# Patient Record
Sex: FEMALE | Race: WHITE | NOT HISPANIC OR LATINO | Employment: OTHER | URBAN - METROPOLITAN AREA
[De-identification: names, ages, dates, MRNs, and addresses within clinical notes are randomized per-mention and may not be internally consistent; named-entity substitution may affect disease eponyms.]

---

## 2017-03-02 ENCOUNTER — OFFICE VISIT (OUTPATIENT)
Dept: INTERNAL MEDICINE | Facility: CLINIC | Age: 62
End: 2017-03-02
Payer: MEDICARE

## 2017-03-02 VITALS
HEART RATE: 77 BPM | OXYGEN SATURATION: 98 % | BODY MASS INDEX: 20.44 KG/M2 | RESPIRATION RATE: 18 BRPM | WEIGHT: 108.25 LBS | DIASTOLIC BLOOD PRESSURE: 68 MMHG | HEIGHT: 61 IN | SYSTOLIC BLOOD PRESSURE: 122 MMHG

## 2017-03-02 DIAGNOSIS — R63.4 WEIGHT LOSS, NON-INTENTIONAL: ICD-10-CM

## 2017-03-02 DIAGNOSIS — E78.5 HYPERLIPIDEMIA WITH TARGET LDL LESS THAN 160: ICD-10-CM

## 2017-03-02 DIAGNOSIS — N64.4 MASTALGIA IN FEMALE: Primary | ICD-10-CM

## 2017-03-02 DIAGNOSIS — M89.69 POLIOMYELITIS OSTEOPATHY OF MULTIPLE SITES: ICD-10-CM

## 2017-03-02 DIAGNOSIS — B91 POLIOMYELITIS OSTEOPATHY OF MULTIPLE SITES: ICD-10-CM

## 2017-03-02 PROCEDURE — 99214 OFFICE O/P EST MOD 30 MIN: CPT | Mod: S$GLB,,, | Performed by: INTERNAL MEDICINE

## 2017-03-02 PROCEDURE — 99499 UNLISTED E&M SERVICE: CPT | Mod: S$GLB,,, | Performed by: INTERNAL MEDICINE

## 2017-03-02 PROCEDURE — 1160F RVW MEDS BY RX/DR IN RCRD: CPT | Mod: S$GLB,,, | Performed by: INTERNAL MEDICINE

## 2017-03-02 NOTE — MR AVS SNAPSHOT
[facility]  [address],  [address]  [place] [address]  Phone: [phone]  Fax: [phone]                  Julia Fernández   3/2/2017 8:20 AM   Office Visit    Description:  Female : 1955   Provider:  Jamila Costello MD   Department:  [facility]           Reason for Visit     Breast Pain           Diagnoses this Visit        Comments    Mastalgia in female    -  Primary     Hyperlipidemia with target LDL less than 160         Weight loss, non-intentional         Poliomyelitis osteopathy of multiple sites                To Do List           Future Appointments        Provider Department Dept Phone    2017 8:40 AM Jamila Costello MD [facility] [phone]      Goals (5 Years of Data)     None      [facility]     [facility] -  Assistance  Registered nurses in the [facility] provide clinical advisement, health education, appointment booking, and other advisory services.  Call for this free service at [phone].             Medications           Message regarding Medications     Verify the changes and/or additions to your medication regime listed below are the same as discussed with your clinician today.  If any of these changes or additions are incorrect, please notify your healthcare provider.             Verify that the below list of medications is an accurate representation of the medications you are currently taking.  If none reported, the list may be blank. If incorrect, please contact your healthcare provider. Carry this list with you in case of emergency.           Current Medications     alprazolam (XANAX) 0.5 MG tablet Take 0.5 mg by mouth nightly as needed for Anxiety.    aspirin (ECOTRIN) 81 MG EC tablet Take 81 mg by mouth once daily.    calcium carbonate-vitamin D3 (CALTRATE 600 + D) 600 mg (1,500 mg)-800 unit Chew Take by mouth 2 (two) times daily.     docusate sodium (COLACE) 50 MG capsule Take by mouth 2 (two) times daily.    gemfibrozil (LOPID) 600 MG tablet Take 1 tablet (600 mg total) by mouth 2 (two) times daily before meals.    hydrocodone-acetaminophen 10-325mg (NORCO)  mg Tab Take by mouth 2 (two) times daily as needed.    melatonin 3 mg Tab Take 6 mg by mouth.    niacin 500 MG CpSR Take 250 mg by mouth 2 (two) times daily.    pantoprazole (PROTONIX) 40 MG tablet Take 1 tablet (40 mg total) by mouth once daily.    zoledronic acid-mannitol & water (RECLAST) 5 mg/100 mL Soln Inject 5 mg into the vein once.           Clinical Reference Information           Your Vitals Were     BP                   122/68 (BP Location: Right arm, Patient Position: Sitting, BP Method: Manual)           Blood Pressure          Most Recent Value    BP  122/68      Allergies as of 3/2/2017     Gabapentin    Prevnar 13 [Pneumoc 13-bethany Conj-dip Cr(pf)]      Immunizations Administered on Date of Encounter - 3/2/2017     None      Orders Placed During Today's Visit     Future Labs/Procedures Expected by Expires    CBC auto differential  3/2/2017 3/2/2018    Comprehensive metabolic panel  3/2/2017 3/2/2018    Lipid panel  3/2/2017 3/2/2018    TSH  3/2/2017 3/2/2018    Urinalysis  3/2/2017 3/2/2018      [facility] Sign-Up     Activating your [facility] account is as easy as 1-2-3!     1) Visit [email or web address], select Sign Up Now, enter this activation code and your date of birth, then select Next.  Activation code not generated  Current Patient Portal Status: Account disabled      2) Create a username and password to use when you visit [facility] in the future and select a security question in case you lose your password and select Next.    3) Enter your e-mail address and click Sign Up!    Additional Information  If you have questions, please e-mail [email or web address] or call [phone] to talk to our [facility] staff. Remember, [facility] is NOT to be used for urgent needs. For  medical emergencies, dial 911.         Language Assistance Services     ATTENTION: Language assistance services are available, free of charge. Please call [phone].      ATENCIÓN: Si habnyasia oliver, tiene a roy disposición servicios gratuitos de asistencia lingüística. Llame al [phone].     CHÚ Ý: N?u b?n nói Ti?ng Vi?t, có các d?ch v? h? tr? ngôn ng? mi?n phí dành cho b?n. G?i s? [phone].         [facility] complies with applicable Federal civil rights laws and does not discriminate on the basis of race, color, national origin, age, disability, or sex.

## 2017-03-02 NOTE — PROGRESS NOTES
"Subjective:      Patient ID: Julia Fernández is a 61 y.o. female.    Chief Complaint: Breast Pain (both breast)    HPI: 61y/oWF, has had intermittent pains in both breasts, that seem to be tender to the touch.  This is not constant, not daily, nor lasts long.  Nothing mitigates this discomfort.  She has not found any masses,or had any discharge from nipples.    Review of Systems   HENT: Negative.    Respiratory: Negative.    Cardiovascular: Negative.    Gastrointestinal: Negative.    Endocrine: Negative.    Genitourinary: Negative.    Musculoskeletal: Positive for back pain, gait problem and neck pain.        Post-polio yndrome.  Sees Dr. Torre for neck pain and gets" shots in it".   Neurological: Positive for weakness.   Psychiatric/Behavioral: Negative.        Objective:   /68 (BP Location: Right arm, Patient Position: Sitting, BP Method: Manual)  Pulse 77  Resp 18  Ht 5' 1" (1.549 m)  Wt 49.1 kg (108 lb 3.9 oz)  SpO2 98%  BMI 20.45 kg/m2    Physical Exam   Constitutional: She is oriented to person, place, and time. She appears well-developed.   HENT:   Head: Atraumatic.   Mouth/Throat: Oropharynx is clear and moist.   Eyes: Conjunctivae are normal. Pupils are equal, round, and reactive to light.   Neck: Normal range of motion.   Cardiovascular: Normal rate, regular rhythm and normal heart sounds.    Pulmonary/Chest: Effort normal and breath sounds normal. She exhibits no mass, no tenderness, no crepitus, no edema, no deformity and no retraction. Right breast exhibits no inverted nipple, no mass, no nipple discharge, no skin change and no tenderness. Left breast exhibits no inverted nipple, no mass, no nipple discharge, no skin change and no tenderness. Breasts are symmetrical. There is no breast swelling.   Abdominal: Soft. Bowel sounds are normal.   Genitourinary: No breast tenderness, discharge or bleeding.   Musculoskeletal: She exhibits no tenderness.   Neurological: She is alert and " oriented to person, place, and time.   Skin: Skin is warm and dry.   Psychiatric: She has a normal mood and affect. Her behavior is normal.   Nursing note and vitals reviewed.      Assessment:     1. Mastalgia in female    2. Hyperlipidemia with target LDL less than 160    3. Weight loss, non-intentional    4. Poliomyelitis osteopathy of multiple sites    Has a negative mammogram,CXR.  Plan:     Mastalgia in female  Wonder if this could be referred from neck.  Hyperlipidemia with target LDL less than 160  -     Lipid panel; Future; Expected date: 3/2/17    Weight loss, non-intentional  -     CBC auto differential; Future; Expected date: 3/2/17  -     Comprehensive metabolic panel; Future; Expected date: 3/2/17  -     TSH; Future; Expected date: 3/2/17  -     Urinalysis; Future; Expected date: 3/2/17    Poliomyelitis osteopathy of multiple sites

## 2017-04-10 ENCOUNTER — TELEPHONE (OUTPATIENT)
Dept: INFUSION THERAPY | Facility: HOSPITAL | Age: 62
End: 2017-04-10

## 2017-04-10 RX ORDER — ZOLEDRONIC ACID 5 MG/100ML
5 INJECTION, SOLUTION INTRAVENOUS
Status: CANCELLED | OUTPATIENT
Start: 2017-04-10

## 2017-04-10 RX ORDER — SODIUM CHLORIDE 0.9 % (FLUSH) 0.9 %
10 SYRINGE (ML) INJECTION
Status: CANCELLED | OUTPATIENT
Start: 2017-04-10

## 2017-04-20 ENCOUNTER — INFUSION (OUTPATIENT)
Dept: INFUSION THERAPY | Facility: HOSPITAL | Age: 62
End: 2017-04-20
Attending: INTERNAL MEDICINE
Payer: MEDICARE

## 2017-04-20 VITALS
SYSTOLIC BLOOD PRESSURE: 109 MMHG | HEART RATE: 80 BPM | DIASTOLIC BLOOD PRESSURE: 68 MMHG | TEMPERATURE: 99 F | RESPIRATION RATE: 18 BRPM

## 2017-04-20 DIAGNOSIS — M81.0 SENILE OSTEOPOROSIS: Primary | ICD-10-CM

## 2017-04-20 PROCEDURE — 25000003 PHARM REV CODE 250: Performed by: INTERNAL MEDICINE

## 2017-04-20 PROCEDURE — 63600175 PHARM REV CODE 636 W HCPCS: Performed by: INTERNAL MEDICINE

## 2017-04-20 PROCEDURE — 96365 THER/PROPH/DIAG IV INF INIT: CPT

## 2017-04-20 RX ORDER — ZOLEDRONIC ACID 5 MG/100ML
5 INJECTION, SOLUTION INTRAVENOUS
Status: COMPLETED | OUTPATIENT
Start: 2017-04-20 | End: 2017-04-20

## 2017-04-20 RX ORDER — ZOLEDRONIC ACID 5 MG/100ML
5 INJECTION, SOLUTION INTRAVENOUS
Status: CANCELLED | OUTPATIENT
Start: 2017-04-20

## 2017-04-20 RX ORDER — SODIUM CHLORIDE 0.9 % (FLUSH) 0.9 %
10 SYRINGE (ML) INJECTION
Status: DISCONTINUED | OUTPATIENT
Start: 2017-04-20 | End: 2017-04-20 | Stop reason: HOSPADM

## 2017-04-20 RX ORDER — SODIUM CHLORIDE 0.9 % (FLUSH) 0.9 %
10 SYRINGE (ML) INJECTION
Status: CANCELLED | OUTPATIENT
Start: 2017-04-20

## 2017-04-20 RX ADMIN — ZOLEDRONIC ACID 5 MG: 5 INJECTION, SOLUTION INTRAVENOUS at 12:04

## 2017-04-20 RX ADMIN — SODIUM CHLORIDE: 0.9 INJECTION, SOLUTION INTRAVENOUS at 12:04

## 2017-04-20 NOTE — NURSING
Pt tolerated infusion well. No adverse reaction noted. Pt education reinforced on Reclast , side effects, what to expect, and when to call _[facility]_. Pt verbalized understanding. I reviewed pt calendar w/ pt and understanding verbalized. IV flushed w/ NS and D/C per protocol. Pt has received Reclast before with no adverse reactions. Continue taking Ca and Vit D as prescribed and increase PO water over next 24-48 hours. Verbalized understanding.

## 2017-04-21 ENCOUNTER — OFFICE VISIT (OUTPATIENT)
Dept: INTERNAL MEDICINE | Facility: CLINIC | Age: 62
End: 2017-04-21
Payer: MEDICARE

## 2017-04-21 VITALS
TEMPERATURE: 98 F | SYSTOLIC BLOOD PRESSURE: 112 MMHG | BODY MASS INDEX: 20.44 KG/M2 | HEART RATE: 70 BPM | OXYGEN SATURATION: 98 % | WEIGHT: 108.25 LBS | DIASTOLIC BLOOD PRESSURE: 60 MMHG | HEIGHT: 61 IN | RESPIRATION RATE: 16 BRPM

## 2017-04-21 DIAGNOSIS — M81.0 SENILE OSTEOPOROSIS: Primary | ICD-10-CM

## 2017-04-21 DIAGNOSIS — M89.69 POLIOMYELITIS OSTEOPATHY OF MULTIPLE SITES: ICD-10-CM

## 2017-04-21 DIAGNOSIS — K21.9 GASTROESOPHAGEAL REFLUX DISEASE WITHOUT ESOPHAGITIS: ICD-10-CM

## 2017-04-21 DIAGNOSIS — B91 POLIOMYELITIS OSTEOPATHY OF MULTIPLE SITES: ICD-10-CM

## 2017-04-21 DIAGNOSIS — E78.5 HYPERLIPIDEMIA WITH TARGET LDL LESS THAN 160: ICD-10-CM

## 2017-04-21 PROCEDURE — 99214 OFFICE O/P EST MOD 30 MIN: CPT | Mod: S$GLB,,, | Performed by: INTERNAL MEDICINE

## 2017-04-21 PROCEDURE — 1160F RVW MEDS BY RX/DR IN RCRD: CPT | Mod: S$GLB,,, | Performed by: INTERNAL MEDICINE

## 2017-04-21 NOTE — MR AVS SNAPSHOT
[facility]  [address],  [address]  [place] [address]  Phone: [phone]  Fax: [phone]                  Julia Fernández   2017 9:20 AM   Office Visit    Description:  Female : 1955   Provider:  Jamila Costello MD   Department:  [facility]           Reason for Visit     Results           Diagnoses this Visit        Comments    Senile osteoporosis    -  Primary     Poliomyelitis osteopathy of multiple sites         Gastroesophageal reflux disease without esophagitis         Hyperlipidemia with target LDL less than 160                To Do List           Future Appointments        Provider Department Dept Phone    2017 8:20 AM Jamila Costello MD [facility] [phone]      Goals (5 Years of Data)     None      [facility]     [facility] Care Line -  Assistance  Unless otherwise directed by your provider, please contact [facility], our nurse care line that is available for  assistance.     Registered nurses in the [facility] Center provide: appointment scheduling, clinical advisement, health education, and other advisory services.  Call: [phone] (toll free)               Medications           Message regarding Medications     Verify the changes and/or additions to your medication regime listed below are the same as discussed with your clinician today.  If any of these changes or additions are incorrect, please notify your healthcare provider.             Verify that the below list of medications is an accurate representation of the medications you are currently taking.  If none reported, the list may be blank. If incorrect, please contact your healthcare provider. Carry this list with you in case of emergency.           Current Medications     alprazolam (XANAX) 0.5 MG tablet Take 0.5 mg by mouth nightly as needed for Anxiety.    aspirin (ECOTRIN) 81 MG EC tablet  "Take 81 mg by mouth once daily.    calcium carbonate-vitamin D3 (CALTRATE 600 + D) 600 mg (1,500 mg)-800 unit Chew Take by mouth 2 (two) times daily.    docusate sodium (COLACE) 50 MG capsule Take by mouth 2 (two) times daily.    gemfibrozil (LOPID) 600 MG tablet Take 1 tablet (600 mg total) by mouth 2 (two) times daily before meals.    hydrocodone-acetaminophen 10-325mg (NORCO)  mg Tab Take by mouth 2 (two) times daily as needed.    melatonin 3 mg Tab Take 6 mg by mouth.    niacin 500 MG CpSR Take 250 mg by mouth 2 (two) times daily.    pantoprazole (PROTONIX) 40 MG tablet Take 1 tablet (40 mg total) by mouth once daily.    zoledronic acid-mannitol & water (RECLAST) 5 mg/100 mL Soln Inject 5 mg into the vein once.           Clinical Reference Information           Your Vitals Were     BP Pulse Temp Resp Height Weight    112/60 (BP Location: Right arm, Patient Position: Sitting, BP Method: Manual) 70 97.9 °F (36.6 °C) (Oral) 16 5' 1" (1.549 m) 49.1 kg (108 lb 3.9 oz)    SpO2 BMI             98% 20.45 kg/m2         Blood Pressure          Most Recent Value    BP  112/60      Allergies as of 4/21/2017     Gabapentin    Prevnar 13 [Pneumoc 13-bethany Conj-dip Cr(pf)]      Immunizations Administered on Date of Encounter - 4/21/2017     None      [facility] Sign-Up     Activating your [facility] account is as easy as 1-2-3!     1) Visit [email or web address], select Sign Up Now, enter this activation code and your date of birth, then select Next.  Activation code not generated  Current Patient Portal Status: Account disabled      2) Create a username and password to use when you visit [facility] in the future and select a security question in case you lose your password and select Next.    3) Enter your e-mail address and click Sign Up!    Additional Information  If you have questions, please e-mail [email or web address] or call [phone] to talk to our [facility] staff. Remember, [facility] is NOT to be used for urgent needs. " For medical emergencies, dial 911.         Language Assistance Services     ATTENTION: Language assistance services are available, free of charge. Please call [phone].      ATENCIÓN: Si habnyasia oliver, tiene a roy disposición servicios gratuitos de asistencia lingüística. Llame al [phone].     CHÚ Ý: N?u b?n nói Ti?ng Vi?t, có các d?ch v? h? tr? ngôn ng? mi?n phí dành cho b?n. G?i s? [phone].         [facility] complies with applicable Federal civil rights laws and does not discriminate on the basis of race, color, national origin, age, disability, or sex.

## 2017-04-21 NOTE — PROGRESS NOTES
Subjective:      Patient ID: Julia Fernández is a 62 y.o. female.    Chief Complaint: Results (pt in for lab results)    HPI: 62y/oWF, recently had her reclast infusion, done at [facility].  She also recently saw Dr. Torre for pain, who also thinks the pain in her left anterior chest  Is coming from her neck. He gave her Lyrica, but this made her woozy.  The pain has abated somewhat.  Reviewed diet, movement, family history all pertaining to Cholesterol.  Pt does not want zostavax, despite having a history of shingles on her nose.        Reviewed labs with pt.    Date/Time Component Value Flag Lab Status   04/11/17 0829 TSH 1.360 - Final result   04/11/17 0829 HDL 59 - Final result   04/11/17 0829 CHOL 195 - Final result   04/11/17 0829 TRIG 66 - Final result   04/11/17 0829 LDLCALC 122.8 - Final result   04/11/17 0829 CHOLHDL 30.3 - Final result   04/11/17 0829 NONHDLCHOL 136 - Final result   04/11/17 0829 TOTALCHOLEST 3.3 - Final result   04/11/17 0829 COLORU Yellow - Final result   04/11/17 0829 APPEARANCEUA Clear - Final result   04/11/17 0829 SPECGRAV 1.020 - Final result   04/11/17 0829 PHUR 6.0 - Final result   04/11/17 0829 KETONESU Negative - Final result   04/11/17 0829 OCCULTUA Negative - Final result   04/11/17 0829 NITRITE Negative - Final result   04/11/17 0829 UROBILINOGEN Negative - Final result   05/27/16 1555 INR 1.1 - Final result   04/11/17 0829 LEUKOCYTESUR Negative - Final result   04/11/17 0829 WBC 3.85 Low Final result   04/11/17 0829 RBC 4.41 - Final result   04/11/17 0829 HGB 12.8 - Final result   04/11/17 0829 HCT 39.6 - Final result   04/11/17 0829 MCH 29.0 - Final result   04/11/17 0829 RDW 12.1 - Final result   04/11/17 0829  - Final result   04/11/17 0829 MPV 11.4 - Final result   04/11/17 0829 GLU 88 - Final result   04/11/17 0829 BUN 15 - Final result   04/11/17 0829 CREATININE 0.50 - Final result   04/11/17 0829 CALCIUM 9.9 - Final result   04/11/17 0829  - Final  "result   04/11/17 0829 K 4.7 - Final result   04/11/17 0829  - Final result   04/11/17 0829 PROT 7.6 - Final result   04/11/17 0829 ALBUMIN 4.5 - Final result   04/11/17 0829 BILITOT 0.6 - Final result   04/11/17 0829 AST 27 - Final result   04/11/17 0829 ALKPHOS 64 - Final result   04/11/17 0829 CO2 31 High Final result   04/11/17 0829 ALT 25 - Final result   04/11/17 0829 ANIONGAP 8 - Final result   04/11/17 0829 EGFRNONAA >60.0 - Final result   04/11/17 0829 ESTGFRAFRICA >60.0 - Final result   04/11/17 0829 MCV 90 - Final result       Review of Systems   Constitutional: Negative.    HENT: Negative.    Eyes: Negative.    Respiratory: Negative.    Cardiovascular: Negative.    Gastrointestinal: Negative.    Endocrine: Negative.    Genitourinary: Negative.    Musculoskeletal: Negative.    Skin: Negative.    Allergic/Immunologic: Negative.    Neurological: Negative.    Hematological: Negative.    Psychiatric/Behavioral: Negative.        Objective:   /60 (BP Location: Right arm, Patient Position: Sitting, BP Method: Manual)  Pulse 70  Temp 97.9 °F (36.6 °C) (Oral)   Resp 16  Ht 5' 1" (1.549 m)  Wt 49.1 kg (108 lb 3.9 oz)  SpO2 98%  BMI 20.45 kg/m2    Physical Exam   Constitutional: She is oriented to person, place, and time. She appears well-developed and well-nourished.   HENT:   Head: Normocephalic and atraumatic.   Right Ear: External ear normal.   Left Ear: External ear normal.   Nose: Nose normal.   Mouth/Throat: Oropharynx is clear and moist.   Eyes: Conjunctivae and EOM are normal. Pupils are equal, round, and reactive to light.   Neck: Normal range of motion. Neck supple.   Cardiovascular: Normal rate, regular rhythm and normal heart sounds.    Pulmonary/Chest: Effort normal and breath sounds normal.   Abdominal: Soft. Bowel sounds are normal.   Musculoskeletal: She exhibits no edema or tenderness.   Brace RLE  Walks with a cane   Neurological: She is alert and oriented to person, place, and " time.   Skin: Skin is warm and dry.   Psychiatric: She has a normal mood and affect. Her behavior is normal.   Nursing note and vitals reviewed.      Assessment:     1. Senile osteoporosis    2. Poliomyelitis osteopathy of multiple sites    3. Gastroesophageal reflux disease without esophagitis    4. Hyperlipidemia with target LDL less than 160      Plan:     Senile osteoporosis    Poliomyelitis osteopathy of multiple sites  -     CBC auto differential; Future; Expected date: 8/19/17  -     Comprehensive metabolic panel; Future; Expected date: 8/19/17    Gastroesophageal reflux disease without esophagitis    Hyperlipidemia with target LDL less than 160  -     Lipid panel; Future; Expected date: 8/19/17

## 2017-06-16 ENCOUNTER — TELEPHONE (OUTPATIENT)
Dept: INTERNAL MEDICINE | Facility: CLINIC | Age: 62
End: 2017-06-16

## 2017-06-16 NOTE — TELEPHONE ENCOUNTER
"----- Message from Augustus York sent at 6/16/2017 12:59 PM CDT -----  Contact: self  [phone]  "My md ask if I could get off the lopid for 2wk, because of muscle pain in my leg.    When was the last time I had blood work done?"    Pt last labs April 11 2017.vf/ma  "

## 2017-08-18 ENCOUNTER — OFFICE VISIT (OUTPATIENT)
Dept: INTERNAL MEDICINE | Facility: CLINIC | Age: 62
End: 2017-08-18
Payer: MEDICARE

## 2017-08-18 VITALS
BODY MASS INDEX: 21 KG/M2 | TEMPERATURE: 99 F | RESPIRATION RATE: 16 BRPM | HEART RATE: 68 BPM | HEIGHT: 61 IN | OXYGEN SATURATION: 97 % | WEIGHT: 111.25 LBS | SYSTOLIC BLOOD PRESSURE: 120 MMHG | DIASTOLIC BLOOD PRESSURE: 70 MMHG

## 2017-08-18 DIAGNOSIS — B91 POLIOMYELITIS OSTEOPATHY OF MULTIPLE SITES: Primary | ICD-10-CM

## 2017-08-18 DIAGNOSIS — M89.69 POLIOMYELITIS OSTEOPATHY OF MULTIPLE SITES: Primary | ICD-10-CM

## 2017-08-18 DIAGNOSIS — I34.1 MITRAL VALVE PROLAPSE: ICD-10-CM

## 2017-08-18 DIAGNOSIS — E78.5 HYPERLIPIDEMIA WITH TARGET LDL LESS THAN 160: ICD-10-CM

## 2017-08-18 PROCEDURE — 3008F BODY MASS INDEX DOCD: CPT | Mod: S$GLB,,, | Performed by: INTERNAL MEDICINE

## 2017-08-18 PROCEDURE — 99214 OFFICE O/P EST MOD 30 MIN: CPT | Mod: S$GLB,,, | Performed by: INTERNAL MEDICINE

## 2017-08-18 PROCEDURE — 99499 UNLISTED E&M SERVICE: CPT | Mod: S$GLB,,, | Performed by: INTERNAL MEDICINE

## 2017-08-18 RX ORDER — BACLOFEN 10 MG/1
10 TABLET ORAL 2 TIMES DAILY
Qty: 60 TABLET | Refills: 11 | Status: SHIPPED | OUTPATIENT
Start: 2017-08-18 | End: 2017-12-15 | Stop reason: DRUGHIGH

## 2017-08-18 NOTE — PROGRESS NOTES
"Subjective:      Patient ID: Julia Fernández is a 62 y.o. female.    Chief Complaint: Pain (gernerlized pain for afew months)    HPI: 62 y.o. White female  Sees Dr. Baird, for chronic pain management.  She has intermitent spasms of pectoral muscles,low back, thighs.  She has tried both Neurontin and Lyrica, but both make her dizzy and confused.  She has had a CT neck ( negative), mammograms negative.      Review of Systems   HENT: Negative.    Eyes: Negative.    Respiratory: Negative.    Cardiovascular: Negative.    Gastrointestinal: Negative.    Genitourinary: Negative.    Musculoskeletal:        See chief complaint   Skin: Negative.    Neurological: Weakness: right leg.   Psychiatric/Behavioral: Negative.        Objective:   /70 (BP Location: Left arm, Patient Position: Sitting, BP Method: Large (Manual))   Pulse 68   Temp 98.5 °F (36.9 °C) (Oral)   Resp 16   Ht 5' 1" (1.549 m)   Wt 50.4 kg (111 lb 3.6 oz)   SpO2 97%   BMI 21.02 kg/m²     Physical Exam   Constitutional: She is oriented to person, place, and time. She appears well-developed and well-nourished.   HENT:   Head: Normocephalic and atraumatic.   Right Ear: External ear normal.   Left Ear: External ear normal.   Nose: Nose normal.   Mouth/Throat: Oropharynx is clear and moist.   Eyes: Conjunctivae and EOM are normal. Pupils are equal, round, and reactive to light.   Neck: Normal range of motion.   Cardiovascular: Normal rate, regular rhythm and normal heart sounds.    Pulmonary/Chest: Effort normal and breath sounds normal.   Abdominal: Soft. Bowel sounds are normal.   Musculoskeletal: She exhibits deformity.   Right leg foreshortened, atrophied from polio, wears a brace, uses a cane.  Waddle gait.  Have exam,ined breasts,axilla,chest,back,thighs... No spasms.   Neurological: She is alert and oriented to person, place, and time.   Skin: Skin is warm and dry.   Psychiatric: She has a normal mood and affect. Her behavior is normal. "   Nursing note and vitals reviewed.      Assessment:     1. Poliomyelitis osteopathy of multiple sites    2. Hyperlipidemia with target LDL less than 160    3. Mitral valve prolapse    This is probably a post-polio syndrome.  Plan:     Poliomyelitis osteopathy of multiple sites  -     CBC auto differential; Future; Expected date: 08/18/2017  -     Comprehensive metabolic panel; Future; Expected date: 08/18/2017  -     Urinalysis; Future; Expected date: 08/18/2017  -     baclofen (LIORESAL) 10 MG tablet; Take 1 tablet (10 mg total) by mouth 2 (two) times daily.  Dispense: 60 tablet; Refill: 11    Hyperlipidemia with target LDL less than 160  -     Lipid panel; Future; Expected date: 08/18/2017    Mitral valve prolapse  -     CBC auto differential; Future; Expected date: 08/18/2017  -     Comprehensive metabolic panel; Future; Expected date: 08/18/2017

## 2017-12-15 ENCOUNTER — OFFICE VISIT (OUTPATIENT)
Dept: INTERNAL MEDICINE | Facility: CLINIC | Age: 62
End: 2017-12-15
Payer: MEDICARE

## 2017-12-15 VITALS
SYSTOLIC BLOOD PRESSURE: 120 MMHG | TEMPERATURE: 98 F | OXYGEN SATURATION: 97 % | WEIGHT: 114.5 LBS | RESPIRATION RATE: 16 BRPM | HEART RATE: 100 BPM | BODY MASS INDEX: 21.62 KG/M2 | HEIGHT: 61 IN | DIASTOLIC BLOOD PRESSURE: 60 MMHG

## 2017-12-15 DIAGNOSIS — G14 POST-POLIO SYNDROME: Primary | ICD-10-CM

## 2017-12-15 DIAGNOSIS — K21.9 GASTROESOPHAGEAL REFLUX DISEASE WITHOUT ESOPHAGITIS: ICD-10-CM

## 2017-12-15 DIAGNOSIS — M89.69 POLIOMYELITIS OSTEOPATHY OF MULTIPLE SITES: ICD-10-CM

## 2017-12-15 DIAGNOSIS — B91 POLIOMYELITIS OSTEOPATHY OF MULTIPLE SITES: ICD-10-CM

## 2017-12-15 DIAGNOSIS — E78.5 HYPERLIPIDEMIA WITH TARGET LDL LESS THAN 160: ICD-10-CM

## 2017-12-15 PROCEDURE — 99214 OFFICE O/P EST MOD 30 MIN: CPT | Mod: S$GLB,,, | Performed by: INTERNAL MEDICINE

## 2017-12-15 PROCEDURE — 99499 UNLISTED E&M SERVICE: CPT | Mod: S$GLB,,, | Performed by: INTERNAL MEDICINE

## 2017-12-15 PROCEDURE — 99999 PR PBB SHADOW E&M-EST. PATIENT-LVL III: CPT | Mod: PBBFAC,,, | Performed by: INTERNAL MEDICINE

## 2017-12-15 RX ORDER — ROSUVASTATIN CALCIUM 10 MG/1
10 TABLET, COATED ORAL DAILY
Qty: 90 TABLET | Refills: 3 | Status: SHIPPED | OUTPATIENT
Start: 2017-12-15 | End: 2018-10-02 | Stop reason: SDUPTHER

## 2017-12-15 RX ORDER — PANTOPRAZOLE SODIUM 40 MG/1
40 TABLET, DELAYED RELEASE ORAL DAILY
Qty: 90 TABLET | Refills: 3 | Status: SHIPPED | OUTPATIENT
Start: 2017-12-15 | End: 2018-02-08

## 2017-12-15 RX ORDER — BACLOFEN 20 MG/1
20 TABLET ORAL 3 TIMES DAILY
Qty: 90 TABLET | Refills: 11 | Status: SHIPPED | OUTPATIENT
Start: 2017-12-15 | End: 2018-10-24 | Stop reason: SDUPTHER

## 2017-12-15 NOTE — PROGRESS NOTES
Subjective:      Patient ID: Julia Fernández is a 62 y.o. female.    Chief Complaint: Follow-up; Results; Medication Refill; and Generalized Body Aches (pain)    HPI: 62 y.o. White female , with right sided residual effects of polio, has had bilateral sternocleidomast.  Muscle pain/aciness/spasmotic issues for months. The Baclofen at 10mg BID did not seem to help, but was not sedating either.    She has had residual muscle atrophy  From Polio on the right.  Reviewed labs with pt:  0820 HDL 46 - Final result   12/01/17 0820 CHOL 219 High Final result   12/01/17 0820 TRIG 84 - Final result   12/01/17 0820 LDLCALC 156.2 - Final result   12/01/17 0820 CHOLHDL 21.0 - Final result   12/01/17 0820 NONHDLCHOL 173 - Final result   12/01/17 0820 TOTALCHOLEST 4.8 - Final result   12/01/17 0820 COLORU Yellow - Final result   12/01/17 0820 APPEARANCEUA Clear - Final result   12/01/17 0820 SPECGRAV 1.025 - Final result   12/01/17 0820 PHUR 5.0 - Final result   12/01/17 0820 KETONESU Negative - Final result   12/01/17 0820 OCCULTUA Negative - Final result   12/01/17 0820 NITRITE Negative - Final result   12/01/17 0820 UROBILINOGEN Negative - Final      12/01/17 0820 LEUKOCYTESUR Negative - Final result   12/01/17 0820 WBC 3.53 Low Final result   12/01/17 0820 RBC 4.16 - Final result   12/01/17 0820 HGB 12.3 - Final result   12/01/17 0820 HCT 37.2 - Final result   12/01/17 0820 MCH 29.6 - Final result   12/01/17 0820 RDW 11.7 - Final result   12/01/17 0820  - Final result   12/01/17 0820 MPV 10.5 - Final result   12/01/17 0820 GLU 95 - Final result   12/01/17 0820 BUN 16 - Final result   12/01/17 0820 CREATININE 0.51 - Final result   12/01/17 0820 CALCIUM 9.8 - Final result   12/01/17 0820  - Final result   12/01/17 0820 K 4.2 - Final result   12/01/17 0820  - Final result   12/01/17 0820 PROT 7.6 - Final result   12/01/17 0820 ALBUMIN 4.4 - Final result   12/01/17 0820 BILITOT 0.5 - Final result   12/01/17  "0820 AST 24 - Final result   12/01/17 0820 ALKPHOS 46 - Final result   12/01/17 0820 CO2 26 - Final result   12/01/17 0820 ALT 25 - Final result   12/01/17 0820 ANIONGAP 9 - Final result   12/01/17 0820 EGFRNONAA >60.0 - Final result   12/01/17 0820 ESTGFRAFRICA >60.0 - Final result   12/01/17 0820             Review of Systems   Constitutional: Positive for activity change. Negative for unexpected weight change.   HENT: Negative.    Respiratory: Negative for cough, chest tightness and shortness of breath.    Cardiovascular: Negative for chest pain and palpitations.   Gastrointestinal: Negative.    Endocrine: Negative.    Genitourinary: Negative.    Musculoskeletal: Positive for gait problem, myalgias, neck pain and neck stiffness.   Skin: Negative.    Allergic/Immunologic: Negative.    Psychiatric/Behavioral: Negative.        Objective:   /60 (BP Location: Left arm, Patient Position: Sitting, BP Method: Medium (Manual))   Pulse 100   Temp 98 °F (36.7 °C) (Oral)   Resp 16   Ht 5' 1" (1.549 m)   Wt 51.9 kg (114 lb 8.5 oz)   SpO2 97%   BMI 21.64 kg/m²     Physical Exam   Constitutional: She is oriented to person, place, and time. She appears well-developed and well-nourished.   HENT:   Head: Normocephalic and atraumatic.   Right Ear: External ear normal.   Left Ear: External ear normal.   Nose: Nose normal.   Mouth/Throat: Oropharynx is clear and moist.   Eyes: Conjunctivae and EOM are normal. Pupils are equal, round, and reactive to light.   Neck: Neck supple. No hepatojugular reflux present. Muscular tenderness present. Carotid bruit is not present. Decreased range of motion present.       Tender,spasms   Cardiovascular: Normal rate, regular rhythm and normal heart sounds.    Pulmonary/Chest: Effort normal and breath sounds normal.   Abdominal: Soft. Bowel sounds are normal.   Neurological: She is alert and oriented to person, place, and time.   Skin: Skin is warm and dry.   Psychiatric: She has a " normal mood and affect. Her behavior is normal.   Nursing note and vitals reviewed.      Assessment:     1. Post-polio syndrome    2. Hyperlipidemia with target LDL less than 160    3. Poliomyelitis osteopathy of multiple sites    4. Gastroesophageal reflux disease without esophagitis      Plan:     Post-polio syndrome  -     baclofen (LIORESAL) 20 MG tablet; Take 1 tablet (20 mg total) by mouth 3 (three) times daily.  Dispense: 90 tablet; Refill: 11    Hyperlipidemia with target LDL less than 160  -     rosuvastatin (CRESTOR) 10 MG tablet; Take 1 tablet (10 mg total) by mouth once daily.  Dispense: 90 tablet; Refill: 3  -     Lipid panel; Future; Expected date: 03/15/2018    Poliomyelitis osteopathy of multiple sites    Gastroesophageal reflux disease without esophagitis  -     pantoprazole (PROTONIX) 40 MG tablet; Take 1 tablet (40 mg total) by mouth once daily.  Dispense: 90 tablet; Refill: 3

## 2017-12-27 ENCOUNTER — TELEPHONE (OUTPATIENT)
Dept: FAMILY MEDICINE | Facility: CLINIC | Age: 62
End: 2017-12-27

## 2017-12-27 DIAGNOSIS — E78.5 HYPERLIPIDEMIA, UNSPECIFIED HYPERLIPIDEMIA TYPE: ICD-10-CM

## 2017-12-27 RX ORDER — GEMFIBROZIL 600 MG/1
600 TABLET, FILM COATED ORAL
Qty: 180 TABLET | Refills: 3 | Status: SHIPPED | OUTPATIENT
Start: 2017-12-27 | End: 2019-02-05 | Stop reason: SDUPTHER

## 2017-12-27 NOTE — TELEPHONE ENCOUNTER
----- Message from Saima Polo sent at 12/27/2017 10:10 AM CST -----  Contact: Self [phone]  Patient is calling to talk to nurse concerning her medication. Please advice

## 2018-01-31 ENCOUNTER — TELEPHONE (OUTPATIENT)
Dept: INTERNAL MEDICINE | Facility: CLINIC | Age: 63
End: 2018-01-31

## 2018-01-31 NOTE — TELEPHONE ENCOUNTER
----- Message from Saima Polo sent at 1/31/2018  1:23 PM CST -----  Contact: Mary Calling from Dr. Cabrera's office [phone]  Calling to see if she can get patients recent Lab orders faxed to the  The fax [phone]

## 2018-02-08 ENCOUNTER — OFFICE VISIT (OUTPATIENT)
Dept: INTERNAL MEDICINE | Facility: CLINIC | Age: 63
End: 2018-02-08
Payer: MEDICARE

## 2018-02-08 VITALS
DIASTOLIC BLOOD PRESSURE: 82 MMHG | HEART RATE: 93 BPM | SYSTOLIC BLOOD PRESSURE: 136 MMHG | BODY MASS INDEX: 21.1 KG/M2 | WEIGHT: 111.75 LBS | RESPIRATION RATE: 16 BRPM | TEMPERATURE: 98 F | HEIGHT: 61 IN | OXYGEN SATURATION: 98 %

## 2018-02-08 DIAGNOSIS — K59.00 CONSTIPATION, UNSPECIFIED CONSTIPATION TYPE: Primary | ICD-10-CM

## 2018-02-08 DIAGNOSIS — M62.81 POST-POLIO LIMB MUSCLE WEAKNESS: ICD-10-CM

## 2018-02-08 DIAGNOSIS — B91 POST-POLIO LIMB MUSCLE WEAKNESS: ICD-10-CM

## 2018-02-08 PROCEDURE — 3008F BODY MASS INDEX DOCD: CPT | Mod: S$GLB,,, | Performed by: INTERNAL MEDICINE

## 2018-02-08 PROCEDURE — 99999 PR PBB SHADOW E&M-EST. PATIENT-LVL III: CPT | Mod: PBBFAC,,, | Performed by: INTERNAL MEDICINE

## 2018-02-08 PROCEDURE — 99213 OFFICE O/P EST LOW 20 MIN: CPT | Mod: S$GLB,,, | Performed by: INTERNAL MEDICINE

## 2018-02-08 NOTE — PROGRESS NOTES
"Subjective:      Patient ID: Julia Fernández is a 62 y.o. female.    Chief Complaint: Otalgia and Neck Pain    HPI: 62 y.o. White female , sees:  Dr. Torre for chronic post polio pain  Dr. Mcgraw for ENT issues.  Recently has had:  -RLQ pain, off and on, no dysuria,diarrhea. Has chronic constipation,  Requiring daily stool softeners, and a laxative. Does not drink much water.  -a feeling of " lymph nodes in left neck, and fullness/pain in left ear.  No fever,chills,trouble swallowing. Has dentures above/below.  No headache,tinnitus,dry mouth.    I have reviewed all her labs, imaging. Nothing to suggest an etiology for these symptoms.  Review of Systems   Constitutional: Negative.    HENT: Negative for congestion, dental problem, drooling, ear discharge, ear pain, facial swelling, hearing loss, mouth sores, nosebleeds, postnasal drip, rhinorrhea, sinus pain, sinus pressure, sneezing, sore throat, tinnitus and trouble swallowing.    Respiratory: Negative.  Negative for chest tightness and shortness of breath.    Cardiovascular: Negative.  Negative for chest pain.   Gastrointestinal: Positive for abdominal pain and constipation.   Musculoskeletal: Positive for arthralgias, back pain, gait problem and myalgias.   Skin: Negative.    Neurological: Negative for dizziness, seizures, facial asymmetry, speech difficulty, weakness, light-headedness, numbness and headaches.   Psychiatric/Behavioral: Negative.        Objective:   /82 (BP Location: Right arm, Patient Position: Sitting, BP Method: Medium (Manual))   Pulse 93   Temp 98.3 °F (36.8 °C) (Oral)   Resp 16   Ht 5' 1" (1.549 m)   Wt 50.7 kg (111 lb 12.4 oz)   SpO2 98%   BMI 21.12 kg/m²     Physical Exam   Constitutional: She is oriented to person, place, and time. She appears well-developed and well-nourished.   HENT:   Head: Normocephalic and atraumatic.   Right Ear: Tympanic membrane, external ear and ear canal normal.   Left Ear: Tympanic membrane, " external ear and ear canal normal.   Nose: Nose normal. No mucosal edema or sinus tenderness.   Mouth/Throat: Oropharynx is clear and moist and mucous membranes are normal. She has dentures. No oral lesions. No trismus in the jaw.   Eyes: EOM are normal. Pupils are equal, round, and reactive to light.   Neck: Neck supple. No JVD present. No tracheal deviation present. No thyromegaly present.   Cardiovascular: Normal rate, regular rhythm and normal heart sounds.    Pulmonary/Chest: Effort normal and breath sounds normal. No stridor.   Abdominal: Soft. Bowel sounds are normal. She exhibits no distension and no mass. There is no tenderness. There is no rebound and no guarding.   Musculoskeletal:   Right leg brace.   Lymphadenopathy:     She has no cervical adenopathy.   Neurological: She is alert and oriented to person, place, and time.   Skin: Skin is warm and dry.   Nursing note and vitals reviewed.      Assessment:     1. Constipation, unspecified constipation type    2. Post-polio limb muscle weakness    Unclear etiology of neck symptoms.  The abdominal pain is probably from constipation.  Plan:     Constipation, unspecified constipation type    Post-polio limb muscle weakness    Refer to Dr. Mcgraw, for more in depth testing.  Fluids, fiber, fruits,vegs.

## 2018-02-08 NOTE — PATIENT INSTRUCTIONS
Handwashing: Tips for Patients, Family, and Friends    Germs are everywhere around us. Normally, we live with germs without getting sick. In certain cases, harmful germs cause us to get sick with an infection. Or we can spread harmful germs to others and cause them to get sick. Keeping your hands clean is the best way to prevent getting or spreading germs that cause infection. Wash your hands with soap and water or use an alcohol-based hand .  When to clean your hands: For patients  In the hospital or in your home, you can come in contact with many harmful germs. To help prevent infection, wash your hands often, especially:  · After using the bathroom  · Before and after eating  · After coughing or sneezing  · After using a tissue  · After touching or changing a dressing or bandage  · After touching any object or surface that may be contaminated  · After touching an animal during a pet therapy session (hospital)  · After touching an animal, cleaning up after a pet, or preparing food for pets (home)  If you dont have access to soap and water, use an alcohol-based hand gel containing at least 60% alcohol. These products kill most germs and are easy to use. But use soap and water (not alcohol-based hand gel) if your hands are visibly dirty.  When to clean your hands: For family and friends  When visiting or caring for a loved one, washing your hands or using an alcohol-based hand  can help stop germs from spreading. Wash your hands:  · Before entering and after leaving the patients room  · As soon as you remove gloves or other protective clothing  · After changing a dressing or bandage  · After any contact with blood or other body fluids  · After touching or changing the patients bed linen or towels  · After touching an animal during a pet therapy session (hospital)  · After touching an animal, cleaning up after a pet, or preparing food for pets (home)  Many hospitals have sinks or gel dispensers  right outside patient rooms. If not, carry a bottle of alcohol-based hand gel with you, and use it every time you visit. Use soap and water (not alcohol-based hand gel) if your hands are visibly dirty.  Tips for good handwashing  Here are some suggestions to follow:  · Use warm water and plenty of soap. Work up a good lather.  · Clean the whole hand, including under your nails, between your fingers, and up the wrists.  · Wash for at least 15 to 20 seconds. Dont just wipe. Scrub well.  · Rinse, letting the water run down your fingers, not up your wrists.  · Dry your hands well. Use a paper towel to turn off the faucet and open the door.  Time matters  The longer you wash your hands, the more germs youll remove. Most people wash their hands for 6 to 7 seconds. But at least 15 seconds are needed to remove germs. Singing Happy Birthday or the Alphabet Song are examples of how long 15 seconds would be. To protect yourself and others from infection, washing for 30 seconds is best.  How to use an alcohol-based hand   Alcohol-based hand  may kill more germs than soap and water. Use them when your hands arent visibly dirty. For best results, follow these steps:  · Choose a gel or spray that contains at least 60 percent alcohol. Products with less alcohol may not kill germs.  · Spread about a tablespoon of  in the palm of one hand.  · Rub your hands together briskly, cleaning the backs of your hands, the palms, between your fingers, and up the wrists.  · Rub until the  is gone, and your hands are completely dry.  How do antibacterial soaps and alcohol-based hand  differ?  Antibacterial soaps:  · Come in liquid or bar form and are used with water  · Are no better at removing germs than plain soap  Alcohol-based hand :  · Come in gels or sprays that dont need water  · Are as or more effective than washing with soap and water   Date Last Reviewed: 12/1/2016  © 3503-9830 [employer]. [address] [place] [address], [place] [address]. All rights reserved. This information is not intended as a substitute for professional medical care. Always follow your healthcare professional's instructions.

## 2018-02-26 ENCOUNTER — PES CALL (OUTPATIENT)
Dept: ADMINISTRATIVE | Facility: CLINIC | Age: 63
End: 2018-02-26

## 2018-03-16 ENCOUNTER — OFFICE VISIT (OUTPATIENT)
Dept: INTERNAL MEDICINE | Facility: CLINIC | Age: 63
End: 2018-03-16
Payer: MEDICARE

## 2018-03-16 VITALS
DIASTOLIC BLOOD PRESSURE: 72 MMHG | SYSTOLIC BLOOD PRESSURE: 130 MMHG | WEIGHT: 114.63 LBS | HEIGHT: 61 IN | HEART RATE: 74 BPM | OXYGEN SATURATION: 96 % | BODY MASS INDEX: 21.64 KG/M2 | RESPIRATION RATE: 16 BRPM

## 2018-03-16 DIAGNOSIS — D68.62 LUPUS ANTICOAGULANT SYNDROME: Primary | ICD-10-CM

## 2018-03-16 DIAGNOSIS — M81.0 SENILE OSTEOPOROSIS: ICD-10-CM

## 2018-03-16 DIAGNOSIS — E78.5 HYPERLIPIDEMIA WITH TARGET LDL LESS THAN 160: ICD-10-CM

## 2018-03-16 PROCEDURE — 99499 UNLISTED E&M SERVICE: CPT | Mod: S$GLB,,, | Performed by: INTERNAL MEDICINE

## 2018-03-16 PROCEDURE — 99213 OFFICE O/P EST LOW 20 MIN: CPT | Mod: S$GLB,,, | Performed by: INTERNAL MEDICINE

## 2018-03-16 PROCEDURE — 99999 PR PBB SHADOW E&M-EST. PATIENT-LVL III: CPT | Mod: PBBFAC,,, | Performed by: INTERNAL MEDICINE

## 2018-03-16 RX ORDER — PANTOPRAZOLE SODIUM 40 MG/1
40 TABLET, DELAYED RELEASE ORAL DAILY
COMMUNITY
End: 2018-07-19 | Stop reason: SDUPTHER

## 2018-03-16 NOTE — PROGRESS NOTES
"Subjective:      Patient ID: Julia Fernández is a 62 y.o. female.    Chief Complaint: Follow-up (3 month follow up) and Results (Lab results)    HPI: 62 y.o. White female , here for results:  03/09/18 0811 HDL 53 - Final result   03/09/18 0811 CHOL 154 - Final result   03/09/18 0811 TRIG 87 - Final result   03/09/18 0811 LDLCALC 83.6 - Final result   03/09/18 0811 CHOLHDL 34.4 - Final result   03/09/18 0811 NONHDLCHOL 101 - Final      She had been followed by [facility] for osteoporosis and hypercoaguable syndrome.  States that since she has been on the Reclast, she has had more aches and pains.    She did return to see Dr. Mcgraw re: neck pain. He believes it may be arthritis in her neck, or the way she chews.  Not an ENT issue, per him.  Review of Systems   Constitutional: Negative.    HENT: Negative for congestion, postnasal drip and sore throat.    Respiratory: Negative for cough, choking, shortness of breath and wheezing.    Cardiovascular: Negative for chest pain and palpitations.   Musculoskeletal: Positive for back pain and gait problem.   Allergic/Immunologic: Negative.    Psychiatric/Behavioral: Negative.        Objective:   /72 (BP Location: Right arm, Patient Position: Sitting, BP Method: Medium (Manual))   Pulse 74   Resp 16   Ht 5' 1" (1.549 m)   Wt 52 kg (114 lb 9.6 oz)   SpO2 96%   BMI 21.65 kg/m²     Physical Exam   Constitutional: She appears well-developed.   Slight build   HENT:   Head: Normocephalic and atraumatic.   Right Ear: External ear normal.   Left Ear: External ear normal.   Nose: Nose normal.   Mouth/Throat: Oropharynx is clear and moist.   Eyes: Conjunctivae and EOM are normal. Pupils are equal, round, and reactive to light.   Neck: Normal range of motion. Neck supple.   Cardiovascular: Normal rate, regular rhythm and normal heart sounds.    Pulmonary/Chest: Effort normal and breath sounds normal.   Abdominal: Soft.   Musculoskeletal: She exhibits deformity. "   Neurological: She is alert.   Skin: Skin is warm and dry.   Psychiatric: She has a normal mood and affect. Her behavior is normal.   Nursing note and vitals reviewed.      Assessment:     1. Lupus anticoagulant syndrome    2. Hyperlipidemia with target LDL less than 160    3. Senile osteoporosis    Encouraged to get the testing, return to ask Rheum if the Reclast can be changed or it needs to be continued.  Plan:     Lupus anticoagulant syndrome  -     Cardiolipin antibody; Future; Expected date: 03/16/2018  -     CBC auto differential; Future; Expected date: 03/16/2018  -     Sedimentation rate, automated; Future; Expected date: 03/16/2018    Hyperlipidemia with target LDL less than 160    Senile osteoporosis  -     DXA Bone Density Spine And Hip; Future; Expected date: 03/16/2018  -     Calcitriol; Future; Expected date: 03/16/2018

## 2018-03-26 ENCOUNTER — TELEPHONE (OUTPATIENT)
Dept: INTERNAL MEDICINE | Facility: CLINIC | Age: 63
End: 2018-03-26

## 2018-03-26 NOTE — TELEPHONE ENCOUNTER
Dr salgado will be back tomorrow. The labs look OK to me, but I don't know what she was looking for., so DR salgado will need to notify her of the results after she returns.

## 2018-03-26 NOTE — TELEPHONE ENCOUNTER
Patient requesting lab and bone density results. She also is requesting copy of test. # [phone]. Vf/ma

## 2018-03-26 NOTE — TELEPHONE ENCOUNTER
----- Message from Lisa Arredondo sent at 3/26/2018  2:59 PM CDT -----  Contact: Self/ [phone]  Patient would like to get test results.     Please call and advise.

## 2018-03-29 NOTE — TELEPHONE ENCOUNTER
Spoke with patient and she states that she was told her cholesterol was down. She is going to see Dr. Stafford about her Bone Density. Please advise

## 2018-04-27 ENCOUNTER — PES CALL (OUTPATIENT)
Dept: ADMINISTRATIVE | Facility: CLINIC | Age: 63
End: 2018-04-27

## 2018-07-13 ENCOUNTER — OFFICE VISIT (OUTPATIENT)
Dept: FAMILY MEDICINE | Facility: CLINIC | Age: 63
End: 2018-07-13
Payer: MEDICARE

## 2018-07-13 VITALS
OXYGEN SATURATION: 98 % | BODY MASS INDEX: 21.39 KG/M2 | DIASTOLIC BLOOD PRESSURE: 62 MMHG | HEART RATE: 78 BPM | WEIGHT: 113.31 LBS | RESPIRATION RATE: 16 BRPM | SYSTOLIC BLOOD PRESSURE: 114 MMHG | HEIGHT: 61 IN

## 2018-07-13 DIAGNOSIS — E78.5 HYPERLIPIDEMIA WITH TARGET LDL LESS THAN 160: Primary | ICD-10-CM

## 2018-07-13 DIAGNOSIS — S16.1XXS NECK STRAIN, SEQUELA: ICD-10-CM

## 2018-07-13 PROCEDURE — 99213 OFFICE O/P EST LOW 20 MIN: CPT | Mod: S$GLB,,, | Performed by: FAMILY MEDICINE

## 2018-07-13 PROCEDURE — 3008F BODY MASS INDEX DOCD: CPT | Mod: CPTII,S$GLB,, | Performed by: FAMILY MEDICINE

## 2018-07-13 PROCEDURE — 99999 PR PBB SHADOW E&M-EST. PATIENT-LVL III: CPT | Mod: PBBFAC,,, | Performed by: FAMILY MEDICINE

## 2018-07-13 NOTE — PROGRESS NOTES
"FAMILY MEDICINE    Patient Active Problem List   Diagnosis    Hyperlipidemia with target LDL less than 160    Poliomyelitis osteopathy of multiple sites    Mitral valve prolapse    Anxiety    Senile osteoporosis    Lupus anticoagulant syndrome       CC:   Chief Complaint   Patient presents with    Follow-up     "3 month check up"       SUBJECTIVE:  Julia Fernández   is a 63 y.o. female  Neck Pain    This is a chronic problem. The current episode started more than 1 year ago (and reports that has seen Pain Managment and dentist. Thought to be 2/2 dental applicances and OA for neck "i have  apinched nerve"). The problem occurs intermittently. The problem has been unchanged. The pain is associated with an unknown factor. The pain is present in the left side (both side but left > right). The quality of the pain is described as shooting. The pain is at a severity of 5/10. The pain is moderate. The symptoms are aggravated by coughing, twisting, bending and position. The pain is same all the time. Stiffness is present in the morning. Pertinent negatives include no chest pain, fever, headaches, leg pain, numbness, pain with swallowing, paresis, photophobia, syncope, tingling, trouble swallowing, visual change, weakness or weight loss. She has tried oral narcotics for the symptoms. The treatment provided moderate relief.       ROS: Review of Systems   Constitutional: Negative for activity change, appetite change, chills, fatigue, fever and weight loss.   HENT: Negative for drooling, ear discharge, ear pain, facial swelling, hearing loss, mouth sores, nosebleeds, rhinorrhea, sinus pain, sinus pressure, sneezing, sore throat and trouble swallowing.    Eyes: Negative for photophobia and discharge.   Respiratory: Negative for apnea, cough, choking, chest tightness and shortness of breath.    Cardiovascular: Negative for chest pain, palpitations, leg swelling and syncope.   Gastrointestinal: Negative for abdominal pain, " "constipation and diarrhea.   Endocrine: Negative for cold intolerance, heat intolerance, polydipsia, polyphagia and polyuria.   Musculoskeletal: Positive for arthralgias and neck pain. Negative for back pain, myalgias and neck stiffness.   Neurological: Negative for dizziness, tingling, syncope, facial asymmetry, weakness, light-headedness, numbness and headaches.       Past Medical History:   Diagnosis Date    Anxiety     Arthritis     Clotting disorder     GERD (gastroesophageal reflux disease)     High cholesterol     High triglycerides     Lupus     "Lupus anticoagulant"    Mitral valve prolapse     Osteoporosis     Pancreatitis     Polio     Scoliosis        Past Surgical History:   Procedure Laterality Date    ADENOIDECTOMY      APPENDECTOMY       SECTION      CHOLECYSTECTOMY      COLONOSCOPY      EYE SURGERY Right     lower eyelid ("growth removed")    HYSTERECTOMY      TONSILLECTOMY      TUBAL LIGATION         ALLERGIES:   Review of patient's allergies indicates:   Allergen Reactions    Gabapentin Other (See Comments)     dizzy    Prevnar 13 [pneumoc 13-bethany conj-dip cr(pf)] Swelling and Rash       MEDS:   Current Outpatient Prescriptions:     alprazolam (XANAX) 0.5 MG tablet, Take 0.5 mg by mouth nightly as needed for Anxiety., Disp: , Rfl:     aspirin (ECOTRIN) 81 MG EC tablet, Take 81 mg by mouth once daily., Disp: , Rfl:     baclofen (LIORESAL) 20 MG tablet, Take 1 tablet (20 mg total) by mouth 3 (three) times daily., Disp: 90 tablet, Rfl: 11    calcium carbonate-vitamin D3 (CALTRATE 600 + D) 600 mg (1,500 mg)-800 unit Chew, Take by mouth 2 (two) times daily., Disp: , Rfl:     docusate sodium (COLACE) 50 MG capsule, Take by mouth 2 (two) times daily., Disp: , Rfl:     gemfibrozil (LOPID) 600 MG tablet, TAKE 1 TABLET (600 MG TOTAL) BY MOUTH 2 (TWO) TIMES DAILY BEFORE MEALS., Disp: 180 tablet, Rfl: 3    hydrocodone-acetaminophen 10-325mg (NORCO)  mg Tab, Take by " "mouth 2 (two) times daily as needed., Disp: , Rfl:     melatonin 3 mg Tab, Take 6 mg by mouth., Disp: , Rfl:     pantoprazole (PROTONIX) 40 MG tablet, Take 40 mg by mouth once daily., Disp: , Rfl:     rosuvastatin (CRESTOR) 10 MG tablet, Take 1 tablet (10 mg total) by mouth once daily., Disp: 90 tablet, Rfl: 3    zoledronic acid-mannitol & water (RECLAST) 5 mg/100 mL Soln, Inject 5 mg into the vein once., Disp: , Rfl:     OBJECTIVE:   Vitals:    07/13/18 0942   BP: 114/62   BP Location: Right arm   Patient Position: Sitting   Pulse: 78   Resp: 16   SpO2: 98%   Weight: 51.4 kg (113 lb 4.8 oz)   Height: 5' 1" (1.549 m)       Physical Exam   Constitutional: No distress.   HENT:   Head: Normocephalic and atraumatic.   Nose: Nose normal.   Mouth/Throat: Oropharynx is clear and moist.   Eyes: EOM are normal. Pupils are equal, round, and reactive to light.   Neck: Full passive range of motion without pain. Neck supple. No spinous process tenderness and no muscular tenderness present. No neck rigidity. No edema, no erythema and normal range of motion present. No thyromegaly present.   +elevated first rib left side   Cardiovascular: Normal rate, regular rhythm and normal heart sounds.    Pulmonary/Chest: Effort normal and breath sounds normal.   Musculoskeletal: She exhibits no edema.   Lymphadenopathy:     She has no cervical adenopathy.   Neurological: She is alert.   Skin: Skin is warm.         PERTINANT RESULTS:   Lab Visit on 03/16/2018   Component Date Value Ref Range Status    APA Isotype IgG 03/16/2018 <9.40  0.00 - 14.99 GPL Final    Comment: IgG Anticardiolipin Antibody:  15 - 20 GPL   Indeterminate  21 - 80 GPL   Low to Medium Positive  >80 GPL      High Positive  The following results were obtained with the [employer] QUANTA   Lite DARIN IgG III JANIS. Cardiolipin IgG values obtained with   different manufacturers' assay methods may not be used   interchangeably. The magnitude of the reported IgG levels   cannot be " correlated to an endpoint titer.      APA Isotype IgM 03/16/2018 <9.40  0.00 - 12.49 MPL Final    Comment: IgM Anticardiolipin Antibody  12.5 - 20 MPL Indeterminate  21 - 80 MPL   Low to Medium Positive  >80 MPL     High Positive  The following results were obtained with the [employer]ANT[employer]   eSighte DARIN IgM III JANIS. Cardiolipin IgM values obtained with   different manufacturers' assay methods may not be used   interchangeably. The magnitude of the reported IgM levels   cannot be correlated to an endpoint titer.      WBC 03/16/2018 4.69  3.90 - 12.70 K/uL Final    RBC 03/16/2018 4.25  4.00 - 5.40 M/uL Final    Hemoglobin 03/16/2018 12.5  12.0 - 16.0 g/dL Final    Hematocrit 03/16/2018 37.9  37.0 - 48.5 % Final    MCV 03/16/2018 89  82 - 98 fL Final    MCH 03/16/2018 29.4  27.0 - 31.0 pg Final    MCHC 03/16/2018 33.0  32.0 - 36.0 g/dL Final    RDW 03/16/2018 12.1  11.5 - 14.5 % Final    Platelets 03/16/2018 206  150 - 350 K/uL Final    MPV 03/16/2018 11.3  9.2 - 12.9 fL Final    Gran # (ANC) 03/16/2018 3.1  1.8 - 7.7 K/uL Final    Lymph # 03/16/2018 1.0  1.0 - 4.8 K/uL Final    Mono # 03/16/2018 0.5  0.3 - 1.0 K/uL Final    Eos # 03/16/2018 0.1  0.0 - 0.5 K/uL Final    Baso # 03/16/2018 0.03  0.00 - 0.20 K/uL Final    Gran% 03/16/2018 66.0  38.0 - 73.0 % Final    Lymph% 03/16/2018 21.7  18.0 - 48.0 % Final    Mono% 03/16/2018 9.6  4.0 - 15.0 % Final    Eosinophil% 03/16/2018 2.1  0.0 - 8.0 % Final    Basophil% 03/16/2018 0.6  0.0 - 1.9 % Final    Differential Method 03/16/2018 Automated   Final    Vit D, 1,25-Dihydroxy 03/16/2018 52  20 - 79 pg/mL Final    Comment: Vitamin D 1, 25 dihydroxy levels should be primarily used to  assess Vitamin D status in patients with renal disease and   hypercalcemia. Vitamin D 1,25-dihydroxy levels are generally  less than 5 pg/mL in end stage renal disease patients. The  preferred initial test for assessing Vitamin D status in the  general population is Vitamin D  25-hydroxy (VITD).  Test performed at [facility],  [address] , [place]  [address]     [phone]  Esdras Casas MD  - Medical Director       Lab Visit on 03/16/2018   Component Date Value Ref Range Status    APA Isotype IgG 03/16/2018 <9.40  0.00 - 14.99 GPL Final    Comment: IgG Anticardiolipin Antibody:  15 - 20 GPL   Indeterminate  21 - 80 GPL   Low to Medium Positive  >80 GPL      High Positive  The following results were obtained with the [employer] QUANTA   Lite DARIN IgG III JANIS. Cardiolipin IgG values obtained with   different manufacturers' assay methods may not be used   interchangeably. The magnitude of the reported IgG levels   cannot be correlated to an endpoint titer.      APA Isotype IgM 03/16/2018 <9.40  0.00 - 12.49 MPL Final    Comment: IgM Anticardiolipin Antibody  12.5 - 20 MPL Indeterminate  21 - 80 MPL   Low to Medium Positive  >80 MPL     High Positive  The following results were obtained with the [employer] QUANTA   Lite DARIN IgM III JANIS. Cardiolipin IgM values obtained with   different manufacturers' assay methods may not be used   interchangeably. The magnitude of the reported IgM levels   cannot be correlated to an endpoint titer.      WBC 03/16/2018 4.69  3.90 - 12.70 K/uL Final    RBC 03/16/2018 4.25  4.00 - 5.40 M/uL Final    Hemoglobin 03/16/2018 12.5  12.0 - 16.0 g/dL Final    Hematocrit 03/16/2018 37.9  37.0 - 48.5 % Final    MCV 03/16/2018 89  82 - 98 fL Final    MCH 03/16/2018 29.4  27.0 - 31.0 pg Final    MCHC 03/16/2018 33.0  32.0 - 36.0 g/dL Final    RDW 03/16/2018 12.1  11.5 - 14.5 % Final    Platelets 03/16/2018 206  150 - 350 K/uL Final    MPV 03/16/2018 11.3  9.2 - 12.9 fL Final    Gran # (ANC) 03/16/2018 3.1  1.8 - 7.7 K/uL Final    Lymph # 03/16/2018 1.0  1.0 - 4.8 K/uL Final    Mono # 03/16/2018 0.5  0.3 - 1.0 K/uL Final    Eos # 03/16/2018 0.1  0.0 - 0.5 K/uL Final    Baso # 03/16/2018 0.03  0.00 - 0.20 K/uL Final    Gran% 03/16/2018  66.0  38.0 - 73.0 % Final    Lymph% 03/16/2018 21.7  18.0 - 48.0 % Final    Mono% 03/16/2018 9.6  4.0 - 15.0 % Final    Eosinophil% 03/16/2018 2.1  0.0 - 8.0 % Final    Basophil% 03/16/2018 0.6  0.0 - 1.9 % Final    Differential Method 03/16/2018 Automated   Final    Vit D, 1,25-Dihydroxy 03/16/2018 52  20 - 79 pg/mL Final    Comment: Vitamin D 1, 25 dihydroxy levels should be primarily used to  assess Vitamin D status in patients with renal disease and   hypercalcemia. Vitamin D 1,25-dihydroxy levels are generally  less than 5 pg/mL in end stage renal disease patients. The  preferred initial test for assessing Vitamin D status in the  general population is Vitamin D 25-hydroxy (VITD).  Test performed at [facility],  [address] [place], [place] [id number]     [phone]  Esdras Casas MD  - Medical Director     Lab Visit on 03/09/2018   Component Date Value Ref Range Status    Cholesterol 03/09/2018 154  120 - 199 mg/dL Final    Comment: The National Cholesterol Education Program (NCEP) has set the  following guidelines (reference ranges) for Cholesterol:  Optimal.....................<200 mg/dL  Borderline High.............200-239 mg/dL  High........................> or = 240 mg/dL      Triglycerides 03/09/2018 87  30 - 150 mg/dL Final    Comment: The National Cholesterol Education Program (NCEP) has set the  following guidelines (reference values) for triglycerides:  Normal......................<150 mg/dL  Borderline High.............150-199 mg/dL  High........................200-499 mg/dL      HDL 03/09/2018 53  40 - 75 mg/dL Final    Comment: The National Cholesterol Education Program (NCEP) has set the  following guidelines (reference values) for HDL Cholesterol:  Low...............<40 mg/dL  Optimal...........>60 mg/dL      LDL Cholesterol 03/09/2018 83.6  63.0 - 159.0 mg/dL Final    Comment: The National Cholesterol Education Program (NCEP) has set the  following guidelines  (reference values) for LDL Cholesterol:  Optimal.......................<130 mg/dL  Borderline High...............130-159 mg/dL  High..........................160-189 mg/dL  Very High.....................>190 mg/dL      HDL/Chol Ratio 03/09/2018 34.4  20.0 - 50.0 % Final    Total Cholesterol/HDL Ratio 03/09/2018 2.9  2.0 - 5.0 Final    Non-HDL Cholesterol 03/09/2018 101  mg/dL Final    Comment: Risk category and Non-HDL cholesterol goals:  Coronary heart disease (CHD)or equivalent (10-year risk of CHD >20%):  Non-HDL cholesterol goal     <130 mg/dL  Two or more CHD risk factors and 10-year risk of CHD <= 20%:  Non-HDL cholesterol goal     <160 mg/dL  0 to 1 CHD risk factor:  Non-HDL cholesterol goal     <190 mg/dL       ASSESSMENT:  Problem List Items Addressed This Visit     Hyperlipidemia with target LDL less than 160 - Primary    Current Assessment & Plan     - LDL at goal  - continue same meds             Other Visit Diagnoses     Neck strain, sequela        - with elevated first rib  - likely combination of factors including DJD, abnormal gait mechanics and tension (use cane on left side)  - + elevated first rib          PLAN:      OMT: elevate first rib (FPR), occipital release; ME left scalenes and left trapezius.   Instruced pt on home exercises to help decrease episodes of pain though chronic and will not resolved completely and discuss with pt.     Follow-up with PCP in 3 months.     Dr. Almaz Tavares D.O.   Family Medicine

## 2018-07-19 RX ORDER — PANTOPRAZOLE SODIUM 40 MG/1
40 TABLET, DELAYED RELEASE ORAL DAILY
Qty: 90 TABLET | Refills: 3 | Status: SHIPPED | OUTPATIENT
Start: 2018-07-19 | End: 2019-05-13 | Stop reason: SDUPTHER

## 2018-07-19 NOTE — TELEPHONE ENCOUNTER
----- Message from Jenise Carrington sent at 7/19/2018  9:09 AM CDT -----  Contact: [phone]/self  Patient would like a refill of  pantoprazole (PROTONIX) 40 MG tablet sent to [facility]. Please advise.

## 2018-09-14 ENCOUNTER — HOSPITAL ENCOUNTER (OUTPATIENT)
Dept: RADIOLOGY | Facility: HOSPITAL | Age: 63
Discharge: HOME | End: 2018-09-14
Attending: OTOLARYNGOLOGY
Payer: MEDICARE

## 2018-09-14 DIAGNOSIS — I77.9 CAROTID ARTERY DISORDER: ICD-10-CM

## 2018-09-14 PROCEDURE — 93880 EXTRACRANIAL BILAT STUDY: CPT | Mod: TC

## 2018-09-14 PROCEDURE — 93880 EXTRACRANIAL BILAT STUDY: CPT | Mod: 26,,, | Performed by: RADIOLOGY

## 2018-09-27 ENCOUNTER — PES CALL (OUTPATIENT)
Dept: ADMINISTRATIVE | Facility: CLINIC | Age: 63
End: 2018-09-27

## 2018-10-02 DIAGNOSIS — E78.5 HYPERLIPIDEMIA WITH TARGET LDL LESS THAN 160: ICD-10-CM

## 2018-10-03 RX ORDER — ROSUVASTATIN CALCIUM 10 MG/1
TABLET, COATED ORAL
Qty: 90 TABLET | Refills: 3 | Status: SHIPPED | OUTPATIENT
Start: 2018-10-03 | End: 2019-07-29 | Stop reason: SDUPTHER

## 2018-10-24 DIAGNOSIS — G14 POST-POLIO SYNDROME: ICD-10-CM

## 2018-10-24 RX ORDER — BACLOFEN 20 MG/1
20 TABLET ORAL 3 TIMES DAILY
Qty: 90 TABLET | Refills: 11 | Status: SHIPPED | OUTPATIENT
Start: 2018-10-24 | End: 2019-08-12 | Stop reason: SDUPTHER

## 2019-02-05 ENCOUNTER — OFFICE VISIT (OUTPATIENT)
Dept: FAMILY MEDICINE | Facility: CLINIC | Age: 64
End: 2019-02-05
Payer: MEDICARE

## 2019-02-05 ENCOUNTER — TELEPHONE (OUTPATIENT)
Dept: FAMILY MEDICINE | Facility: CLINIC | Age: 64
End: 2019-02-05

## 2019-02-05 VITALS
OXYGEN SATURATION: 97 % | DIASTOLIC BLOOD PRESSURE: 80 MMHG | RESPIRATION RATE: 16 BRPM | HEIGHT: 61 IN | SYSTOLIC BLOOD PRESSURE: 118 MMHG | TEMPERATURE: 98 F | WEIGHT: 115.81 LBS | HEART RATE: 87 BPM | BODY MASS INDEX: 21.86 KG/M2

## 2019-02-05 DIAGNOSIS — E78.49 OTHER HYPERLIPIDEMIA: Primary | ICD-10-CM

## 2019-02-05 DIAGNOSIS — M62.838 MUSCLE SPASM: ICD-10-CM

## 2019-02-05 DIAGNOSIS — Z12.39 SCREENING FOR BREAST CANCER: ICD-10-CM

## 2019-02-05 DIAGNOSIS — D68.62 LUPUS ANTICOAGULANT SYNDROME: ICD-10-CM

## 2019-02-05 DIAGNOSIS — I34.1 MITRAL VALVE PROLAPSE: ICD-10-CM

## 2019-02-05 DIAGNOSIS — M81.6 LOCALIZED OSTEOPOROSIS WITHOUT CURRENT PATHOLOGICAL FRACTURE: ICD-10-CM

## 2019-02-05 PROCEDURE — 99213 OFFICE O/P EST LOW 20 MIN: CPT | Mod: HCNC,S$GLB,, | Performed by: FAMILY MEDICINE

## 2019-02-05 PROCEDURE — 99213 PR OFFICE/OUTPT VISIT, EST, LEVL III, 20-29 MIN: ICD-10-PCS | Mod: HCNC,S$GLB,, | Performed by: FAMILY MEDICINE

## 2019-02-05 PROCEDURE — 3008F BODY MASS INDEX DOCD: CPT | Mod: HCNC,CPTII,S$GLB, | Performed by: FAMILY MEDICINE

## 2019-02-05 PROCEDURE — 99999 PR PBB SHADOW E&M-EST. PATIENT-LVL V: ICD-10-PCS | Mod: PBBFAC,HCNC,, | Performed by: FAMILY MEDICINE

## 2019-02-05 PROCEDURE — 99999 PR PBB SHADOW E&M-EST. PATIENT-LVL V: CPT | Mod: PBBFAC,HCNC,, | Performed by: FAMILY MEDICINE

## 2019-02-05 PROCEDURE — 3008F PR BODY MASS INDEX (BMI) DOCUMENTED: ICD-10-PCS | Mod: HCNC,CPTII,S$GLB, | Performed by: FAMILY MEDICINE

## 2019-02-05 RX ORDER — ZOLEDRONIC ACID 5 MG/100ML
5 INJECTION, SOLUTION INTRAVENOUS
Status: CANCELLED | OUTPATIENT
Start: 2019-02-05

## 2019-02-05 RX ORDER — SODIUM CHLORIDE 0.9 % (FLUSH) 0.9 %
10 SYRINGE (ML) INJECTION
Status: CANCELLED | OUTPATIENT
Start: 2019-02-05

## 2019-02-05 RX ORDER — GEMFIBROZIL 600 MG/1
600 TABLET, FILM COATED ORAL
Qty: 180 TABLET | Refills: 3 | Status: SHIPPED | OUTPATIENT
Start: 2019-02-05 | End: 2019-11-27 | Stop reason: SDUPTHER

## 2019-02-05 NOTE — TELEPHONE ENCOUNTER
Chelo Reardon,    I just wanted to make you aware that Dr. Tavares put in a therapy plan for this patient for reclast. Please let me know if theres anything else you need from us.    Delia Jones LPN

## 2019-02-05 NOTE — ASSESSMENT & PLAN NOTE
- Reclast 5 mg started by Rheumatology  - okay to continue though little improvement noted on 3240-8421 Dexa  - poor weight bearing on right leg 2/2 h/o polio which may affect bone density  - continue to monitor

## 2019-02-05 NOTE — ASSESSMENT & PLAN NOTE
- without h/o DVT/PE  - followed by Rheumatology Dr. Stafford and pt reports that Dr. Stafford would like to order testing for lupus anticoagulant  - stable

## 2019-02-05 NOTE — PROGRESS NOTES
FAMILY MEDICINE    Patient Active Problem List   Diagnosis    Other hyperlipidemia    Poliomyelitis osteopathy of multiple sites    Mitral valve prolapse    Anxiety    Localized osteoporosis without current pathological fracture    Lupus anticoagulant syndrome    Muscle spasm       CC:   Chief Complaint   Patient presents with    Follow-up       SUBJECTIVE:  Julia Fernández   is a 63 y.o. female  - with hyperlipidemia, anxiety, osteoporosis and lupus anticoagulant with h/o polio with right leg weakness and recurrent muscle spasms on Baclofen 20 mg TID presents for routine follow-up with Dr. Costello who is out of the office and would like to transfer care.     Stable on Baclofen 20 mg that was increased by Dr. Costello. She is only taking BID and well controlled with muscle spasms in her legs and hands. Reports that she has tried there treatments including Soma in the past which causes too much fatigue. She is also on chronic opioids followed by Pain Management.     Reports that she is on Reclast 5 mg IV yearly for osteoporosis prescribed by her Rheumatologist Dr. Alana Stafford. Reports that has been stable on medication and tolerating well but the infusion center at [facility] where she used to go is now closed. Reports that her Rheumatologist recommend her primary care find her a local area to continue the infusion. Pt reports that she has not tried any other medication treatment for osteoporosis.         ROS: Review of Systems   Constitutional: Negative for activity change, appetite change, fatigue and fever.   HENT: Negative for nosebleeds.    Eyes: Negative for visual disturbance.   Respiratory: Negative for cough, chest tightness and shortness of breath.    Cardiovascular: Negative for chest pain, palpitations and leg swelling.   Gastrointestinal: Negative for abdominal pain, constipation, diarrhea, nausea and vomiting.   Musculoskeletal: Positive for arthralgias, back pain, gait problem, myalgias (muscle  "spasms) and neck stiffness. Negative for neck pain.   Skin: Negative for rash.   Neurological: Negative for dizziness, light-headedness and headaches.   Psychiatric/Behavioral: Negative for sleep disturbance.       Past Medical History:   Diagnosis Date    Anxiety     Arthritis     Clotting disorder     Lupus anticoagulant    GERD (gastroesophageal reflux disease)     High cholesterol     High triglycerides     Lupus     "Lupus anticoagulant"    Mitral valve prolapse     Osteoporosis     Pancreatitis     Polio     9 month old with right leg discrepency and weaknes    Scoliosis        Past Surgical History:   Procedure Laterality Date    ADENOIDECTOMY      APPENDECTOMY       SECTION      x1    CHOLECYSTECTOMY      COLONOSCOPY      EYE SURGERY Right     lower eyelid ("growth removed")    HYSTERECTOMY      endometriosis    TONSILLECTOMY      TUBAL LIGATION         ALLERGIES:   Review of patient's allergies indicates:   Allergen Reactions    Gabapentin Other (See Comments)     dizzy    Prevnar 13 [pneumoc 13-bethany conj-dip cr(pf)] Swelling and Rash       MEDS:   Current Outpatient Medications:     aspirin (ECOTRIN) 81 MG EC tablet, Take 81 mg by mouth once daily., Disp: , Rfl:     baclofen (LIORESAL) 20 MG tablet, Take 1 tablet (20 mg total) by mouth 3 (three) times daily., Disp: 90 tablet, Rfl: 11    calcium carbonate-vitamin D3 (CALTRATE 600 + D) 600 mg (1,500 mg)-800 unit Chew, Take by mouth 2 (two) times daily., Disp: , Rfl:     docusate sodium (COLACE) 50 MG capsule, Take by mouth 2 (two) times daily., Disp: , Rfl:     gemfibrozil (LOPID) 600 MG tablet, Take 1 tablet (600 mg total) by mouth 2 (two) times daily before meals., Disp: 180 tablet, Rfl: 3    hydrocodone-acetaminophen 10-325mg (NORCO)  mg Tab, Take by mouth 2 (two) times daily as needed., Disp: , Rfl:     melatonin 3 mg Tab, Take 6 mg by mouth., Disp: , Rfl:     pantoprazole (PROTONIX) 40 MG tablet, Take 1 " "tablet (40 mg total) by mouth once daily., Disp: 90 tablet, Rfl: 3    rosuvastatin (CRESTOR) 10 MG tablet, TAKE 1 TABLET EVERY DAY, Disp: 90 tablet, Rfl: 3    zoledronic acid-mannitol & water (RECLAST) 5 mg/100 mL Soln, Inject 5 mg into the vein once., Disp: , Rfl:     alprazolam (XANAX) 0.5 MG tablet, Take 0.5 mg by mouth nightly as needed for Anxiety., Disp: , Rfl:     OBJECTIVE:   Vitals:    02/05/19 1040   BP: 118/80   BP Location: Left arm   Patient Position: Sitting   BP Method: Medium (Manual)   Pulse: 87   Resp: 16   Temp: 98.3 °F (36.8 °C)   TempSrc: Oral   SpO2: 97%   Weight: 52.5 kg (115 lb 12.8 oz)   Height: 5' 1" (1.549 m)     Body mass index is 21.88 kg/m².    Physical Exam   Constitutional: No distress.   Neck: Neck supple.   Cardiovascular: Normal rate, regular rhythm and normal heart sounds.   Pulmonary/Chest: Effort normal and breath sounds normal.   Musculoskeletal: She exhibits no edema.   Neurological: She displays atrophy (right leg).         PERTINENT RESULTS:   Lab Results   Component Value Date    CHOL 154 03/09/2018    CHOL 219 (H) 12/01/2017    CHOL 195 04/11/2017     Lab Results   Component Value Date    HDL 53 03/09/2018    HDL 46 12/01/2017    HDL 59 04/11/2017     Lab Results   Component Value Date    LDLCALC 83.6 03/09/2018    LDLCALC 156.2 12/01/2017    LDLCALC 122.8 04/11/2017     Lab Results   Component Value Date    TRIG 87 03/09/2018    TRIG 84 12/01/2017    TRIG 66 04/11/2017     Lab Results   Component Value Date    CHOLHDL 34.4 03/09/2018    CHOLHDL 21.0 12/01/2017    CHOLHDL 30.3 04/11/2017     Lab Results   Component Value Date    ALT 25 12/01/2017    AST 24 12/01/2017    ALKPHOS 46 12/01/2017    BILITOT 0.5 12/01/2017     3/20/2018   DEXA BONE DENSITY SPINE HIP    CLINICAL HISTORY:  Screening for osteoporosis; Age-related osteoporosis without current pathological fracture    TECHNIQUE:  DXA scanning was performed over the left hip and lumbar spine.  Review of the images " confirms satisfactory positioning and technique.    COMPARISON:  DEXA scan from 12/01/2016    FINDINGS:  The L1  to L4 vertebral bone mineral density is equal to 1.249 g/cm squared with a T score of 0.6 and a Z score of 2.6.    The left femoral neck bone mineral density is equal to 0.767 g/cm squared with a T score of -1.9 and a Z score of -0.2.    The right femoral neck bone mineral density is equal to 0.499 g/cm squared with a T-score of -3.9 and a Z-score of -2.1.      Impression       Lumbar spine demonstrates normal ossification.    Left femoral neck demonstrates osteopenia.    Right femoral neck demonstrates osteoporosis.       ASSESSMENT:  Problem List Items Addressed This Visit        Cardiac/Vascular    Other hyperlipidemia - Primary    Current Assessment & Plan     - LDL goal <100  - well controlled  - continue current meds         Relevant Medications    gemfibrozil (LOPID) 600 MG tablet    Other Relevant Orders    Comprehensive metabolic panel    Lipid panel    Mitral valve prolapse    Current Assessment & Plan     - asymptomatic  - followed by Cardiology            Hematology    Lupus anticoagulant syndrome    Current Assessment & Plan     - without h/o DVT/PE  - followed by Rheumatology Dr. Stafford and pt reports that Dr. Stafford would like to order testing for lupus anticoagulant  - stable          Relevant Orders    LUPUS ANTICOAGULANT (DRVVT)    CBC auto differential       Orthopedic    Localized osteoporosis without current pathological fracture    Overview     - last Dexa 3/20/18: lumbar normal (0.6), osteopenia left femoral neck (-1.9) and osteoporosis right femoral neck (-3.9)         Current Assessment & Plan     - Reclast 5 mg started by Rheumatology  - okay to continue though little improvement noted on 0459-3509 Dexa  - poor weight bearing on right leg 2/2 h/o polio which may affect bone density  - continue to monitor         Muscle spasm    Current Assessment & Plan     - 2/2 h/o polio  -  continue Baclofen 20 mg BID           Other Visit Diagnoses     Screening for breast cancer        Relevant Orders    Mammo Digital Screening Bilateral With CAD          PLAN:   Orders Placed This Encounter    Mammo Digital Screening Bilateral With CAD    Comprehensive metabolic panel    Lipid panel    LUPUS ANTICOAGULANT (DRVVT)    CBC auto differential    gemfibrozil (LOPID) 600 MG tablet     Follow-up in 6 months.     Dr. Almaz Tavares D.O.   Family Medicine

## 2019-02-08 ENCOUNTER — TELEPHONE (OUTPATIENT)
Dept: FAMILY MEDICINE | Facility: CLINIC | Age: 64
End: 2019-02-08

## 2019-02-08 DIAGNOSIS — E83.52 HYPERCALCEMIA: ICD-10-CM

## 2019-02-08 NOTE — TELEPHONE ENCOUNTER
Called and discussed labs and plan with pt.     Problem List Items Addressed This Visit        Renal/    Hypercalcemia     - discussed with pt  - very mild  - rec repeat in 1-3 months with iPTH and vitamin D level         Relevant Orders    Calcium, ionized    Basic metabolic panel    Vitamin D    PTH, intact        Dr. Almaz Tavares D.O.   [facility]

## 2019-02-12 ENCOUNTER — TELEPHONE (OUTPATIENT)
Dept: FAMILY MEDICINE | Facility: CLINIC | Age: 64
End: 2019-02-12

## 2019-02-12 NOTE — TELEPHONE ENCOUNTER
----- Message from Almaz Tavares DO sent at 2/12/2019  9:49 AM CST -----  Please call pt and let her know her lupus anticoagulant testing is negative

## 2019-02-18 ENCOUNTER — TELEPHONE (OUTPATIENT)
Dept: FAMILY MEDICINE | Facility: CLINIC | Age: 64
End: 2019-02-18

## 2019-02-18 NOTE — TELEPHONE ENCOUNTER
----- Message from Tom Lanier sent at 2/18/2019  8:01 AM CST -----  Contact: self/[phone]  Patient called to speak with your office about her infusion.  She says the doctor told her to call if she had not heard anything about it.    Please call and advise.

## 2019-02-20 RX ORDER — ZOLEDRONIC ACID 5 MG/100ML
5 INJECTION, SOLUTION INTRAVENOUS
Status: CANCELLED | OUTPATIENT
Start: 2019-02-20

## 2019-02-20 RX ORDER — HEPARIN 100 UNIT/ML
500 SYRINGE INTRAVENOUS
Status: CANCELLED | OUTPATIENT
Start: 2019-02-20

## 2019-02-20 RX ORDER — SODIUM CHLORIDE 0.9 % (FLUSH) 0.9 %
10 SYRINGE (ML) INJECTION
Status: CANCELLED | OUTPATIENT
Start: 2019-02-20

## 2019-02-20 RX ORDER — ACETAMINOPHEN 500 MG
500 TABLET ORAL
Status: CANCELLED | OUTPATIENT
Start: 2019-02-20

## 2019-02-22 ENCOUNTER — TELEPHONE (OUTPATIENT)
Dept: FAMILY MEDICINE | Facility: CLINIC | Age: 64
End: 2019-02-22

## 2019-02-22 NOTE — TELEPHONE ENCOUNTER
----- Message from Almaz Tavares DO sent at 2/21/2019  2:50 PM CST -----  Please call pt. Mammogram is negative. Repeat in 1-2 years

## 2019-03-08 ENCOUNTER — PATIENT OUTREACH (OUTPATIENT)
Dept: ADMINISTRATIVE | Facility: HOSPITAL | Age: 64
End: 2019-03-08

## 2019-04-13 PROBLEM — R07.9 CHEST PAIN: Status: ACTIVE | Noted: 2019-04-13

## 2019-05-06 ENCOUNTER — TELEPHONE (OUTPATIENT)
Dept: FAMILY MEDICINE | Facility: CLINIC | Age: 64
End: 2019-05-06

## 2019-05-06 NOTE — TELEPHONE ENCOUNTER
Spoke with patient, informed that calcium levels with Vit D and parathyroid testing was normal. Pt verbalizes understanding.

## 2019-05-06 NOTE — TELEPHONE ENCOUNTER
----- Message from Almaz Tavares DO sent at 5/6/2019 12:55 PM CDT -----  Please call pt:   1. Repeat calcium levels with vitamin D and parathyroid testing is normal

## 2019-05-13 DIAGNOSIS — J44.9 CHRONIC OBSTRUCTIVE PULMONARY DISEASE, UNSPECIFIED COPD TYPE: Primary | ICD-10-CM

## 2019-05-13 DIAGNOSIS — K21.9 GASTROESOPHAGEAL REFLUX DISEASE WITHOUT ESOPHAGITIS: Primary | ICD-10-CM

## 2019-05-14 RX ORDER — PANTOPRAZOLE SODIUM 40 MG/1
TABLET, DELAYED RELEASE ORAL
Qty: 90 TABLET | Refills: 1 | Status: SHIPPED | OUTPATIENT
Start: 2019-05-14 | End: 2019-10-02 | Stop reason: SDUPTHER

## 2019-05-27 ENCOUNTER — HOSPITAL ENCOUNTER (OUTPATIENT)
Dept: PULMONOLOGY | Facility: HOSPITAL | Age: 64
Discharge: HOME | End: 2019-05-27
Attending: INTERNAL MEDICINE
Payer: MEDICARE

## 2019-05-27 VITALS — OXYGEN SATURATION: 97 %

## 2019-05-27 DIAGNOSIS — J44.9 CHRONIC OBSTRUCTIVE PULMONARY DISEASE, UNSPECIFIED COPD TYPE: ICD-10-CM

## 2019-05-27 PROCEDURE — 99900031 HC PATIENT EDUCATION (STAT): Mod: HCNC

## 2019-05-27 PROCEDURE — 94727 GAS DIL/WSHOT DETER LNG VOL: CPT | Mod: HCNC

## 2019-05-27 PROCEDURE — 94760 N-INVAS EAR/PLS OXIMETRY 1: CPT | Mod: HCNC

## 2019-05-27 PROCEDURE — 94060 EVALUATION OF WHEEZING: CPT | Mod: HCNC

## 2019-05-27 PROCEDURE — 94729 DIFFUSING CAPACITY: CPT | Mod: HCNC

## 2019-06-03 ENCOUNTER — PES CALL (OUTPATIENT)
Dept: ADMINISTRATIVE | Facility: CLINIC | Age: 64
End: 2019-06-03

## 2019-07-29 DIAGNOSIS — E78.5 HYPERLIPIDEMIA WITH TARGET LDL LESS THAN 160: ICD-10-CM

## 2019-07-29 RX ORDER — ROSUVASTATIN CALCIUM 10 MG/1
TABLET, COATED ORAL
Qty: 90 TABLET | Refills: 3 | Status: SHIPPED | OUTPATIENT
Start: 2019-07-29 | End: 2020-03-05 | Stop reason: SDUPTHER

## 2019-08-12 DIAGNOSIS — G14 POST-POLIO SYNDROME: ICD-10-CM

## 2019-08-13 RX ORDER — BACLOFEN 20 MG/1
TABLET ORAL
Qty: 270 TABLET | Refills: 0 | Status: SHIPPED | OUTPATIENT
Start: 2019-08-13 | End: 2019-10-16 | Stop reason: SDUPTHER

## 2019-08-19 ENCOUNTER — HOSPITAL ENCOUNTER (OUTPATIENT)
Dept: RADIOLOGY | Facility: HOSPITAL | Age: 64
Discharge: HOME | End: 2019-08-19
Attending: INTERNAL MEDICINE
Payer: MEDICARE

## 2019-08-19 DIAGNOSIS — Z72.0 TOBACCO USE: ICD-10-CM

## 2019-08-19 DIAGNOSIS — R91.8 ABNORMAL FINDINGS ON DIAGNOSTIC IMAGING OF LUNG: ICD-10-CM

## 2019-08-19 PROCEDURE — 71250 CT CHEST WITHOUT CONTRAST: ICD-10-PCS | Mod: 26,HCNC,, | Performed by: RADIOLOGY

## 2019-08-19 PROCEDURE — 71250 CT THORAX DX C-: CPT | Mod: 26,HCNC,, | Performed by: RADIOLOGY

## 2019-08-19 PROCEDURE — 71250 CT THORAX DX C-: CPT | Mod: TC,HCNC

## 2019-09-05 PROBLEM — Z90.710 HISTORY OF HYSTERECTOMY: Status: ACTIVE | Noted: 2019-09-05

## 2019-09-05 PROBLEM — E83.52 HYPERCALCEMIA: Status: RESOLVED | Noted: 2019-02-08 | Resolved: 2019-09-05

## 2019-09-05 PROBLEM — R07.9 CHEST PAIN: Status: RESOLVED | Noted: 2019-04-13 | Resolved: 2019-09-05

## 2019-09-05 NOTE — PROGRESS NOTES
FAMILY MEDICINE    Patient Active Problem List   Diagnosis    Other hyperlipidemia    Poliomyelitis osteopathy of multiple sites    Gastroesophageal reflux disease without esophagitis    Mitral valve prolapse    Localized osteoporosis without current pathological fracture    Lupus anticoagulant syndrome    Muscle spasm    History of hysterectomy    Elevated LFTs    Pulmonary nodule       CC:   Chief Complaint   Patient presents with    Follow-up       SUBJECTIVE:  Julia Fernández   is a 64 y.o. female  - with hyperlipidemia, anxiety, osteoporosis (Rheumatologist Dr. Alana Stafford), lupus anticoagulant with h/o polio with right leg weakness and recurrent muscle spasms presents for routine follow-up.     1. Post-polio syndrome with muscle spasm    Symptoms: spasms of legs and hands    Prior treatments: Soma (cause fatigue and daytime sleepiness)   Current medications:   baclofen (LIORESAL) 20 MG tablet, TAKE 1 TABLET THREE TIMES DAILY, Disp: 270 tablet, Rfl: 0  hydrocodone-acetaminophen 10-325mg (NORCO)  mg Tab, Take by mouth 2 (two) times daily as needed., Disp: , Rfl: (followed by pain management)    Mobility: doing well with out walker or cane. +right ankle brace to prevent foot drop    2. Hyperlipidemia    Current treatment:  gemfibrozil (LOPID) 600 MG tablet, Take 1 tablet (600 mg total) by mouth 2 (two) times daily before meals., Disp: 180 tablet, Rfl: 3  rosuvastatin (CRESTOR) 10 MG tablet, TAKE 1 TABLET EVERY DAY, Disp: 90 tablet, Rfl: 3    Side effects from current treatment: elevated LFTs    Lab Results       Component                Value               Date                       CHOL                     130                 04/13/2019                 CHOL                     166                 02/06/2019                 CHOL                     154                 03/09/2018            Lab Results       Component                Value               Date                       HDL                 "      47                  04/13/2019                 HDL                      65                  02/06/2019                 HDL                      53                  03/09/2018            Lab Results       Component                Value               Date                       LDLCALC                  69.6                04/13/2019                 LDLCALC                  89.6                02/06/2019                 LDLCALC                  83.6                03/09/2018            Lab Results       Component                Value               Date                       TRIG                     67                  04/13/2019                 TRIG                     57                  02/06/2019                 TRIG                     87                  03/09/2018            Lab Results       Component                Value               Date                       CHOLHDL                  36.2                04/13/2019                 CHOLHDL                  39.2                02/06/2019                 CHOLHDL                  34.4                03/09/2018                      ROS: Review of Systems   Constitutional: Negative.    HENT: Negative.    Eyes: Negative.    Respiratory: Negative.    Cardiovascular: Negative.    Gastrointestinal: Negative.    Endocrine: Negative.    Genitourinary: Negative.    Musculoskeletal: Negative.    Skin: Negative.    Allergic/Immunologic: Negative.    Neurological: Positive for weakness (chronic right LE). Negative for dizziness, tremors, seizures, syncope, facial asymmetry, speech difficulty, light-headedness, numbness and headaches.   Hematological: Negative.    Psychiatric/Behavioral: Negative.        Past Medical History:   Diagnosis Date    Anxiety     Arthritis     Clotting disorder     Lupus anticoagulant    GERD (gastroesophageal reflux disease)     High cholesterol     High triglycerides     Lupus     "Lupus anticoagulant"    Mitral valve prolapse     " "Osteoporosis     Pancreatitis     Polio     9 month old with right leg discrepency and weaknes    Scoliosis        Past Surgical History:   Procedure Laterality Date    ADENOIDECTOMY      APPENDECTOMY       SECTION      x1    CHOLECYSTECTOMY      COLONOSCOPY      EYE SURGERY Right     lower eyelid ("growth removed")    HYSTERECTOMY      endometriosis    TONSILLECTOMY      TUBAL LIGATION         Family History   Problem Relation Age of Onset    Arthritis Mother     Diabetes Mother     Hearing loss Mother     Vision loss Mother     COPD Mother     Depression Mother     Vision loss Father     Cancer Father         Pituitary tumor    Heart disease Father         CABG    Diabetes Sister     Hyperlipidemia Sister     Hypertension Sister     Hearing loss Sister     Drug abuse Brother     Alcohol abuse Brother     Early death Brother     Hypertension Brother     No Known Problems Son        Social History     Tobacco Use    Smoking status: Former Smoker     Packs/day: 1.00     Years: 29.00     Pack years: 29.00     Start date:      Last attempt to quit:      Years since quittin.6    Smokeless tobacco: Never Used   Substance Use Topics    Alcohol use: No    Drug use: No       Social History     Social History Narrative    . 1 son. 3 grandchildren (2 girls and 1 boy). Disability       ALLERGIES:   Review of patient's allergies indicates:   Allergen Reactions    Gabapentin Other (See Comments)     dizzy    Prevnar 13 [pneumoc 13-bethany conj-dip cr(pf)] Swelling and Rash       MEDS:   Current Outpatient Medications:     aspirin (ECOTRIN) 81 MG EC tablet, Take 81 mg by mouth once daily., Disp: , Rfl:     baclofen (LIORESAL) 20 MG tablet, TAKE 1 TABLET THREE TIMES DAILY, Disp: 270 tablet, Rfl: 0    calcium carbonate-vitamin D3 (CALTRATE 600 + D) 600 mg (1,500 mg)-800 unit Chew, Take by mouth 2 (two) times daily., Disp: , Rfl:     docusate sodium (COLACE) 50 MG " "capsule, Take by mouth 2 (two) times daily., Disp: , Rfl:     gemfibrozil (LOPID) 600 MG tablet, Take 1 tablet (600 mg total) by mouth 2 (two) times daily before meals., Disp: 180 tablet, Rfl: 3    hydrocodone-acetaminophen 10-325mg (NORCO)  mg Tab, Take by mouth 2 (two) times daily as needed., Disp: , Rfl:     melatonin 3 mg Tab, Take 6 mg by mouth., Disp: , Rfl:     pantoprazole (PROTONIX) 40 MG tablet, TAKE 1 TABLET ONE TIME DAILY, Disp: 90 tablet, Rfl: 1    rosuvastatin (CRESTOR) 10 MG tablet, TAKE 1 TABLET EVERY DAY, Disp: 90 tablet, Rfl: 3    zoledronic acid-mannitol & water (RECLAST) 5 mg/100 mL Soln, Inject 5 mg into the vein once., Disp: , Rfl:     bisacodyl (DULCOLAX) 5 mg EC tablet, Take 5 mg by mouth daily as needed for Constipation., Disp: , Rfl:     OBJECTIVE:   Vitals:    09/06/19 0741   BP: 128/74   BP Location: Left arm   Patient Position: Sitting   BP Method: Large (Manual)   Pulse: 74   Resp: 18   SpO2: 97%   Weight: 52.6 kg (116 lb)   Height: 5' 1" (1.549 m)     Body mass index is 21.92 kg/m².    Physical Exam   Constitutional: No distress.   Neck: Neck supple.   Cardiovascular: Normal rate, regular rhythm, normal heart sounds and intact distal pulses. Exam reveals no gallop and no friction rub.   No murmur heard.  Pulmonary/Chest: Effort normal and breath sounds normal. She has no decreased breath sounds. She has no wheezes. She has no rhonchi. She has no rales.   Musculoskeletal: She exhibits no edema.   Neurological: She is alert.         PERTINENT RESULTS:    8/19/2019 STAT      Narrative     EXAMINATION:  CT CHEST WITHOUT CONTRAST    CLINICAL HISTORY:  Tobacco use;Abn findings on diagnostic imaging of lung;lung lesion; Other nonspecific abnormal finding of lung field    TECHNIQUE:  Low dose axial images, sagittal and coronal reformations were obtained from the thoracic inlet to the lung bases. Contrast was not administered.    COMPARISON:  04/13/2019, " 06/23/2017    FINDINGS:  Calcification in the lower aspect of the left thyroid lobe.  The main central airways are patent.  The esophagus appears normal.  The heart is normal in size.  Calcified coronary artery disease.  No mediastinal, hilar or axillary adenopathy is appreciated.    There is a similar appearing irregular semi-solid density in the right lung apex anteriorly that measures approximately 1 cm, similar to before.  No other nodule is detected.  No consolidation or pleural effusions.    Limited evaluation of the upper abdominal structures show no gross abnormality.    Age-appropriate degenerative changes affect the skeleton.      Impression       Semi-solid 1 cm density in the right lung apex, similar to the previous examination of 04/13/2019, with somewhat irregular contours.  For a part solid nodule 6 mm or greater, Fleischner Society 2017 guidelines recommend follow up with non-contrast chest CT at 3-6 months to confirm persistence. If this nodule is unchanged and the solid component remains <6 mm, annual follow up CT should be performed for 5 years; however, persistence of a part-solid nodule with solid component ?6 mm should be considered highly suspicious and may warrant further workup with PET/CT, biopsy, or resection.     3/20/2018          DEXA BONE DENSITY SPINE HIP    CLINICAL HISTORY:  Screening for osteoporosis; Age-related osteoporosis without current pathological fracture    TECHNIQUE:  DXA scanning was performed over the left hip and lumbar spine.  Review of the images confirms satisfactory positioning and technique.    COMPARISON:  DEXA scan from 12/01/2016    FINDINGS:  The L1  to L4 vertebral bone mineral density is equal to 1.249 g/cm squared with a T score of 0.6 and a Z score of 2.6.    The left femoral neck bone mineral density is equal to 0.767 g/cm squared with a T score of -1.9 and a Z score of -0.2.    The right femoral neck bone mineral density is equal to 0.499 g/cm squared  with a T-score of -3.9 and a Z-score of -2.1.           Impression          Lumbar spine demonstrates normal ossification.    Left femoral neck demonstrates osteopenia.    Right femoral neck demonstrates osteoporosis.         ASSESSMENT/PLAN:  Problem List Items Addressed This Visit        Pulmonary    Pulmonary nodule    Overview     - 8/19/19 follow-up CT chest 1cm nodule RUL similar to 4/13/19  - followed by Dr. [facility]aud[facility] with plan to repeat in 11/2019            Cardiac/Vascular    Other hyperlipidemia    Current Assessment & Plan     Lab Results   Component Value Date    LDLCALC 69.6 04/13/2019     - well controlled  - continue current medications  - repeat lipids next visit         Relevant Orders    Comprehensive metabolic panel    Lipid panel       Hematology    Lupus anticoagulant syndrome    Current Assessment & Plan     - without h/o DVT/PE  - followed by Rheumatology Dr. Stafford and pt reports that Dr. Stafford would like to order testing for lupus anticoagulant  - repeat labs due next visit         Relevant Orders    CBC auto differential    LUPUS ANTICOAGULANT (DRVVT)       GI    Gastroesophageal reflux disease without esophagitis    Current Assessment & Plan     - well controlled  - continue current medications  - unable to wean off of PPI and aware of concerns for long term use of PPIs         Elevated LFTs - Primary    Current Assessment & Plan     - asymptomatic   - 2016 Hepatitis C screening negative  - repeat LFTs         Relevant Orders    Hepatic function panel (Completed)       Orthopedic    Muscle spasm    Current Assessment & Plan     - 2/2 h/o polio  - continue Baclofen 20 mg BID               ORDERS:   Orders Placed This Encounter    Hepatic function panel    Comprehensive metabolic panel    Lipid panel    CBC auto differential    LUPUS ANTICOAGULANT (DRVVT)     Orders Placed This Encounter   Procedures    Hepatic function panel     Standing Status:   Future     Number of  Occurrences:   1     Standing Expiration Date:   11/4/2020    Comprehensive metabolic panel     Standing Status:   Future     Standing Expiration Date:   11/6/2020    Lipid panel     Standing Status:   Future     Standing Expiration Date:   11/6/2020    CBC auto differential     Standing Status:   Future     Standing Expiration Date:   11/4/2020    LUPUS ANTICOAGULANT (DRVVT)     Standing Status:   Future     Standing Expiration Date:   11/4/2020       Vaccines recommended: Shingrix and Tdap. Pt declined. Recommend Flu and pt will get from pharmacy.     Follow-up in 6 months with labs prior. Repeat LFTs today     Dr. Almaz Tavares D.O.   Family Medicine

## 2019-09-06 ENCOUNTER — OFFICE VISIT (OUTPATIENT)
Dept: FAMILY MEDICINE | Facility: CLINIC | Age: 64
End: 2019-09-06
Payer: MEDICARE

## 2019-09-06 VITALS
DIASTOLIC BLOOD PRESSURE: 74 MMHG | OXYGEN SATURATION: 97 % | HEART RATE: 74 BPM | WEIGHT: 116 LBS | BODY MASS INDEX: 21.9 KG/M2 | RESPIRATION RATE: 18 BRPM | HEIGHT: 61 IN | SYSTOLIC BLOOD PRESSURE: 128 MMHG

## 2019-09-06 DIAGNOSIS — K21.9 GASTROESOPHAGEAL REFLUX DISEASE WITHOUT ESOPHAGITIS: ICD-10-CM

## 2019-09-06 DIAGNOSIS — D68.62 LUPUS ANTICOAGULANT SYNDROME: ICD-10-CM

## 2019-09-06 DIAGNOSIS — E78.49 OTHER HYPERLIPIDEMIA: ICD-10-CM

## 2019-09-06 DIAGNOSIS — R79.89 ELEVATED LFTS: Primary | ICD-10-CM

## 2019-09-06 DIAGNOSIS — R91.1 PULMONARY NODULE: ICD-10-CM

## 2019-09-06 DIAGNOSIS — M62.838 MUSCLE SPASM: ICD-10-CM

## 2019-09-06 PROCEDURE — 99214 OFFICE O/P EST MOD 30 MIN: CPT | Mod: HCNC,S$GLB,, | Performed by: FAMILY MEDICINE

## 2019-09-06 PROCEDURE — 99499 UNLISTED E&M SERVICE: CPT | Mod: HCNC,S$GLB,, | Performed by: FAMILY MEDICINE

## 2019-09-06 PROCEDURE — 3008F BODY MASS INDEX DOCD: CPT | Mod: HCNC,CPTII,S$GLB, | Performed by: FAMILY MEDICINE

## 2019-09-06 PROCEDURE — 99214 PR OFFICE/OUTPT VISIT, EST, LEVL IV, 30-39 MIN: ICD-10-PCS | Mod: HCNC,S$GLB,, | Performed by: FAMILY MEDICINE

## 2019-09-06 PROCEDURE — 99999 PR PBB SHADOW E&M-EST. PATIENT-LVL IV: ICD-10-PCS | Mod: PBBFAC,HCNC,, | Performed by: FAMILY MEDICINE

## 2019-09-06 PROCEDURE — 3008F PR BODY MASS INDEX (BMI) DOCUMENTED: ICD-10-PCS | Mod: HCNC,CPTII,S$GLB, | Performed by: FAMILY MEDICINE

## 2019-09-06 PROCEDURE — 99999 PR PBB SHADOW E&M-EST. PATIENT-LVL IV: CPT | Mod: PBBFAC,HCNC,, | Performed by: FAMILY MEDICINE

## 2019-09-06 PROCEDURE — 99499 RISK ADDL DX/OHS AUDIT: ICD-10-PCS | Mod: HCNC,S$GLB,, | Performed by: FAMILY MEDICINE

## 2019-09-06 RX ORDER — ROSUVASTATIN CALCIUM 10 MG/1
10 TABLET, COATED ORAL DAILY
Qty: 90 TABLET | Refills: 1 | Status: CANCELLED | OUTPATIENT
Start: 2019-09-06

## 2019-09-06 RX ORDER — PANTOPRAZOLE SODIUM 40 MG/1
TABLET, DELAYED RELEASE ORAL
Qty: 90 TABLET | Refills: 1 | Status: CANCELLED | OUTPATIENT
Start: 2019-09-06

## 2019-09-06 NOTE — ASSESSMENT & PLAN NOTE
- without h/o DVT/PE  - followed by Rheumatology Dr. Stafford and pt reports that Dr. Stafford would like to order testing for lupus anticoagulant  - repeat labs due next visit

## 2019-09-06 NOTE — ASSESSMENT & PLAN NOTE
Lab Results   Component Value Date    LDLCALC 69.6 04/13/2019     - well controlled  - continue current medications  - repeat lipids next visit

## 2019-09-06 NOTE — ASSESSMENT & PLAN NOTE
- well controlled  - continue current medications  - unable to wean off of PPI and aware of concerns for long term use of PPIs

## 2019-10-02 DIAGNOSIS — K21.9 GASTROESOPHAGEAL REFLUX DISEASE WITHOUT ESOPHAGITIS: ICD-10-CM

## 2019-10-02 RX ORDER — PANTOPRAZOLE SODIUM 40 MG/1
TABLET, DELAYED RELEASE ORAL
Qty: 90 TABLET | Refills: 1 | Status: SHIPPED | OUTPATIENT
Start: 2019-10-02 | End: 2020-02-13

## 2019-10-16 DIAGNOSIS — G14 POST-POLIO SYNDROME: ICD-10-CM

## 2019-10-17 RX ORDER — BACLOFEN 20 MG/1
TABLET ORAL
Qty: 270 TABLET | Refills: 0 | Status: SHIPPED | OUTPATIENT
Start: 2019-10-17 | End: 2020-03-05 | Stop reason: SDUPTHER

## 2019-11-27 DIAGNOSIS — E78.49 OTHER HYPERLIPIDEMIA: ICD-10-CM

## 2019-11-27 RX ORDER — GEMFIBROZIL 600 MG/1
TABLET, FILM COATED ORAL
Qty: 180 TABLET | Refills: 0 | Status: SHIPPED | OUTPATIENT
Start: 2019-11-27 | End: 2020-02-04

## 2019-12-02 ENCOUNTER — OFFICE VISIT (OUTPATIENT)
Dept: FAMILY MEDICINE | Facility: CLINIC | Age: 64
End: 2019-12-02
Payer: MEDICARE

## 2019-12-02 VITALS
RESPIRATION RATE: 18 BRPM | DIASTOLIC BLOOD PRESSURE: 80 MMHG | WEIGHT: 119 LBS | HEART RATE: 91 BPM | SYSTOLIC BLOOD PRESSURE: 136 MMHG | OXYGEN SATURATION: 95 % | HEIGHT: 61 IN | BODY MASS INDEX: 22.47 KG/M2 | TEMPERATURE: 99 F

## 2019-12-02 DIAGNOSIS — M89.69 POLIOMYELITIS OSTEOPATHY OF MULTIPLE SITES: ICD-10-CM

## 2019-12-02 DIAGNOSIS — R10.10 PAIN OF UPPER ABDOMEN: Primary | ICD-10-CM

## 2019-12-02 DIAGNOSIS — B91 POLIOMYELITIS OSTEOPATHY OF MULTIPLE SITES: ICD-10-CM

## 2019-12-02 DIAGNOSIS — H92.02 LEFT EAR PAIN: ICD-10-CM

## 2019-12-02 DIAGNOSIS — K21.9 GASTROESOPHAGEAL REFLUX DISEASE WITHOUT ESOPHAGITIS: ICD-10-CM

## 2019-12-02 PROBLEM — R79.89 ELEVATED LFTS: Status: RESOLVED | Noted: 2019-09-06 | Resolved: 2019-12-02

## 2019-12-02 PROCEDURE — 99999 PR PBB SHADOW E&M-EST. PATIENT-LVL V: ICD-10-PCS | Mod: PBBFAC,HCNC,, | Performed by: FAMILY MEDICINE

## 2019-12-02 PROCEDURE — 99214 PR OFFICE/OUTPT VISIT, EST, LEVL IV, 30-39 MIN: ICD-10-PCS | Mod: HCNC,S$GLB,, | Performed by: FAMILY MEDICINE

## 2019-12-02 PROCEDURE — 99999 PR PBB SHADOW E&M-EST. PATIENT-LVL V: CPT | Mod: PBBFAC,HCNC,, | Performed by: FAMILY MEDICINE

## 2019-12-02 PROCEDURE — 99214 OFFICE O/P EST MOD 30 MIN: CPT | Mod: HCNC,S$GLB,, | Performed by: FAMILY MEDICINE

## 2019-12-02 PROCEDURE — 3008F BODY MASS INDEX DOCD: CPT | Mod: HCNC,CPTII,S$GLB, | Performed by: FAMILY MEDICINE

## 2019-12-02 PROCEDURE — 3008F PR BODY MASS INDEX (BMI) DOCUMENTED: ICD-10-PCS | Mod: HCNC,CPTII,S$GLB, | Performed by: FAMILY MEDICINE

## 2019-12-02 RX ORDER — POLYETHYLENE GLYCOL 3350 17 G/17G
17 POWDER, FOR SOLUTION ORAL DAILY
COMMUNITY

## 2019-12-02 NOTE — PROGRESS NOTES
"FAMILY MEDICINE    Patient Active Problem List   Diagnosis    Other hyperlipidemia    Poliomyelitis osteopathy of multiple sites    Gastroesophageal reflux disease without esophagitis    Mitral valve prolapse    Localized osteoporosis without current pathological fracture    Lupus anticoagulant syndrome    Muscle spasm    History of hysterectomy    Pulmonary nodule    Pain of upper abdomen    Left ear pain       CC:   Chief Complaint   Patient presents with    Abdominal Pain     starts at breast bone and goes under breast to right ribs     Neck Pain     left side, shoots pain into ear     Otalgia     left        SUBJECTIVE:  Julia Fernández   is a 64 y.o. female  - with hyperlipidemia, anxiety, osteoporosis (Rheumatologist Dr. Alana Stafford), lupus anticoagulant with h/o polio with right leg weakness and recurrent muscle spasms presents for abdominal pain and left ear pain    1. Abdominal pain  - reports history of pancreatitis x 3 in the past and reports that these symptoms are more mild  - reports that she switched her Pantoprazole to Nexium without relief  - reports bowel movements are well controlled on Miralax and constipation well controlled. Last BM this AM "normal" and formed  - no pain yesterday or today and last episode 2 days ago    Onset: about 2 week ago  Location: epigastric and LUQ  Duration: several minutes to hours  Character: soreness to burning pain 4/10  Aggravating factors: not eating  Relieving factors: eating  Timing of day: anytime  Associated symptoms: nausea without vomiting, decreased appetite  Negative symptoms: denies vomiting, diarrhea, constipation, fever, chills, weakness, chest pain, palpitation, shortness of breath, dyspnea on exertion     2. Left ear pain  - has seen ENT Dr. Mcbride in the past    Onset: 1 month ago with lymph node swelling lower posterior left ear that is painful as well  Location: left inner ear and left posterior auricular lymph node  Duration: " "intermittent several minutes to hours  Character: soreness to sharp pain 8/10  Aggravating factors: unsure  Relieving factors: nothing  Timing of day: anytime  Associated symptoms: see above, left sided neck pain  Negative symptoms: denies hearing loss, tinnitus, headache, congestion, sinus pressure           ROS: Review of Systems   Constitutional: Negative.    HENT: Positive for ear pain.         Otherwise negative   Eyes: Negative.    Respiratory: Negative.    Cardiovascular: Negative.    Gastrointestinal: Positive for abdominal pain.        Otherwise negative   Endocrine: Negative.    Genitourinary: Negative.    Musculoskeletal: Negative.    Skin: Negative.    Allergic/Immunologic: Negative.    Neurological: Positive for weakness (chronic postpolio with foot drop).   Hematological: Positive for adenopathy.        Otherwise negative   Psychiatric/Behavioral: Negative.        Past Medical History:   Diagnosis Date    Anxiety     Arthritis     Clotting disorder     Lupus anticoagulant    GERD (gastroesophageal reflux disease)     High cholesterol     High triglycerides     Lupus     "Lupus anticoagulant"    Mitral valve prolapse     Osteoporosis     Pancreatitis     Polio     9 month old with right leg discrepency and weaknes    Scoliosis        Past Surgical History:   Procedure Laterality Date    ADENOIDECTOMY      APPENDECTOMY       SECTION      x1    CHOLECYSTECTOMY      COLONOSCOPY      EYE SURGERY Right     lower eyelid ("growth removed")    HYSTERECTOMY      endometriosis    TONSILLECTOMY      TUBAL LIGATION         Family History   Problem Relation Age of Onset    Arthritis Mother     Diabetes Mother     Hearing loss Mother     Vision loss Mother     COPD Mother     Depression Mother     Vision loss Father     Cancer Father         Pituitary tumor    Heart disease Father         CABG    Diabetes Sister     Hyperlipidemia Sister     Hypertension Sister     " Hearing loss Sister     Drug abuse Brother     Alcohol abuse Brother     Early death Brother     Hypertension Brother     No Known Problems Son        Social History     Tobacco Use    Smoking status: Former Smoker     Packs/day: 1.00     Years: 29.00     Pack years: 29.00     Start date:      Last attempt to quit:      Years since quittin.9    Smokeless tobacco: Never Used   Substance Use Topics    Alcohol use: No    Drug use: No       Social History     Social History Narrative    . 1 son. 3 grandchildren (2 girls and 1 boy). Disability       ALLERGIES:   Review of patient's allergies indicates:   Allergen Reactions    Gabapentin Other (See Comments)     dizzy    Prevnar 13 [pneumoc 13-bethany conj-dip cr(pf)] Swelling and Rash       MEDS:   Current Outpatient Medications:     aspirin (ECOTRIN) 81 MG EC tablet, Take 81 mg by mouth once daily., Disp: , Rfl:     baclofen (LIORESAL) 20 MG tablet, TAKE 1 TABLET THREE TIMES DAILY, Disp: 270 tablet, Rfl: 0    calcium carbonate-vitamin D3 (CALTRATE 600 + D) 600 mg (1,500 mg)-800 unit Chew, Take by mouth 2 (two) times daily., Disp: , Rfl:     gemfibrozil (LOPID) 600 MG tablet, TAKE 1 TABLET TWICE DAILY BEFORE MEALS, Disp: 180 tablet, Rfl: 0    hydrocodone-acetaminophen 10-325mg (NORCO)  mg Tab, Take by mouth 2 (two) times daily as needed., Disp: , Rfl:     melatonin 3 mg Tab, Take 6 mg by mouth., Disp: , Rfl:     pantoprazole (PROTONIX) 40 MG tablet, TAKE 1 TABLET EVERY DAY, Disp: 90 tablet, Rfl: 1    polyethylene glycol (GLYCOLAX) 17 gram PwPk, Take by mouth., Disp: , Rfl:     rosuvastatin (CRESTOR) 10 MG tablet, TAKE 1 TABLET EVERY DAY, Disp: 90 tablet, Rfl: 3    zoledronic acid-mannitol & water (RECLAST) 5 mg/100 mL Soln, Inject 5 mg into the vein once., Disp: , Rfl:     OBJECTIVE:   Vitals:    19 1114   BP: 136/80   BP Location: Left arm   Patient Position: Sitting   BP Method: Large (Manual)   Pulse: 91   Resp: 18  "  Temp: 98.7 °F (37.1 °C)   TempSrc: Oral   SpO2: 95%   Weight: 54 kg (119 lb)   Height: 5' 1" (1.549 m)     Body mass index is 22.48 kg/m².    Physical Exam   Constitutional: No distress.   HENT:   Head: Normocephalic and atraumatic.   Right Ear: Tympanic membrane and ear canal normal.   Left Ear: Ear canal normal.   Ears:    Nose: Nose normal.   Neck: Neck supple.   Cardiovascular: Normal rate, regular rhythm, normal heart sounds and intact distal pulses. Exam reveals no gallop and no friction rub.   No murmur heard.  Pulmonary/Chest: Effort normal and breath sounds normal.   Musculoskeletal: She exhibits no edema.   Lymphadenopathy:        Head (left side): Posterior auricular (soft though tender and mobile; singular node enlarged) adenopathy present.   Neurological: She is alert.         PERTINENT RESULTS:   Lab Results   Component Value Date    ALT 24 09/06/2019    AST 30 09/06/2019    ALKPHOS 73 09/06/2019    BILITOT 0.4 09/06/2019     BMP  Lab Results   Component Value Date     05/06/2019    K 4.1 05/06/2019     05/06/2019    CO2 24 05/06/2019    BUN 18 (H) 05/06/2019    CREATININE 0.48 (L) 05/06/2019    CALCIUM 10.2 05/06/2019    ANIONGAP 13 05/06/2019    ESTGFRAFRICA >60.0 05/06/2019    EGFRNONAA >60.0 05/06/2019       ASSESSMENT/PLAN:  Problem List Items Addressed This Visit        ENT    Left ear pain    Current Assessment & Plan     - with posterior auricular node  - no signs of infection  - appears to be dried hematoma on the left TM though unsure  - refer to ENT Dr. Toñito Mcbride         Relevant Orders    Ambulatory referral to ENT       GI    Gastroesophageal reflux disease without esophagitis    Current Assessment & Plan     - referral to GI         Relevant Orders    Ambulatory referral to Gastroenterology    Pain of upper abdomen - Primary    Current Assessment & Plan     - epigastric and LUQ  - check US abdomen  - history of pancreatitis so discussed if any worsening, nausea, " vomiting or fever then recommend to ER  - concern for gastritis vs ulceration  - continue PPI  - refer to GI and may need endoscopy         Relevant Orders    Ambulatory referral to Gastroenterology    US Abdomen Complete       Orthopedic    Poliomyelitis osteopathy of multiple sites    Current Assessment & Plan     - stable and no acute issues  - ambulating well and pain controlled               ORDERS:   Orders Placed This Encounter    US Abdomen Complete    Ambulatory referral to Gastroenterology    Ambulatory referral to ENT     Vaccines recommended: flu, Tdap and Shingles. Pt declined tetanus and Shingles    Follow-up in 1-2 months or sooner if no improvement.     Dr. Almaz Tavares D.O.   Family Medicine

## 2019-12-02 NOTE — ASSESSMENT & PLAN NOTE
- with posterior auricular node  - no signs of infection  - appears to be dried hematoma on the left TM though unsure  - refer to ENT Dr. Toñito Mcbride

## 2019-12-02 NOTE — ASSESSMENT & PLAN NOTE
- epigastric and LUQ  - check US abdomen  - history of pancreatitis so discussed if any worsening, nausea, vomiting or fever then recommend to ER  - concern for gastritis vs ulceration  - continue PPI  - refer to GI and may need endoscopy

## 2020-01-08 ENCOUNTER — OFFICE VISIT (OUTPATIENT)
Dept: GASTROENTEROLOGY | Facility: CLINIC | Age: 65
End: 2020-01-08
Payer: MEDICARE

## 2020-01-08 VITALS
SYSTOLIC BLOOD PRESSURE: 122 MMHG | WEIGHT: 120 LBS | DIASTOLIC BLOOD PRESSURE: 82 MMHG | BODY MASS INDEX: 22.67 KG/M2 | HEART RATE: 83 BPM

## 2020-01-08 DIAGNOSIS — K59.04 CHRONIC IDIOPATHIC CONSTIPATION: ICD-10-CM

## 2020-01-08 DIAGNOSIS — R13.19 ESOPHAGEAL DYSPHAGIA: ICD-10-CM

## 2020-01-08 DIAGNOSIS — Z87.19 HISTORY OF ACUTE PANCREATITIS: ICD-10-CM

## 2020-01-08 DIAGNOSIS — K21.9 GASTROESOPHAGEAL REFLUX DISEASE, ESOPHAGITIS PRESENCE NOT SPECIFIED: ICD-10-CM

## 2020-01-08 DIAGNOSIS — R10.10 PAIN OF UPPER ABDOMEN: Primary | ICD-10-CM

## 2020-01-08 PROBLEM — R13.10 DYSPHAGIA: Status: ACTIVE | Noted: 2020-01-08

## 2020-01-08 PROCEDURE — 99204 PR OFFICE/OUTPT VISIT, NEW, LEVL IV, 45-59 MIN: ICD-10-PCS | Mod: HCNC,S$GLB,, | Performed by: INTERNAL MEDICINE

## 2020-01-08 PROCEDURE — 99204 OFFICE O/P NEW MOD 45 MIN: CPT | Mod: HCNC,S$GLB,, | Performed by: INTERNAL MEDICINE

## 2020-01-08 PROCEDURE — 99999 PR PBB SHADOW E&M-EST. PATIENT-LVL III: CPT | Mod: PBBFAC,HCNC,, | Performed by: INTERNAL MEDICINE

## 2020-01-08 PROCEDURE — 99999 PR PBB SHADOW E&M-EST. PATIENT-LVL III: ICD-10-PCS | Mod: PBBFAC,HCNC,, | Performed by: INTERNAL MEDICINE

## 2020-01-08 PROCEDURE — 3008F PR BODY MASS INDEX (BMI) DOCUMENTED: ICD-10-PCS | Mod: HCNC,CPTII,S$GLB, | Performed by: INTERNAL MEDICINE

## 2020-01-08 PROCEDURE — 3008F BODY MASS INDEX DOCD: CPT | Mod: HCNC,CPTII,S$GLB, | Performed by: INTERNAL MEDICINE

## 2020-01-08 NOTE — LETTER
January 8, 2020      Almaz Tavares, DO  [address]  [address]   [facility] [address]           [place] [facility]  [address], [address]   [facility] [address]  Phone: [phone]  Fax: [phone]          Patient: Julia Fernández   MR Number: [id number]   YOB: 1955   Date of Visit: 1/8/2020       Dear Dr. Almaz Tavares:    Thank you for referring Julia Fernández to me for evaluation. Attached you will find relevant portions of my assessment and plan of care.    If you have questions, please do not hesitate to call me. I look forward to following Julia Fernández along with you.    Sincerely,    Lakisha Angel MD    Enclosure  CC:  No Recipients    If you would like to receive this communication electronically, please contact [email or web address] or [phone] to request more information on [email or web address] Link access.    For providers and/or their staff who would like to refer a patient to [facility], please contact us through our one-stop-shop provider referral line, [facility], at [phone].    If you feel you have received this communication in error or would no longer like to receive these types of communications, please e-mail [email or web address]

## 2020-01-08 NOTE — PROGRESS NOTES
"Subjective:       Patient ID: Julia Fernández is a 64 y.o. female.    Chief Complaint: Abdominal Pain; Constipation; and Dysphagia    63 yo F complains of upper abdominal pain and dysphagia.  She has long h/o GERD well managed on PPI.  This pain began in Nov.  She switched her PPI thinking that might help, but it did not.  She states that she eats, but feels the food "build up."  The last things she eats seem to stay stuck in her "throat" but there is no coughing.  Drinking water does not help, and actually just makes her feel even more full.  The pain is improved if she has something in her stomach so she has been eating frequent smaller meals and is gaining weight.  If she allows her stomach to get empty, the pain is much worse.  She denies vomiting.  She states that she had an EGD more than 10 years ago and had dilation at that time.  At that time she frequently had an "ammonia or chemical" smell that would come out of her mouth.  She has only experienced that once recently.    She has long h/o constipation.  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  Her pain management doctor sent a med for constipation (Movantik? Linzess?) but despite it being sent to her mail pharmacy, the copay was still over $100 so she did not get it.  She was taking daily Miralax for several months with ok results, but stopped last week in favor of Dulcolax.  She states that years ago she used to use stimulant laxatives exclusively.  She never allows herself to go more than 2-3 days without a BM.    She has h/o pancreatitis x 3.  Her gallbladder was removed prior to the first episode of pancreatitis.  At that time she was a heavy drinker and states that since she quit drinking more than 10 years ago, she has not had any further issues with her pancreas.    Review of Systems   Constitutional: Negative for appetite change and unexpected weight change. "   Eyes: Negative for photophobia and visual disturbance.   Respiratory: Negative for chest tightness, shortness of breath and wheezing.    Cardiovascular: Negative for chest pain, palpitations and leg swelling.   Genitourinary: Negative for dysuria, flank pain and hematuria.   Musculoskeletal: Negative for joint swelling and myalgias.   Skin: Negative for color change and rash.   Neurological: Negative for dizziness and speech difficulty.   Psychiatric/Behavioral: Negative for confusion and hallucinations.       Objective:       /82 (BP Location: Right arm, Patient Position: Sitting, BP Method: Medium (Manual))   Pulse 83   Wt 54.4 kg (120 lb)   BMI 22.67 kg/m²     Physical Exam   Constitutional: She is oriented to person, place, and time. She appears well-developed and well-nourished.   Eyes: Pupils are equal, round, and reactive to light. EOM are normal.   Neck: Normal range of motion. Neck supple.   Cardiovascular: Normal rate and regular rhythm.   Pulmonary/Chest: Effort normal and breath sounds normal.   Abdominal: Soft. Bowel sounds are normal. She exhibits no distension and no mass. There is no tenderness. There is no rebound and no guarding.   Musculoskeletal: Normal range of motion. She exhibits no edema.   Neurological: She is alert and oriented to person, place, and time.   Psychiatric: She has a normal mood and affect. Her behavior is normal. Judgment and thought content normal.       Lab Results   Component Value Date    WBC 5.72 04/14/2019    HGB 12.0 04/14/2019    HCT 36.6 (L) 04/14/2019    MCV 90 04/14/2019     04/14/2019     U/s was independently visualized and reviewed by me and showed poor visualization of the pancreas, o/w normal.  CT 4/2019 was independently visualized and reviewed by me and showed no pancreatic calcifications.    Assessment:       1. Pain of upper abdomen    2. Esophageal dysphagia    3. Gastroesophageal reflux disease, esophagitis presence not specified    4.  "Chronic idiopathic constipation    5. History of acute pancreatitis        Plan:       Pain of upper abdomen; Esophageal dysphagia; Gastroesophageal reflux disease, esophagitis presence not specified  -     Case request GI: EGD (ESOPHAGOGASTRODUODENOSCOPY)  -     Cont PPI  -     If EGD is unremarkable, will do both esophagram and GES    Chronic idiopathic constipation        -     Daily Miralax, likely forever.  If this is not sufficient, she can do a "clean out" every 2 weeks using an OTC magnesium compound.  Ideally I would like for her to have Movantik or Linzess but her copay is very high for these and she can not afford    History of acute pancreatitis        -     Likely alcohol induced        -     No signs of chronic pancreatitis and no calcifications seen on most recent CT therefore no further evaluation at this time.         "

## 2020-01-08 NOTE — PATIENT INSTRUCTIONS
EGD Prep Instructions    [facility] [address] [place]  [address]    You are scheduled for an EGD with Dr. Angel on Friday, January 24 at [facility].  You will enter through the [facility] entrance and check in at [facility].    Nothing to eat or drink after midnight before the procedure.  You MAY brush your teeth.    You MAY take your blood pressure, heart, and seizure medication on the morning of the procedure, with a SIP of water.  Hold ALL other medications until after the procedure.    If you are on blood thinners THAT YOU HAVE BEEN INSTRUCTED TO HOLD BY YOUR DOCTOR FOR THIS PROCEDURE, then do NOT take this the morning of your EGD.  Do NOT stop these medications on your own, they must be approved to be held by your doctor.  Your EGD can NOT be done if you are on these medications.  Examples of blood thinners include: Coumadin, Aggrenox, Plavix, Pradaxa, Reapro, Pletal, Xarelto, Ticagrelor, Brilinta, Eliquis, and high dose aspirin (325 mg).  You do not have to stop baby aspirin 81 mg.    You will receive a call a few days before your EGD to tell you the time to arrive.  If you have not received a call by the day before your procedure, call the Pre-op Coordinator at [phone].              Abdominal Pain    Abdominal pain is pain in the stomach or belly area. Everyone has this pain from time to time. In many cases it goes away on its own. But abdominal pain can sometimes be due to a serious problem, such as appendicitis. So its important to know when to seek help.  Causes of abdominal pain  There are many possible causes of abdominal pain. Common causes in adults include:  · Constipation, diarrhea, or gas  · Stomach acid flowing back up into the esophagus (acid reflux or heartburn)  · Severe acid reflux, called GERD (gastroesophageal reflux disease)  · A sore in the lining of the stomach or small intestine (peptic ulcer)  · Inflammation of the  gallbladder, liver, or pancreas  · Gallstones or kidney stones  · Appendicitis   · Intestinal blockage   · An internal organ pushing through a muscle or other tissue (hernia)  · Urinary tract infections  · In women, menstrual cramps, fibroids, or endometriosis  · Inflammation or infection of the intestines  Diagnosing the cause of abdominal pain  Your healthcare provider will do a physical exam help find the cause of your pain. If needed, tests will be ordered. Belly pain has many possible causes. So it can be hard to find the reason for your pain. Giving details about your pain can help. Tell your provider where and when you feel the pain, and what makes it better or worse. Also let your provider know if you have other symptoms such as:  · Fever  · Tiredness  · Upset stomach (nausea)  · Vomiting  · Changes in bathroom habits  Treating abdominal pain  Some causes of pain need emergency medical treatment right away. These include appendicitis or a bowel blockage. Other problems can be treated with rest, fluids, or medicines. Your healthcare provider can give you specific instructions for treatment or self-care based on what is causing your pain.  If you have vomiting or diarrhea, sip water or other clear fluids. When you are ready to eat solid foods again, start with small amounts of easy-to-digest, low-fat foods. These include apple sauce, toast, or crackers.   When to seek medical care  Call 911 or go to the hospital right away if you:  · Cant pass stool and are vomiting  · Are vomiting blood or have bloody diarrhea or black, tarry diarrhea  · Have chest, neck, or shoulder pain  · Feel like you might pass out  · Have pain in your shoulder blades with nausea  · Have sudden, severe belly pain  · Have new, severe pain unlike any you have felt before  · Have a belly that is rigid, hard, and tender to touch  Call your healthcare provider if you have:  · Pain for more than 5 days  · Bloating for more than  2 days  · Diarrhea for more than 5 days  · A fever of 100.4°F (38.0°C) or higher, or as directed by your provider  · Pain that gets worse  · Weight loss for no reason  · Continued lack of appetite  · Blood in your stool  How to prevent abdominal pain  Here are some tips to help prevent abdominal pain:  · Eat smaller amounts of food at one time.  · Avoid greasy, fried, or other high-fat foods.  · Avoid foods that give you gas.  · Exercise regularly.  · Drink plenty of fluids.  To help prevent GERD symptoms:  · Quit smoking.  · Reduce alcohol and certain foods that increase stomach acid.  · Avoid aspirin and over-the-counter pain and fever medicines (NSAIDS or nonsteroidal anti-inflammatory drugs), if possible  · Lose extra weight.  · Finish eating at least 2 hours before you go to bed or lie down.  · Raise the head of your bed.  Date Last Reviewed: 7/1/2016  © 7493-3538 [employer]. [address] [place] [address], [place] [address]. All rights reserved. This information is not intended as a substitute for professional medical care. Always follow your healthcare professional's instructions.

## 2020-01-24 PROBLEM — R10.10 UPPER ABDOMINAL PAIN: Status: ACTIVE | Noted: 2020-01-24

## 2020-02-03 DIAGNOSIS — E78.49 OTHER HYPERLIPIDEMIA: ICD-10-CM

## 2020-02-04 RX ORDER — GEMFIBROZIL 600 MG/1
TABLET, FILM COATED ORAL
Qty: 180 TABLET | Refills: 3 | Status: SHIPPED | OUTPATIENT
Start: 2020-02-04 | End: 2020-11-13

## 2020-02-07 ENCOUNTER — TELEPHONE (OUTPATIENT)
Dept: GASTROENTEROLOGY | Facility: CLINIC | Age: 65
End: 2020-02-07

## 2020-02-07 NOTE — TELEPHONE ENCOUNTER
----- Message from Lakisha Angel MD sent at 2/3/2020  1:46 PM CST -----  Benign fundic gland polyp, no further intervention needed for this.  EoE (-), HP (-), celiac (-).  Please schedule GES and esophagram ordered at EGD appt.

## 2020-02-12 DIAGNOSIS — K21.9 GASTROESOPHAGEAL REFLUX DISEASE WITHOUT ESOPHAGITIS: ICD-10-CM

## 2020-02-13 RX ORDER — PANTOPRAZOLE SODIUM 40 MG/1
TABLET, DELAYED RELEASE ORAL
Qty: 90 TABLET | Refills: 1 | Status: SHIPPED | OUTPATIENT
Start: 2020-02-13 | End: 2020-06-22

## 2020-02-21 ENCOUNTER — PATIENT OUTREACH (OUTPATIENT)
Dept: ADMINISTRATIVE | Facility: HOSPITAL | Age: 65
End: 2020-02-21

## 2020-02-21 ENCOUNTER — TELEPHONE (OUTPATIENT)
Dept: GASTROENTEROLOGY | Facility: CLINIC | Age: 65
End: 2020-02-21

## 2020-02-21 DIAGNOSIS — R91.8 ABNORMAL FINDINGS ON DIAGNOSTIC IMAGING OF LUNG: ICD-10-CM

## 2020-02-21 NOTE — TELEPHONE ENCOUNTER
----- Message from Lakisha Angel MD sent at 2/21/2020  1:30 PM CST -----  GES normal.  Please schedule esophagram.

## 2020-03-05 PROBLEM — R13.10 DYSPHAGIA: Status: RESOLVED | Noted: 2020-01-08 | Resolved: 2020-03-05

## 2020-03-05 PROBLEM — H92.02 LEFT EAR PAIN: Status: RESOLVED | Noted: 2019-12-02 | Resolved: 2020-03-05

## 2020-03-05 PROBLEM — R10.10 PAIN OF UPPER ABDOMEN: Status: RESOLVED | Noted: 2019-12-02 | Resolved: 2020-03-05

## 2020-03-05 PROBLEM — R10.10 UPPER ABDOMINAL PAIN: Status: RESOLVED | Noted: 2020-01-24 | Resolved: 2020-03-05

## 2020-03-05 NOTE — PROGRESS NOTES
FAMILY MEDICINE    Patient Active Problem List   Diagnosis    Mixed hyperlipidemia    Poliomyelitis osteopathy of multiple sites    GERD (gastroesophageal reflux disease)    Mitral valve prolapse    Localized osteoporosis without current pathological fracture    Lupus anticoagulant syndrome    Muscle spasm    History of hysterectomy    Pulmonary nodule    Chronic idiopathic constipation    History of acute pancreatitis    Esophageal motility disorder    COPD (chronic obstructive pulmonary disease)       CC:   Chief Complaint   Patient presents with    Follow-up       SUBJECTIVE:  Julia Fernández   is a 64 y.o. female  - with hyperlipidemia, anxiety, osteoporosis (Rheumatologist Dr. Alana Stafford), lupus anticoagulant with h/o polio with right leg weakness and recurrent muscle spasms, pulmonary nodules, mitral valve prolapse, COPD, pulmonary nodules, esophageal dysmotility, chronic constipation and hypertriglyceridemia presents for routine follow-up.    Rheumatology Dr. Alana Stafford  Pulmonary Dr. Gaudencio Park  Cardiology Dr. Ernandez   GI: Dr. Lakisha Angel    Since last visit was seen by ENT and she had Shingles on her TM which was causing her pain. Has since resolved and no issues. She was also seen by Dr. Angel for dysphagia and constipation. GES normal and decreased esophageal motility noted on esophagram. Pt reports constipation well controlled on new regimen of Miralax daily and MOM PRN. Swallowing issues tolerable and unsure if she will pursue manometry.      1. Post-polio syndrome with muscle spasm     Symptoms: spasms of legs and hands     Prior treatments: Soma (cause fatigue and daytime sleepiness)     Current medications:   baclofen (LIORESAL) 20 MG tablet, TAKE 1 TABLET THREE TIMES DAILY, Disp: 270 tablet, Rfl: 0  hydrocodone-acetaminophen 10-325mg (NORCO)  mg Tab, Take by mouth 2 (two) times daily as needed., Disp: , Rfl: (followed by pain management)     Mobility: doing well with out  walker or cane. +right ankle brace to prevent foot drop     2. Hyperlipidemia     Current treatment:  gemfibrozil (LOPID) 600 MG tablet, Take 1 tablet (600 mg total) by mouth 2 (two) times daily before meals., Disp: 180 tablet, Rfl: 3  rosuvastatin (CRESTOR) 10 MG tablet, TAKE 1 TABLET EVERY DAY, Disp: 90 tablet, Rfl: 3     Side effects from current treatment: denies     Lab Results       Component                Value               Date                       CHOL                     142                 03/02/2020                 CHOL                     130                 04/13/2019                 CHOL                     166                 02/06/2019            Lab Results       Component                Value               Date                       HDL                      44                  03/02/2020                 HDL                      47                  04/13/2019                 HDL                      65                  02/06/2019            Lab Results       Component                Value               Date                       LDLCALC                  75.8                03/02/2020                 LDLCALC                  69.6                04/13/2019                 LDLCALC                  89.6                02/06/2019            Lab Results       Component                Value               Date                       TRIG                     111                 03/02/2020                 TRIG                     67                  04/13/2019                 TRIG                     57                  02/06/2019            Lab Results       Component                Value               Date                       CHOLHDL                  31.0                03/02/2020                 CHOLHDL                  36.2                04/13/2019                 CHOLHDL                  39.2                02/06/2019                  ROS: Review of Systems   Constitutional: Negative.    HENT:  "Negative.    Eyes: Negative.    Respiratory: Negative.    Cardiovascular: Negative.    Gastrointestinal: Negative.    Endocrine: Negative.    Genitourinary: Negative.    Musculoskeletal: Positive for arthralgias and myalgias.   Skin: Negative.    Allergic/Immunologic: Negative.    Neurological: Positive for weakness.   Hematological: Negative.    Psychiatric/Behavioral: Negative.        Past Medical History:   Diagnosis Date    Anxiety     Arthritis     Chronic obstructive pulmonary disease 3/6/2020    Clotting disorder     Lupus anticoagulant    GERD (gastroesophageal reflux disease)     High cholesterol     High triglycerides     Lupus     "Lupus anticoagulant"    Mitral valve prolapse     Osteoporosis     Pancreatitis     Polio     9 month old with right leg discrepency and weaknes    Scoliosis        Past Surgical History:   Procedure Laterality Date    ADENOIDECTOMY      APPENDECTOMY       SECTION      x1    CHOLECYSTECTOMY      COLONOSCOPY      ESOPHAGOGASTRODUODENOSCOPY N/A 2020    Procedure: EGD (ESOPHAGOGASTRODUODENOSCOPY);  Surgeon: Lakisha Angel MD;  [facility];  Service: Endoscopy;  Laterality: N/A;    EYE SURGERY Right     lower eyelid ("growth removed")    HYSTERECTOMY      endometriosis    TONSILLECTOMY      TUBAL LIGATION         Family History   Problem Relation Age of Onset    Arthritis Mother     Diabetes Mother     Hearing loss Mother     Vision loss Mother     COPD Mother     Depression Mother     Vision loss Father     Cancer Father         Pituitary tumor    Heart disease Father         CABG    Diabetes Sister     Hyperlipidemia Sister     Hypertension Sister     Hearing loss Sister     Drug abuse Brother     Alcohol abuse Brother     Early death Brother     Hypertension Brother     No Known Problems Son        Social History     Tobacco Use    Smoking status: Former Smoker     Packs/day: 1.00     Years: 29.00     Pack " years: 29.00     Start date: 1976     Last attempt to quit: 2005     Years since quitting: 15.1    Smokeless tobacco: Never Used   Substance Use Topics    Alcohol use: No    Drug use: No       Social History     Social History Narrative    . 1 son. 3 grandchildren (2 girls and 1 boy). Disability       ALLERGIES:   Review of patient's allergies indicates:   Allergen Reactions    Gabapentin Other (See Comments)     dizzy    Prevnar 13 [pneumoc 13-bethany conj-dip cr(pf)] Swelling and Rash       MEDS:   Current Outpatient Medications:     aspirin (ECOTRIN) 81 MG EC tablet, Take 81 mg by mouth once daily., Disp: , Rfl:     calcium carbonate-vitamin D3 (CALTRATE 600 + D) 600 mg (1,500 mg)-800 unit Chew, Take by mouth 2 (two) times daily., Disp: , Rfl:     gemfibrozil (LOPID) 600 MG tablet, TAKE 1 TABLET TWICE DAILY BEFORE MEALS, Disp: 180 tablet, Rfl: 3    hydrocodone-acetaminophen 10-325mg (NORCO)  mg Tab, Take by mouth 2 (two) times daily as needed., Disp: , Rfl:     melatonin 3 mg Tab, Take 6 mg by mouth., Disp: , Rfl:     pantoprazole (PROTONIX) 40 MG tablet, TAKE 1 TABLET EVERY DAY, Disp: 90 tablet, Rfl: 1    polyethylene glycol (GLYCOLAX) 17 gram PwPk, Take by mouth., Disp: , Rfl:     baclofen (LIORESAL) 20 MG tablet, Take 1 tablet (20 mg total) by mouth 3 (three) times daily., Disp: 270 tablet, Rfl: 1    rosuvastatin (CRESTOR) 10 MG tablet, Take 1 tablet (10 mg total) by mouth once daily., Disp: 90 tablet, Rfl: 3    Current Facility-Administered Medications:     acetaminophen tablet 500 mg, 500 mg, Oral, PRN, Almaz Tavares,     heparin, porcine (PF) 100 unit/mL injection flush 500 Units, 500 Units, Intravenous, PRN, Almaz Tavares,     sodium chloride 0.9% flush 10 mL, 10 mL, Intravenous, PRN, Almaz Tavares,     zoledronic acid-mannitol & water 5 mg/100 mL infusion 5 mg, 5 mg, Intravenous, 1 time in Clinic/HOD, Almaz Tavares DO    OBJECTIVE:   Vitals:    03/06/20 0722   BP: 130/78  "  BP Location: Left arm   Patient Position: Sitting   BP Method: Medium (Manual)   Pulse: 82   Resp: 18   Temp: 98.8 °F (37.1 °C)   TempSrc: Oral   SpO2: 98%   Weight: 56.1 kg (123 lb 9.6 oz)   Height: 5' 1" (1.549 m)     Body mass index is 23.35 kg/m².    Physical Exam   Constitutional: No distress.   Neck: Neck supple.   Cardiovascular: Normal rate, regular rhythm, normal heart sounds and intact distal pulses. Exam reveals no gallop and no friction rub.   No murmur heard.  Pulmonary/Chest: Effort normal and breath sounds normal.   Musculoskeletal: She exhibits no edema.   Neurological: She is alert.         PERTINENT RESULTS:   Lab Visit on 03/02/2020   Component Date Value Ref Range Status    Sodium 03/02/2020 141  136 - 145 mmol/L Final    Potassium 03/02/2020 4.1  3.5 - 5.1 mmol/L Final    Chloride 03/02/2020 106  95 - 110 mmol/L Final    CO2 03/02/2020 26  23 - 29 mmol/L Final    Glucose 03/02/2020 98  70 - 110 mg/dL Final    BUN, Bld 03/02/2020 16  7 - 17 mg/dL Final    Creatinine 03/02/2020 0.52  0.50 - 1.40 mg/dL Final    Calcium 03/02/2020 10.1  8.7 - 10.5 mg/dL Final    Total Protein 03/02/2020 7.6  6.0 - 8.4 g/dL Final    Albumin 03/02/2020 4.8  3.5 - 5.2 g/dL Final    Total Bilirubin 03/02/2020 0.5  0.1 - 1.0 mg/dL Final    Comment: For infants and newborns, interpretation of results should be based  on gestational age, weight and in agreement with clinical  observations.  Premature Infant recommended reference ranges:  Up to 24 hours.............<8.0 mg/dL  Up to 48 hours............<12.0 mg/dL  3-5 days..................<15.0 mg/dL  6-29 days.................<15.0 mg/dL      Alkaline Phosphatase 03/02/2020 68  38 - 126 U/L Final    AST 03/02/2020 31  15 - 46 U/L Final    ALT 03/02/2020 26  10 - 44 U/L Final    Anion Gap 03/02/2020 9  8 - 16 mmol/L Final    eGFR if African American 03/02/2020 >60.0  >60 mL/min/1.73 m^2 Final    eGFR if non African American 03/02/2020 >60.0  >60 " mL/min/1.73 m^2 Final    Comment: Calculation used to obtain the estimated glomerular filtration  rate (eGFR) is the CKD-EPI equation.       Cholesterol 03/02/2020 142  120 - 199 mg/dL Final    Comment: The National Cholesterol Education Program (NCEP) has set the  following guidelines (reference ranges) for Cholesterol:  Optimal.....................<200 mg/dL  Borderline High.............200-239 mg/dL  High........................> or = 240 mg/dL      Triglycerides 03/02/2020 111  30 - 150 mg/dL Final    Comment: The National Cholesterol Education Program (NCEP) has set the  following guidelines (reference values) for triglycerides:  Normal......................<150 mg/dL  Borderline High.............150-199 mg/dL  High........................200-499 mg/dL      HDL 03/02/2020 44  40 - 75 mg/dL Final    Comment: The National Cholesterol Education Program (NCEP) has set the  following guidelines (reference values) for HDL Cholesterol:  Low...............<40 mg/dL  Optimal...........>60 mg/dL      LDL Cholesterol 03/02/2020 75.8  63.0 - 159.0 mg/dL Final    Comment: The National Cholesterol Education Program (NCEP) has set the  following guidelines (reference values) for LDL Cholesterol:  Optimal.......................<130 mg/dL  Borderline High...............130-159 mg/dL  High..........................160-189 mg/dL  Very High.....................>190 mg/dL      Hdl/Cholesterol Ratio 03/02/2020 31.0  20.0 - 50.0 % Final    Total Cholesterol/HDL Ratio 03/02/2020 3.2  2.0 - 5.0 Final    Non-HDL Cholesterol 03/02/2020 98  mg/dL Final    Comment: Risk category and Non-HDL cholesterol goals:  Coronary heart disease (CHD)or equivalent (10-year risk of CHD >20%):  Non-HDL cholesterol goal     <130 mg/dL  Two or more CHD risk factors and 10-year risk of CHD <= 20%:  Non-HDL cholesterol goal     <160 mg/dL  0 to 1 CHD risk factor:  Non-HDL cholesterol goal     <190 mg/dL      WBC 03/02/2020 5.09  3.90 - 12.70 K/uL  Final    RBC 03/02/2020 4.70  4.00 - 5.40 M/uL Final    Hemoglobin 03/02/2020 13.8  12.0 - 16.0 g/dL Final    Hematocrit 03/02/2020 43.0  37.0 - 48.5 % Final    Mean Corpuscular Volume 03/02/2020 92  82 - 98 fL Final    Mean Corpuscular Hemoglobin 03/02/2020 29.4  27.0 - 31.0 pg Final    Mean Corpuscular Hemoglobin Conc 03/02/2020 32.1  32.0 - 36.0 g/dL Final    RDW 03/02/2020 12.1  11.5 - 14.5 % Final    Platelets 03/02/2020 231  150 - 350 K/uL Final    MPV 03/02/2020 11.0  9.2 - 12.9 fL Final    Immature Granulocytes 03/02/2020 0.6* 0.0 - 0.5 % Final    Gran # (ANC) 03/02/2020 3.2  1.8 - 7.7 K/uL Final    Immature Grans (Abs) 03/02/2020 0.03  0.00 - 0.04 K/uL Final    Comment: Mild elevation in immature granulocytes is non specific and   can be seen in a variety of conditions including stress response,   acute inflammation, trauma and pregnancy. Correlation with other   laboratory and clinical findings is essential.      Lymph # 03/02/2020 1.2  1.0 - 4.8 K/uL Final    Mono # 03/02/2020 0.5  0.3 - 1.0 K/uL Final    Eos # 03/02/2020 0.2  0.0 - 0.5 K/uL Final    Baso # 03/02/2020 0.03  0.00 - 0.20 K/uL Final    nRBC 03/02/2020 0  0 /100 WBC Final    Gran% 03/02/2020 62.7  38.0 - 73.0 % Final    Lymph% 03/02/2020 22.6  18.0 - 48.0 % Final    Mono% 03/02/2020 9.6  4.0 - 15.0 % Final    Eosinophil% 03/02/2020 3.9  0.0 - 8.0 % Final    Basophil% 03/02/2020 0.6  0.0 - 1.9 % Final    Differential Method 03/02/2020 Automated   Final    DRVVT, Lupus Anticoagulant 03/02/2020 Negative  Negative Final       ASSESSMENT/PLAN:  Problem List Items Addressed This Visit        Pulmonary    Pulmonary nodule    Overview     - 8/19/19 follow-up CT chest 1cm nodule RUL similar to 4/13/19  - followed by Dr. Gaudencio Park Pulmonary with plan to repeat in 11/2019         Current Assessment & Plan     - CT ordered by Dr. Park and reminded patient to have done         COPD (chronic obstructive pulmonary disease)     Overview     - 5/27/2019 PFT: moderate obstruction  - evaluated by Dr. Park Pulmonary  - asymptomatic            Cardiac/Vascular    Mixed hyperlipidemia - Primary    Current Assessment & Plan     Lab Results   Component Value Date    LDLCALC 75.8 03/02/2020     - well controlled  - continue current medications         Relevant Medications    rosuvastatin (CRESTOR) 10 MG tablet    Mitral valve prolapse    Overview     - followed by Cardiology  - asymptomatic            Hematology    Lupus anticoagulant syndrome    Current Assessment & Plan     - without h/o DVT/PE  - evaluated by Rheumatology Dr. Stafford   - lupus anticoagulant testing negative and reports Rheumatology recommends to monitor yearly            GI    Chronic idiopathic constipation    Overview     - seen by GI Dr. Angel  - Miralax 17 gram daily and MOM PRN         Esophageal motility disorder    Overview     - evaluated by Dr. Lakisha Angel  - 2/2020-3/2020 GES normal. Dysmotility noted on Esophogram and manometry recommend             Orthopedic    Poliomyelitis osteopathy of multiple sites    Current Assessment & Plan     - stable and no acute issues  - ambulating well and pain controlled         Relevant Medications    baclofen (LIORESAL) 20 MG tablet    Localized osteoporosis without current pathological fracture    Overview     - last Dexa 3/20/18: lumbar normal (0.6), osteopenia left femoral neck (-1.9) and osteoporosis right femoral neck (-3.9)  - Reclast 5 mg started by Rheumatology (2018) and transferred to PCP  - last infusion 2/25/2019 and repeat due  - okay to continue though little improvement noted on 1148-3947 Dexa  - poor weight bearing on right leg 2/2 h/o polio which may affect bone density  - Dexa due this year (2020)  - fall prevention  - weight bearing exercises like walking, squats, stair and jogging if able  - over the counter vitamin D3 8452-3253 units daily  - dietary calcium 1200 mg per day with diet or supplements            Relevant Medications    acetaminophen tablet 500 mg    zoledronic acid-mannitol & water 5 mg/100 mL infusion 5 mg (Start on 3/6/2020  8:00 AM)    sodium chloride 0.9% flush 10 mL    heparin, porcine (PF) 100 unit/mL injection flush 500 Units    Other Relevant Orders    DXA Bone Density Spine And Hip    Saline lock IV    Nursing communication    Muscle spasm    Current Assessment & Plan     - 2/2 h/o polio  - continue Baclofen 20 mg BID         Relevant Medications    baclofen (LIORESAL) 20 MG tablet      Other Visit Diagnoses     Encounter for screening mammogram for breast cancer        Relevant Orders    Mammo Digital Screening Bilat w/ Cosme          ORDERS:   Orders Placed This Encounter    Mammo Digital Screening Bilat w/ Cosme    DXA Bone Density Spine And Hip    Saline lock IV    Nursing communication    rosuvastatin (CRESTOR) 10 MG tablet    baclofen (LIORESAL) 20 MG tablet    acetaminophen tablet 500 mg    zoledronic acid-mannitol & water 5 mg/100 mL infusion 5 mg    sodium chloride 0.9% flush 10 mL    heparin, porcine (PF) 100 unit/mL injection flush 500 Units     Vaccines recommended: Shingles and Tdap and pt declined    Follow-up 3 months or sooner if any concerns.    Dr. Almaz Tavares D.O.   Family Medicine

## 2020-03-05 NOTE — ASSESSMENT & PLAN NOTE
Lab Results   Component Value Date    LDLCALC 75.8 03/02/2020     - well controlled  - continue current medications

## 2020-03-06 ENCOUNTER — TELEPHONE (OUTPATIENT)
Dept: FAMILY MEDICINE | Facility: CLINIC | Age: 65
End: 2020-03-06

## 2020-03-06 ENCOUNTER — OFFICE VISIT (OUTPATIENT)
Dept: FAMILY MEDICINE | Facility: CLINIC | Age: 65
End: 2020-03-06
Payer: MEDICARE

## 2020-03-06 ENCOUNTER — TELEPHONE (OUTPATIENT)
Dept: INTERNAL MEDICINE | Facility: CLINIC | Age: 65
End: 2020-03-06

## 2020-03-06 VITALS
RESPIRATION RATE: 18 BRPM | HEIGHT: 61 IN | DIASTOLIC BLOOD PRESSURE: 78 MMHG | WEIGHT: 123.63 LBS | OXYGEN SATURATION: 98 % | SYSTOLIC BLOOD PRESSURE: 130 MMHG | HEART RATE: 82 BPM | TEMPERATURE: 99 F | BODY MASS INDEX: 23.34 KG/M2

## 2020-03-06 DIAGNOSIS — M89.69 POLIOMYELITIS OSTEOPATHY OF MULTIPLE SITES: ICD-10-CM

## 2020-03-06 DIAGNOSIS — D68.62 LUPUS ANTICOAGULANT SYNDROME: ICD-10-CM

## 2020-03-06 DIAGNOSIS — R91.1 PULMONARY NODULE: ICD-10-CM

## 2020-03-06 DIAGNOSIS — K22.4 ESOPHAGEAL MOTILITY DISORDER: ICD-10-CM

## 2020-03-06 DIAGNOSIS — E78.2 MIXED HYPERLIPIDEMIA: Primary | ICD-10-CM

## 2020-03-06 DIAGNOSIS — Z12.31 ENCOUNTER FOR SCREENING MAMMOGRAM FOR BREAST CANCER: ICD-10-CM

## 2020-03-06 DIAGNOSIS — M81.6 LOCALIZED OSTEOPOROSIS WITHOUT CURRENT PATHOLOGICAL FRACTURE: ICD-10-CM

## 2020-03-06 DIAGNOSIS — B91 POLIOMYELITIS OSTEOPATHY OF MULTIPLE SITES: ICD-10-CM

## 2020-03-06 DIAGNOSIS — M62.838 MUSCLE SPASM: ICD-10-CM

## 2020-03-06 DIAGNOSIS — I34.1 MITRAL VALVE PROLAPSE: ICD-10-CM

## 2020-03-06 DIAGNOSIS — K59.04 CHRONIC IDIOPATHIC CONSTIPATION: ICD-10-CM

## 2020-03-06 DIAGNOSIS — J44.9 CHRONIC OBSTRUCTIVE PULMONARY DISEASE, UNSPECIFIED COPD TYPE: ICD-10-CM

## 2020-03-06 PROCEDURE — 3008F PR BODY MASS INDEX (BMI) DOCUMENTED: ICD-10-PCS | Mod: HCNC,CPTII,S$GLB, | Performed by: FAMILY MEDICINE

## 2020-03-06 PROCEDURE — 99214 OFFICE O/P EST MOD 30 MIN: CPT | Mod: HCNC,S$GLB,, | Performed by: FAMILY MEDICINE

## 2020-03-06 PROCEDURE — 99999 PR PBB SHADOW E&M-EST. PATIENT-LVL V: CPT | Mod: PBBFAC,HCNC,, | Performed by: FAMILY MEDICINE

## 2020-03-06 PROCEDURE — 99499 RISK ADDL DX/OHS AUDIT: ICD-10-PCS | Mod: S$GLB,,, | Performed by: FAMILY MEDICINE

## 2020-03-06 PROCEDURE — 99999 PR PBB SHADOW E&M-EST. PATIENT-LVL V: ICD-10-PCS | Mod: PBBFAC,HCNC,, | Performed by: FAMILY MEDICINE

## 2020-03-06 PROCEDURE — 3008F BODY MASS INDEX DOCD: CPT | Mod: HCNC,CPTII,S$GLB, | Performed by: FAMILY MEDICINE

## 2020-03-06 PROCEDURE — 99214 PR OFFICE/OUTPT VISIT, EST, LEVL IV, 30-39 MIN: ICD-10-PCS | Mod: HCNC,S$GLB,, | Performed by: FAMILY MEDICINE

## 2020-03-06 PROCEDURE — 99499 UNLISTED E&M SERVICE: CPT | Mod: S$GLB,,, | Performed by: FAMILY MEDICINE

## 2020-03-06 RX ORDER — BACLOFEN 20 MG/1
20 TABLET ORAL 3 TIMES DAILY
Qty: 270 TABLET | Refills: 1 | Status: SHIPPED | OUTPATIENT
Start: 2020-03-06 | End: 2020-07-01 | Stop reason: SDUPTHER

## 2020-03-06 RX ORDER — SODIUM CHLORIDE 0.9 % (FLUSH) 0.9 %
10 SYRINGE (ML) INJECTION
Status: DISCONTINUED | OUTPATIENT
Start: 2020-03-06 | End: 2022-08-01

## 2020-03-06 RX ORDER — ZOLEDRONIC ACID 5 MG/100ML
5 INJECTION, SOLUTION INTRAVENOUS
Status: DISCONTINUED | OUTPATIENT
Start: 2020-03-06 | End: 2022-08-01

## 2020-03-06 RX ORDER — HEPARIN 100 UNIT/ML
500 SYRINGE INTRAVENOUS
Status: CANCELLED | OUTPATIENT
Start: 2020-03-06

## 2020-03-06 RX ORDER — ACETAMINOPHEN 500 MG
500 TABLET ORAL
Status: CANCELLED | OUTPATIENT
Start: 2020-03-06

## 2020-03-06 RX ORDER — SODIUM CHLORIDE 0.9 % (FLUSH) 0.9 %
10 SYRINGE (ML) INJECTION
Status: CANCELLED | OUTPATIENT
Start: 2020-03-06

## 2020-03-06 RX ORDER — ACETAMINOPHEN 500 MG
500 TABLET ORAL
Status: DISCONTINUED | OUTPATIENT
Start: 2020-03-06 | End: 2022-08-01

## 2020-03-06 RX ORDER — HEPARIN 100 UNIT/ML
500 SYRINGE INTRAVENOUS
Status: DISCONTINUED | OUTPATIENT
Start: 2020-03-06 | End: 2022-08-01

## 2020-03-06 RX ORDER — ZOLEDRONIC ACID 5 MG/100ML
5 INJECTION, SOLUTION INTRAVENOUS
Status: CANCELLED | OUTPATIENT
Start: 2020-03-06

## 2020-03-06 RX ORDER — ROSUVASTATIN CALCIUM 10 MG/1
10 TABLET, COATED ORAL DAILY
Qty: 90 TABLET | Refills: 3 | Status: SHIPPED | OUTPATIENT
Start: 2020-03-06 | End: 2020-12-10 | Stop reason: SDUPTHER

## 2020-03-06 NOTE — TELEPHONE ENCOUNTER
----- Message from Almaz Tavares DO sent at 3/6/2020  7:46 AM CST -----  Please call infusion center. Pt's reclast reorderd and just want to make sure correct and they will call pt to schedule

## 2020-03-06 NOTE — TELEPHONE ENCOUNTER
----- Message from Abiola Scott RN sent at 3/6/2020  3:26 PM CST -----  Hi Dr. Tavares. The therapy plan that I saw for reclast has  and no longer has a referral attached to it. You will have to place a new therapy plan, let me know if you need help doing this.

## 2020-03-06 NOTE — ASSESSMENT & PLAN NOTE
- without h/o DVT/PE  - evaluated by Rheumatology Dr. Stafford   - lupus anticoagulant testing negative and reports Rheumatology recommends to monitor yearly

## 2020-03-12 ENCOUNTER — TELEPHONE (OUTPATIENT)
Dept: FAMILY MEDICINE | Facility: CLINIC | Age: 65
End: 2020-03-12

## 2020-03-12 NOTE — TELEPHONE ENCOUNTER
----- Message from Claudia Arcos MD sent at 3/12/2020 11:54 AM CDT -----  The mammogram was read as normal. Please repeat study in 1 year.

## 2020-03-17 ENCOUNTER — TELEPHONE (OUTPATIENT)
Dept: FAMILY MEDICINE | Facility: CLINIC | Age: 65
End: 2020-03-17

## 2020-03-17 NOTE — TELEPHONE ENCOUNTER
----- Message from Nkechi Lind sent at 3/17/2020  9:49 AM CDT -----  Contact: [phone]/self  Type:  Patient Returning Call    Who Called: Patient  Who Left Message for Patient: simran  Does the patient know what this is regarding?: bone density results  Would the patient rather a call back or a response via [facility]? Call back  Best Call Back Number: [phone]  Additional Information: n/a

## 2020-03-17 NOTE — TELEPHONE ENCOUNTER
----- Message from Almaz Tavares DO sent at 3/17/2020  6:32 AM CDT -----  Please call patient bone density still shows osteoporosis hips but it seems to have worsened despite her yearly Reclast infusion. Please make sure that she is scheduled for her next reclast but I also recommend that she see her Rheumatologist (who originally started her on the medication) to see if there is anything else she needs to do. Also recommend that she take over the counter vitamin D 3 1000 units daily and eat or take over the counter supplement of calcium 1200 mg daily.

## 2020-03-18 ENCOUNTER — TELEPHONE (OUTPATIENT)
Dept: FAMILY MEDICINE | Facility: CLINIC | Age: 65
End: 2020-03-18

## 2020-03-18 NOTE — TELEPHONE ENCOUNTER
Notified pt of her DEXA results and the Vit D3 and Calcium recommendations. She refuses to go to Rheumatology and she is scheduled for reclast on 3/27. Just FYI.

## 2020-04-03 ENCOUNTER — TELEPHONE (OUTPATIENT)
Dept: GASTROENTEROLOGY | Facility: CLINIC | Age: 65
End: 2020-04-03

## 2020-04-03 NOTE — TELEPHONE ENCOUNTER
Explained results to patient. Sent a reminder to myself in 1 month for manometry to see if patient wants to have this test done.

## 2020-04-03 NOTE — TELEPHONE ENCOUNTER
----- Message from Lakisha Angel MD sent at 3/3/2020 12:35 PM CST -----  Poor motility seen on esophagram.  This can be further worked up with manometry, which can be done at [facility] or [facility].  Please let me know if she is interested in doing this test and I will order it.

## 2020-05-05 ENCOUNTER — TELEPHONE (OUTPATIENT)
Dept: GASTROENTEROLOGY | Facility: CLINIC | Age: 65
End: 2020-05-05

## 2020-05-05 NOTE — TELEPHONE ENCOUNTER
Spoke to patient about scheduling manometry. Patient does not want to schedule at this time due to COVID 19.

## 2020-05-05 NOTE — TELEPHONE ENCOUNTER
----- Message from Tiffanie Apple MA sent at 4/3/2020  4:39 PM CDT -----   This can be further worked up with manometry, which can be done at [facility] or [facility].  Please let me know if she is interested in doing this test and I will order it.

## 2020-05-28 ENCOUNTER — TELEPHONE (OUTPATIENT)
Dept: FAMILY MEDICINE | Facility: CLINIC | Age: 65
End: 2020-05-28

## 2020-05-28 NOTE — TELEPHONE ENCOUNTER
----- Message from Kierra Hidalgo sent at 5/28/2020  9:29 AM CDT -----  Contact: [phone]/Self   Patient is requesting to speak with nurse regarding rescheduling her appt on 06-01-20. Please advise.

## 2020-06-16 ENCOUNTER — TELEPHONE (OUTPATIENT)
Dept: GASTROENTEROLOGY | Facility: CLINIC | Age: 65
End: 2020-06-16

## 2020-06-17 ENCOUNTER — PATIENT OUTREACH (OUTPATIENT)
Dept: ADMINISTRATIVE | Facility: HOSPITAL | Age: 65
End: 2020-06-17

## 2020-06-29 ENCOUNTER — HOSPITAL ENCOUNTER (OUTPATIENT)
Dept: RADIOLOGY | Facility: HOSPITAL | Age: 65
Discharge: HOME | End: 2020-06-29
Attending: INTERNAL MEDICINE
Payer: MEDICARE

## 2020-06-29 DIAGNOSIS — J44.1 CHRONIC OBSTRUCTIVE PULMONARY DISEASE WITH (ACUTE) EXACERBATION: ICD-10-CM

## 2020-06-29 DIAGNOSIS — R05.9 COUGH: ICD-10-CM

## 2020-06-29 PROCEDURE — 71250 CT CHEST WITHOUT CONTRAST: ICD-10-PCS | Mod: 26,HCNC,, | Performed by: RADIOLOGY

## 2020-06-29 PROCEDURE — 71250 CT THORAX DX C-: CPT | Mod: 26,HCNC,, | Performed by: RADIOLOGY

## 2020-06-29 PROCEDURE — 71250 CT THORAX DX C-: CPT | Mod: TC,HCNC

## 2020-06-30 NOTE — PROGRESS NOTES
FAMILY MEDICINE    Patient Active Problem List   Diagnosis    Mixed hyperlipidemia    Poliomyelitis osteopathy of multiple sites    GERD (gastroesophageal reflux disease)    Mitral valve prolapse    Localized osteoporosis without current pathological fracture    Lupus anticoagulant syndrome    Muscle spasm    History of hysterectomy    Pulmonary nodule    Chronic idiopathic constipation    History of acute pancreatitis    Esophageal motility disorder    COPD (chronic obstructive pulmonary disease)       CC:   Chief Complaint   Patient presents with    Follow-up    Panic Attack       SUBJECTIVE:  Julia Fernández   is a 65 y.o. female  - with hyperlipidemia, anxiety, osteoporosis (Rheumatologist Dr. Alana Stafford), lupus anticoagulant with h/o polio with right leg weakness and recurrent muscle spasms, pulmonary nodules, mitral valve prolapse, COPD, pulmonary nodules, esophageal dysmotility, chronic constipation and hypertriglyceridemia presents for routine follow-up.    Rheumatology Dr. Alana Stafford  Pulmonary Dr. Gaudencio Park  Cardiology Dr. Ernandez   GI: Dr. Lakisha Angel  Pain Management: Dr. Toñito Torre    She notes increased panic attacks when she has to wear a facial mask which is now required during COvid-19 pandemic. She becomes anxious, tearful and has difficulty breathing. She is using the mask to shop and her medical visits.      1. Post-polio syndrome with muscle spasm     Symptoms: spasms of legs and hands     Prior treatments: Soma (cause fatigue and daytime sleepiness)     Current medications:   baclofen (LIORESAL) 20 MG tablet, TAKE 1 TABLET THREE TIMES DAILY, Disp: 270 tablet, Rfl: 0  hydrocodone-acetaminophen 10-325mg (NORCO)  mg Tab, Take by mouth 2 (two) times daily as needed., Disp: , Rfl: (followed by pain management)     Mobility: doing well with out walker or cane. +right ankle brace to prevent foot drop     2. Hyperlipidemia     Current treatment:  gemfibrozil (LOPID) 600  MG tablet, Take 1 tablet (600 mg total) by mouth 2 (two) times daily before meals., Disp: 180 tablet, Rfl: 3  rosuvastatin (CRESTOR) 10 MG tablet, TAKE 1 TABLET EVERY DAY, Disp: 90 tablet, Rfl: 3     Side effects from current treatment: denies     Lab Results       Component                Value               Date                       CHOL                     142                 03/02/2020                 CHOL                     130                 04/13/2019                 CHOL                     166                 02/06/2019            Lab Results       Component                Value               Date                       HDL                      44                  03/02/2020                 HDL                      47                  04/13/2019                 HDL                      65                  02/06/2019            Lab Results       Component                Value               Date                       LDLCALC                  75.8                03/02/2020                 LDLCALC                  69.6                04/13/2019                 LDLCALC                  89.6                02/06/2019            Lab Results       Component                Value               Date                       TRIG                     111                 03/02/2020                 TRIG                     67                  04/13/2019                 TRIG                     57                  02/06/2019            Lab Results       Component                Value               Date                       CHOLHDL                  31.0                03/02/2020                 CHOLHDL                  36.2                04/13/2019                 CHOLHDL                  39.2                02/06/2019                  ROS: Review of Systems   Constitutional: Negative.    HENT: Negative.    Eyes: Negative.    Respiratory: Negative.    Cardiovascular: Negative.    Gastrointestinal: Negative.    Endocrine:  "Negative.    Genitourinary: Negative.    Musculoskeletal: Positive for arthralgias, gait problem and myalgias.        Otherwise negative   Skin: Negative.    Allergic/Immunologic: Negative.    Neurological: Positive for weakness.        Otherwise negative   Hematological: Negative.    Psychiatric/Behavioral: The patient is nervous/anxious.         Otherwise negative       Past Medical History:   Diagnosis Date    Anxiety     Arthritis     Chronic obstructive pulmonary disease 3/6/2020    Clotting disorder     Lupus anticoagulant    GERD (gastroesophageal reflux disease)     High cholesterol     High triglycerides     Lupus     "Lupus anticoagulant"    Mitral valve prolapse     Osteoporosis     Pancreatitis     Polio     9 month old with right leg discrepency and weaknes    Scoliosis        Past Surgical History:   Procedure Laterality Date    ADENOIDECTOMY      APPENDECTOMY       SECTION      x1    CHOLECYSTECTOMY      COLONOSCOPY      ESOPHAGOGASTRODUODENOSCOPY N/A 2020    Procedure: EGD (ESOPHAGOGASTRODUODENOSCOPY);  Surgeon: Lakisha Angel MD;  Location: [facility];  Service: Endoscopy;  Laterality: N/A;    EYE SURGERY Right     lower eyelid ("growth removed")    HYSTERECTOMY      endometriosis    TONSILLECTOMY      TUBAL LIGATION         Family History   Problem Relation Age of Onset    Arthritis Mother     Diabetes Mother     Hearing loss Mother     Vision loss Mother     COPD Mother     Depression Mother     Vision loss Father     Cancer Father         Pituitary tumor    Heart disease Father         CABG    Diabetes Sister     Hyperlipidemia Sister     Hypertension Sister     Hearing loss Sister     Drug abuse Brother     Alcohol abuse Brother     Early death Brother     Hypertension Brother     No Known Problems Son        Social History     Tobacco Use    Smoking status: Former Smoker     Packs/day: 1.00     Years: 29.00     Pack years: 29.00     " Start date: 1976     Quit date: 2005     Years since quitting: 15.5    Smokeless tobacco: Never Used   Substance Use Topics    Alcohol use: No    Drug use: No       Social History     Social History Narrative    . 1 son. 3 grandchildren (2 girls and 1 boy). Disability       ALLERGIES:   Review of patient's allergies indicates:   Allergen Reactions    Gabapentin Other (See Comments)     dizzy    Prevnar 13 [pneumoc 13-bethany conj-dip cr(pf)] Swelling and Rash       MEDS:   Current Outpatient Medications:     aspirin (ECOTRIN) 81 MG EC tablet, Take 81 mg by mouth once daily., Disp: , Rfl:     baclofen (LIORESAL) 20 MG tablet, Take 1 tablet (20 mg total) by mouth 3 (three) times daily., Disp: 270 tablet, Rfl: 1    calcium carbonate-vitamin D3 (CALTRATE 600 + D) 600 mg (1,500 mg)-800 unit Chew, Take by mouth 2 (two) times daily., Disp: , Rfl:     gemfibrozil (LOPID) 600 MG tablet, TAKE 1 TABLET TWICE DAILY BEFORE MEALS, Disp: 180 tablet, Rfl: 3    hydrocodone-acetaminophen 10-325mg (NORCO)  mg Tab, Take by mouth 2 (two) times daily as needed., Disp: , Rfl:     melatonin 3 mg Tab, Take 6 mg by mouth., Disp: , Rfl:     pantoprazole (PROTONIX) 40 MG tablet, TAKE 1 TABLET EVERY DAY, Disp: 90 tablet, Rfl: 1    polyethylene glycol (GLYCOLAX) 17 gram PwPk, Take by mouth., Disp: , Rfl:     rosuvastatin (CRESTOR) 10 MG tablet, Take 1 tablet (10 mg total) by mouth once daily., Disp: 90 tablet, Rfl: 3    propranoloL (INDERAL) 20 MG tablet, Take 1 tablet (20 mg total) by mouth daily as needed (panic attacks)., Disp: 30 tablet, Rfl: 0    Current Facility-Administered Medications:     acetaminophen tablet 500 mg, 500 mg, Oral, PRN, Almaz Tavares, DO    heparin, porcine (PF) 100 unit/mL injection flush 500 Units, 500 Units, Intravenous, PRN, Almaz Tavares, DO    sodium chloride 0.9% flush 10 mL, 10 mL, Intravenous, PRN, Almaz Tavares, DO    zoledronic acid-mannitol & water 5 mg/100 mL infusion 5 mg, 5 mg,  "Intravenous, 1 time in Clinic/HOD, Almaz TavaresDO    OBJECTIVE:   Vitals:    07/01/20 0948   BP: 136/84   BP Location: Left arm   Patient Position: Sitting   BP Method: Large (Manual)   Pulse: 89   Temp: 97.7 °F (36.5 °C)   SpO2: 97%   Weight: 54.9 kg (121 lb)   Height: 5' 1" (1.549 m)     Body mass index is 22.86 kg/m².    Physical Exam  Constitutional:       General: She is not in acute distress.  Neck:      Musculoskeletal: Neck supple.   Cardiovascular:      Rate and Rhythm: Normal rate and regular rhythm.      Pulses: Normal pulses.      Heart sounds: Normal heart sounds. No murmur. No friction rub. No gallop.    Pulmonary:      Effort: Pulmonary effort is normal.      Breath sounds: Normal breath sounds. No wheezing, rhonchi or rales.   Abdominal:      General: Abdomen is flat. Bowel sounds are normal.      Palpations: Abdomen is soft.   Musculoskeletal:      Right lower leg: No edema.      Left lower leg: No edema.   Neurological:      Mental Status: She is alert.           PERTINENT RESULTS:   No visits with results within 1 Week(s) from this visit.   Latest known visit with results is:   Lab Visit on 03/02/2020   Component Date Value Ref Range Status    Sodium 03/02/2020 141  136 - 145 mmol/L Final    Potassium 03/02/2020 4.1  3.5 - 5.1 mmol/L Final    Chloride 03/02/2020 106  95 - 110 mmol/L Final    CO2 03/02/2020 26  23 - 29 mmol/L Final    Glucose 03/02/2020 98  70 - 110 mg/dL Final    BUN, Bld 03/02/2020 16  7 - 17 mg/dL Final    Creatinine 03/02/2020 0.52  0.50 - 1.40 mg/dL Final    Calcium 03/02/2020 10.1  8.7 - 10.5 mg/dL Final    Total Protein 03/02/2020 7.6  6.0 - 8.4 g/dL Final    Albumin 03/02/2020 4.8  3.5 - 5.2 g/dL Final    Total Bilirubin 03/02/2020 0.5  0.1 - 1.0 mg/dL Final    Comment: For infants and newborns, interpretation of results should be based  on gestational age, weight and in agreement with clinical  observations.  Premature Infant recommended reference ranges:  Up " to 24 hours.............<8.0 mg/dL  Up to 48 hours............<12.0 mg/dL  3-5 days..................<15.0 mg/dL  6-29 days.................<15.0 mg/dL      Alkaline Phosphatase 03/02/2020 68  38 - 126 U/L Final    AST 03/02/2020 31  15 - 46 U/L Final    ALT 03/02/2020 26  10 - 44 U/L Final    Anion Gap 03/02/2020 9  8 - 16 mmol/L Final    eGFR if African American 03/02/2020 >60.0  >60 mL/min/1.73 m^2 Final    eGFR if non African American 03/02/2020 >60.0  >60 mL/min/1.73 m^2 Final    Comment: Calculation used to obtain the estimated glomerular filtration  rate (eGFR) is the CKD-EPI equation.       Cholesterol 03/02/2020 142  120 - 199 mg/dL Final    Comment: The National Cholesterol Education Program (NCEP) has set the  following guidelines (reference ranges) for Cholesterol:  Optimal.....................<200 mg/dL  Borderline High.............200-239 mg/dL  High........................> or = 240 mg/dL      Triglycerides 03/02/2020 111  30 - 150 mg/dL Final    Comment: The National Cholesterol Education Program (NCEP) has set the  following guidelines (reference values) for triglycerides:  Normal......................<150 mg/dL  Borderline High.............150-199 mg/dL  High........................200-499 mg/dL      HDL 03/02/2020 44  40 - 75 mg/dL Final    Comment: The National Cholesterol Education Program (NCEP) has set the  following guidelines (reference values) for HDL Cholesterol:  Low...............<40 mg/dL  Optimal...........>60 mg/dL      LDL Cholesterol 03/02/2020 75.8  63.0 - 159.0 mg/dL Final    Comment: The National Cholesterol Education Program (NCEP) has set the  following guidelines (reference values) for LDL Cholesterol:  Optimal.......................<130 mg/dL  Borderline High...............130-159 mg/dL  High..........................160-189 mg/dL  Very High.....................>190 mg/dL      Hdl/Cholesterol Ratio 03/02/2020 31.0  20.0 - 50.0 % Final    Total Cholesterol/HDL  Ratio 03/02/2020 3.2  2.0 - 5.0 Final    Non-HDL Cholesterol 03/02/2020 98  mg/dL Final    Comment: Risk category and Non-HDL cholesterol goals:  Coronary heart disease (CHD)or equivalent (10-year risk of CHD >20%):  Non-HDL cholesterol goal     <130 mg/dL  Two or more CHD risk factors and 10-year risk of CHD <= 20%:  Non-HDL cholesterol goal     <160 mg/dL  0 to 1 CHD risk factor:  Non-HDL cholesterol goal     <190 mg/dL      WBC 03/02/2020 5.09  3.90 - 12.70 K/uL Final    RBC 03/02/2020 4.70  4.00 - 5.40 M/uL Final    Hemoglobin 03/02/2020 13.8  12.0 - 16.0 g/dL Final    Hematocrit 03/02/2020 43.0  37.0 - 48.5 % Final    Mean Corpuscular Volume 03/02/2020 92  82 - 98 fL Final    Mean Corpuscular Hemoglobin 03/02/2020 29.4  27.0 - 31.0 pg Final    Mean Corpuscular Hemoglobin Conc 03/02/2020 32.1  32.0 - 36.0 g/dL Final    RDW 03/02/2020 12.1  11.5 - 14.5 % Final    Platelets 03/02/2020 231  150 - 350 K/uL Final    MPV 03/02/2020 11.0  9.2 - 12.9 fL Final    Immature Granulocytes 03/02/2020 0.6* 0.0 - 0.5 % Final    Gran # (ANC) 03/02/2020 3.2  1.8 - 7.7 K/uL Final    Immature Grans (Abs) 03/02/2020 0.03  0.00 - 0.04 K/uL Final    Comment: Mild elevation in immature granulocytes is non specific and   can be seen in a variety of conditions including stress response,   acute inflammation, trauma and pregnancy. Correlation with other   laboratory and clinical findings is essential.      Lymph # 03/02/2020 1.2  1.0 - 4.8 K/uL Final    Mono # 03/02/2020 0.5  0.3 - 1.0 K/uL Final    Eos # 03/02/2020 0.2  0.0 - 0.5 K/uL Final    Baso # 03/02/2020 0.03  0.00 - 0.20 K/uL Final    nRBC 03/02/2020 0  0 /100 WBC Final    Gran% 03/02/2020 62.7  38.0 - 73.0 % Final    Lymph% 03/02/2020 22.6  18.0 - 48.0 % Final    Mono% 03/02/2020 9.6  4.0 - 15.0 % Final    Eosinophil% 03/02/2020 3.9  0.0 - 8.0 % Final    Basophil% 03/02/2020 0.6  0.0 - 1.9 % Final    Differential Method 03/02/2020 Automated   Final     NICOLLE, Lupus Anticoagulant 03/02/2020 Negative  Negative Final       ASSESSMENT/PLAN:  Problem List Items Addressed This Visit        Pulmonary    COPD (chronic obstructive pulmonary disease)    Overview     - 5/27/2019 PFT: moderate obstruction  - evaluated by Dr. Park Pulmonary  - asymptomatic            Cardiac/Vascular    Mixed hyperlipidemia    Current Assessment & Plan     Lab Results   Component Value Date    LDLCALC 75.8 03/02/2020     - well controlled  - continue current medications            Orthopedic    Localized osteoporosis without current pathological fracture    Overview     - last Dexa 3/20/18: lumbar normal (0.6), osteopenia left femoral neck (-1.9) and osteoporosis right femoral neck (-3.9)  - Reclast 5 mg started by Rheumatology (2018) and transferred to PCP  - last infusion 3/11/2020  - okay to continue though little improvement noted on 9563-0375 Dexa  - poor weight bearing on right leg 2/2 h/o polio which may affect bone density  - Dexa due this year (2020)  - fall prevention  - weight bearing exercises like walking, squats, stair and jogging if able  - over the counter vitamin D3 9600-4114 units daily  - dietary calcium 1200 mg per day with diet or supplements           Relevant Orders    DXA Bone Density Spine And Hip    Muscle spasm - Primary    Current Assessment & Plan     - 2/2 h/o polio  - continue Baclofen 20 mg TID         Relevant Medications    baclofen (LIORESAL) 20 MG tablet    Poliomyelitis osteopathy of multiple sites    Current Assessment & Plan     - 2/2 h/o polio  - continue Baclofen 20 mg TID         Relevant Medications    baclofen (LIORESAL) 20 MG tablet      Other Visit Diagnoses     Panic attack        Relevant Medications    propranoloL (INDERAL) 20 MG tablet          ORDERS:   Orders Placed This Encounter    DXA Bone Density Spine And Hip    baclofen (LIORESAL) 20 MG tablet    propranoloL (INDERAL) 20 MG tablet     Vaccines recommended: Shingles and Tdap and pt  declined and reports allergy to Pneumonia vaccine    Follow-up 3-6 months or sooner if any concerns.    Dr. Almaz Tavares D.O.   [facility]

## 2020-07-01 ENCOUNTER — OFFICE VISIT (OUTPATIENT)
Dept: FAMILY MEDICINE | Facility: CLINIC | Age: 65
End: 2020-07-01
Payer: MEDICARE

## 2020-07-01 VITALS
WEIGHT: 121 LBS | HEIGHT: 61 IN | SYSTOLIC BLOOD PRESSURE: 136 MMHG | TEMPERATURE: 98 F | OXYGEN SATURATION: 97 % | HEART RATE: 89 BPM | BODY MASS INDEX: 22.84 KG/M2 | DIASTOLIC BLOOD PRESSURE: 84 MMHG

## 2020-07-01 DIAGNOSIS — J44.9 CHRONIC OBSTRUCTIVE PULMONARY DISEASE, UNSPECIFIED COPD TYPE: ICD-10-CM

## 2020-07-01 DIAGNOSIS — B91 POLIOMYELITIS OSTEOPATHY OF MULTIPLE SITES: ICD-10-CM

## 2020-07-01 DIAGNOSIS — M81.6 LOCALIZED OSTEOPOROSIS WITHOUT CURRENT PATHOLOGICAL FRACTURE: ICD-10-CM

## 2020-07-01 DIAGNOSIS — F41.0 PANIC ATTACK: ICD-10-CM

## 2020-07-01 DIAGNOSIS — M62.838 MUSCLE SPASM: Primary | ICD-10-CM

## 2020-07-01 DIAGNOSIS — E78.2 MIXED HYPERLIPIDEMIA: ICD-10-CM

## 2020-07-01 DIAGNOSIS — M89.69 POLIOMYELITIS OSTEOPATHY OF MULTIPLE SITES: ICD-10-CM

## 2020-07-01 PROCEDURE — 99499 RISK ADDL DX/OHS AUDIT: ICD-10-PCS | Mod: HCNC,S$GLB,, | Performed by: FAMILY MEDICINE

## 2020-07-01 PROCEDURE — 3008F BODY MASS INDEX DOCD: CPT | Mod: HCNC,CPTII,S$GLB, | Performed by: FAMILY MEDICINE

## 2020-07-01 PROCEDURE — 99999 PR PBB SHADOW E&M-EST. PATIENT-LVL IV: ICD-10-PCS | Mod: PBBFAC,HCNC,, | Performed by: FAMILY MEDICINE

## 2020-07-01 PROCEDURE — 99499 UNLISTED E&M SERVICE: CPT | Mod: HCNC,S$GLB,, | Performed by: FAMILY MEDICINE

## 2020-07-01 PROCEDURE — 99214 PR OFFICE/OUTPT VISIT, EST, LEVL IV, 30-39 MIN: ICD-10-PCS | Mod: HCNC,S$GLB,, | Performed by: FAMILY MEDICINE

## 2020-07-01 PROCEDURE — 3008F PR BODY MASS INDEX (BMI) DOCUMENTED: ICD-10-PCS | Mod: HCNC,CPTII,S$GLB, | Performed by: FAMILY MEDICINE

## 2020-07-01 PROCEDURE — 1101F PR PT FALLS ASSESS DOC 0-1 FALLS W/OUT INJ PAST YR: ICD-10-PCS | Mod: HCNC,CPTII,S$GLB, | Performed by: FAMILY MEDICINE

## 2020-07-01 PROCEDURE — 99214 OFFICE O/P EST MOD 30 MIN: CPT | Mod: HCNC,S$GLB,, | Performed by: FAMILY MEDICINE

## 2020-07-01 PROCEDURE — 99999 PR PBB SHADOW E&M-EST. PATIENT-LVL IV: CPT | Mod: PBBFAC,HCNC,, | Performed by: FAMILY MEDICINE

## 2020-07-01 PROCEDURE — 1101F PT FALLS ASSESS-DOCD LE1/YR: CPT | Mod: HCNC,CPTII,S$GLB, | Performed by: FAMILY MEDICINE

## 2020-07-01 RX ORDER — ZOSTER VACCINE RECOMBINANT, ADJUVANTED 50 MCG/0.5
KIT INTRAMUSCULAR
Qty: 0.5 ML | Refills: 1 | Status: CANCELLED | OUTPATIENT
Start: 2020-07-01

## 2020-07-01 RX ORDER — BACLOFEN 20 MG/1
20 TABLET ORAL 3 TIMES DAILY
Qty: 270 TABLET | Refills: 1 | Status: SHIPPED | OUTPATIENT
Start: 2020-07-01 | End: 2021-01-06

## 2020-07-01 RX ORDER — PROPRANOLOL HYDROCHLORIDE 20 MG/1
20 TABLET ORAL DAILY PRN
Qty: 30 TABLET | Refills: 0 | Status: SHIPPED | OUTPATIENT
Start: 2020-07-01 | End: 2020-07-23

## 2020-07-01 NOTE — Clinical Note
Please call patient. I forgot her bone density is due this year. Recommend have done 1 week before next visit. Ordered and please schedule

## 2020-09-17 ENCOUNTER — TELEPHONE (OUTPATIENT)
Dept: INTERNAL MEDICINE | Facility: CLINIC | Age: 65
End: 2020-09-17

## 2020-12-29 ENCOUNTER — TELEPHONE (OUTPATIENT)
Dept: FAMILY MEDICINE | Facility: CLINIC | Age: 65
End: 2020-12-29

## 2020-12-30 DIAGNOSIS — R91.1 PULMONARY NODULE: Primary | ICD-10-CM

## 2021-01-04 ENCOUNTER — HOSPITAL ENCOUNTER (OUTPATIENT)
Dept: RADIOLOGY | Facility: HOSPITAL | Age: 66
Discharge: HOME | End: 2021-01-04
Attending: INTERNAL MEDICINE
Payer: MEDICARE

## 2021-01-04 DIAGNOSIS — R91.1 PULMONARY NODULE: ICD-10-CM

## 2021-01-04 PROCEDURE — 71250 CT THORAX DX C-: CPT | Mod: TC,HCNC

## 2021-01-04 PROCEDURE — 71250 CT THORAX DX C-: CPT | Mod: 26,HCNC,, | Performed by: RADIOLOGY

## 2021-01-04 PROCEDURE — 71250 CT CHEST WITHOUT CONTRAST: ICD-10-PCS | Mod: 26,HCNC,, | Performed by: RADIOLOGY

## 2021-01-13 PROBLEM — E04.2 MULTIPLE THYROID NODULES: Status: ACTIVE | Noted: 2021-01-13

## 2021-01-13 RX ORDER — ZOLEDRONIC ACID 5 MG/100ML
5 INJECTION, SOLUTION INTRAVENOUS ONCE
Qty: 100 ML | Refills: 0 | Status: CANCELLED | OUTPATIENT
Start: 2021-01-13 | End: 2021-01-13

## 2021-01-14 ENCOUNTER — OFFICE VISIT (OUTPATIENT)
Dept: FAMILY MEDICINE | Facility: CLINIC | Age: 66
End: 2021-01-14
Payer: MEDICARE

## 2021-01-14 VITALS
WEIGHT: 119.19 LBS | RESPIRATION RATE: 18 BRPM | OXYGEN SATURATION: 96 % | HEART RATE: 92 BPM | SYSTOLIC BLOOD PRESSURE: 118 MMHG | DIASTOLIC BLOOD PRESSURE: 70 MMHG | HEIGHT: 61 IN | BODY MASS INDEX: 22.5 KG/M2 | TEMPERATURE: 98 F

## 2021-01-14 DIAGNOSIS — J44.9 CHRONIC OBSTRUCTIVE PULMONARY DISEASE, UNSPECIFIED COPD TYPE: Primary | ICD-10-CM

## 2021-01-14 DIAGNOSIS — Z12.11 SCREEN FOR COLON CANCER: ICD-10-CM

## 2021-01-14 DIAGNOSIS — M62.838 MUSCLE SPASM: ICD-10-CM

## 2021-01-14 DIAGNOSIS — E78.2 MIXED HYPERLIPIDEMIA: ICD-10-CM

## 2021-01-14 DIAGNOSIS — M81.6 LOCALIZED OSTEOPOROSIS WITHOUT CURRENT PATHOLOGICAL FRACTURE: ICD-10-CM

## 2021-01-14 DIAGNOSIS — E04.2 MULTIPLE THYROID NODULES: ICD-10-CM

## 2021-01-14 DIAGNOSIS — D68.62 LUPUS ANTICOAGULANT SYNDROME: ICD-10-CM

## 2021-01-14 DIAGNOSIS — R91.1 PULMONARY NODULE: ICD-10-CM

## 2021-01-14 DIAGNOSIS — M89.69 POLIOMYELITIS OSTEOPATHY OF MULTIPLE SITES: ICD-10-CM

## 2021-01-14 DIAGNOSIS — B91 POLIOMYELITIS OSTEOPATHY OF MULTIPLE SITES: ICD-10-CM

## 2021-01-14 DIAGNOSIS — Z12.31 ENCOUNTER FOR SCREENING MAMMOGRAM FOR BREAST CANCER: ICD-10-CM

## 2021-01-14 PROCEDURE — 1101F PR PT FALLS ASSESS DOC 0-1 FALLS W/OUT INJ PAST YR: ICD-10-PCS | Mod: HCNC,CPTII,S$GLB, | Performed by: FAMILY MEDICINE

## 2021-01-14 PROCEDURE — 3008F BODY MASS INDEX DOCD: CPT | Mod: HCNC,CPTII,S$GLB, | Performed by: FAMILY MEDICINE

## 2021-01-14 PROCEDURE — 99999 PR PBB SHADOW E&M-EST. PATIENT-LVL V: ICD-10-PCS | Mod: PBBFAC,HCNC,, | Performed by: FAMILY MEDICINE

## 2021-01-14 PROCEDURE — 3288F FALL RISK ASSESSMENT DOCD: CPT | Mod: HCNC,CPTII,S$GLB, | Performed by: FAMILY MEDICINE

## 2021-01-14 PROCEDURE — 99999 PR PBB SHADOW E&M-EST. PATIENT-LVL V: CPT | Mod: PBBFAC,HCNC,, | Performed by: FAMILY MEDICINE

## 2021-01-14 PROCEDURE — 1101F PT FALLS ASSESS-DOCD LE1/YR: CPT | Mod: HCNC,CPTII,S$GLB, | Performed by: FAMILY MEDICINE

## 2021-01-14 PROCEDURE — 1126F AMNT PAIN NOTED NONE PRSNT: CPT | Mod: HCNC,S$GLB,, | Performed by: FAMILY MEDICINE

## 2021-01-14 PROCEDURE — 99499 UNLISTED E&M SERVICE: CPT | Mod: S$GLB,,, | Performed by: FAMILY MEDICINE

## 2021-01-14 PROCEDURE — 99499 RISK ADDL DX/OHS AUDIT: ICD-10-PCS | Mod: S$GLB,,, | Performed by: FAMILY MEDICINE

## 2021-01-14 PROCEDURE — 99214 OFFICE O/P EST MOD 30 MIN: CPT | Mod: HCNC,S$GLB,, | Performed by: FAMILY MEDICINE

## 2021-01-14 PROCEDURE — 3288F PR FALLS RISK ASSESSMENT DOCUMENTED: ICD-10-PCS | Mod: HCNC,CPTII,S$GLB, | Performed by: FAMILY MEDICINE

## 2021-01-14 PROCEDURE — 1126F PR PAIN SEVERITY QUANTIFIED, NO PAIN PRESENT: ICD-10-PCS | Mod: HCNC,S$GLB,, | Performed by: FAMILY MEDICINE

## 2021-01-14 PROCEDURE — 99214 PR OFFICE/OUTPT VISIT, EST, LEVL IV, 30-39 MIN: ICD-10-PCS | Mod: HCNC,S$GLB,, | Performed by: FAMILY MEDICINE

## 2021-01-14 PROCEDURE — 3008F PR BODY MASS INDEX (BMI) DOCUMENTED: ICD-10-PCS | Mod: HCNC,CPTII,S$GLB, | Performed by: FAMILY MEDICINE

## 2021-01-26 LAB — FECAL OCCULT BLOOD SCREEN, POC: NEGATIVE

## 2021-02-05 ENCOUNTER — PATIENT OUTREACH (OUTPATIENT)
Dept: ADMINISTRATIVE | Facility: HOSPITAL | Age: 66
End: 2021-02-05

## 2021-02-08 ENCOUNTER — TELEPHONE (OUTPATIENT)
Dept: FAMILY MEDICINE | Facility: CLINIC | Age: 66
End: 2021-02-08

## 2021-03-12 ENCOUNTER — PES CALL (OUTPATIENT)
Dept: ADMINISTRATIVE | Facility: CLINIC | Age: 66
End: 2021-03-12

## 2021-03-23 ENCOUNTER — TELEPHONE (OUTPATIENT)
Dept: FAMILY MEDICINE | Facility: CLINIC | Age: 66
End: 2021-03-23

## 2021-03-23 DIAGNOSIS — Z12.31 ENCOUNTER FOR SCREENING MAMMOGRAM FOR BREAST CANCER: Primary | ICD-10-CM

## 2021-05-06 DIAGNOSIS — K21.9 GASTROESOPHAGEAL REFLUX DISEASE WITHOUT ESOPHAGITIS: ICD-10-CM

## 2021-05-06 RX ORDER — PANTOPRAZOLE SODIUM 40 MG/1
40 TABLET, DELAYED RELEASE ORAL DAILY
Qty: 90 TABLET | Refills: 3 | Status: SHIPPED | OUTPATIENT
Start: 2021-05-06 | End: 2022-02-03

## 2021-06-08 ENCOUNTER — TELEPHONE (OUTPATIENT)
Dept: FAMILY MEDICINE | Facility: CLINIC | Age: 66
End: 2021-06-08

## 2021-06-08 DIAGNOSIS — M81.6 LOCALIZED OSTEOPOROSIS WITHOUT CURRENT PATHOLOGICAL FRACTURE: Primary | ICD-10-CM

## 2021-06-08 RX ORDER — SODIUM CHLORIDE 0.9 % (FLUSH) 0.9 %
10 SYRINGE (ML) INJECTION
Status: DISCONTINUED | OUTPATIENT
Start: 2021-06-08 | End: 2022-08-01

## 2021-06-08 RX ORDER — HEPARIN 100 UNIT/ML
500 SYRINGE INTRAVENOUS
Status: DISCONTINUED | OUTPATIENT
Start: 2021-06-08 | End: 2022-08-01

## 2021-06-08 RX ORDER — ZOLEDRONIC ACID 5 MG/100ML
5 INJECTION, SOLUTION INTRAVENOUS
Status: DISCONTINUED | OUTPATIENT
Start: 2021-06-08 | End: 2022-08-01

## 2021-06-08 RX ORDER — ACETAMINOPHEN 500 MG
500 TABLET ORAL
Status: DISCONTINUED | OUTPATIENT
Start: 2021-06-08 | End: 2022-08-01

## 2021-06-25 ENCOUNTER — PES CALL (OUTPATIENT)
Dept: ADMINISTRATIVE | Facility: CLINIC | Age: 66
End: 2021-06-25

## 2021-07-26 ENCOUNTER — OFFICE VISIT (OUTPATIENT)
Dept: FAMILY MEDICINE | Facility: CLINIC | Age: 66
End: 2021-07-26
Payer: MEDICARE

## 2021-07-26 VITALS
RESPIRATION RATE: 18 BRPM | HEART RATE: 86 BPM | HEIGHT: 61 IN | WEIGHT: 120.63 LBS | SYSTOLIC BLOOD PRESSURE: 110 MMHG | DIASTOLIC BLOOD PRESSURE: 70 MMHG | OXYGEN SATURATION: 98 % | BODY MASS INDEX: 22.78 KG/M2

## 2021-07-26 DIAGNOSIS — F41.0 PANIC ATTACK: ICD-10-CM

## 2021-07-26 DIAGNOSIS — R91.1 PULMONARY NODULE: ICD-10-CM

## 2021-07-26 DIAGNOSIS — N64.4 BREAST PAIN, RIGHT: Primary | ICD-10-CM

## 2021-07-26 DIAGNOSIS — L98.8 SKIN LESION OF BREAST: ICD-10-CM

## 2021-07-26 DIAGNOSIS — J44.9 CHRONIC OBSTRUCTIVE PULMONARY DISEASE, UNSPECIFIED COPD TYPE: ICD-10-CM

## 2021-07-26 DIAGNOSIS — E78.2 MIXED HYPERLIPIDEMIA: ICD-10-CM

## 2021-07-26 DIAGNOSIS — D68.62 LUPUS ANTICOAGULANT SYNDROME: ICD-10-CM

## 2021-07-26 PROCEDURE — 99214 PR OFFICE/OUTPT VISIT, EST, LEVL IV, 30-39 MIN: ICD-10-PCS | Mod: S$GLB,,, | Performed by: FAMILY MEDICINE

## 2021-07-26 PROCEDURE — 1159F PR MEDICATION LIST DOCUMENTED IN MEDICAL RECORD: ICD-10-PCS | Mod: CPTII,S$GLB,, | Performed by: FAMILY MEDICINE

## 2021-07-26 PROCEDURE — 1159F MED LIST DOCD IN RCRD: CPT | Mod: CPTII,S$GLB,, | Performed by: FAMILY MEDICINE

## 2021-07-26 PROCEDURE — 99499 RISK ADDL DX/OHS AUDIT: ICD-10-PCS | Mod: S$GLB,,, | Performed by: FAMILY MEDICINE

## 2021-07-26 PROCEDURE — 99999 PR PBB SHADOW E&M-EST. PATIENT-LVL V: CPT | Mod: PBBFAC,,, | Performed by: FAMILY MEDICINE

## 2021-07-26 PROCEDURE — 1126F PR PAIN SEVERITY QUANTIFIED, NO PAIN PRESENT: ICD-10-PCS | Mod: CPTII,S$GLB,, | Performed by: FAMILY MEDICINE

## 2021-07-26 PROCEDURE — 99499 UNLISTED E&M SERVICE: CPT | Mod: S$GLB,,, | Performed by: FAMILY MEDICINE

## 2021-07-26 PROCEDURE — 3288F PR FALLS RISK ASSESSMENT DOCUMENTED: ICD-10-PCS | Mod: CPTII,S$GLB,, | Performed by: FAMILY MEDICINE

## 2021-07-26 PROCEDURE — 3288F FALL RISK ASSESSMENT DOCD: CPT | Mod: CPTII,S$GLB,, | Performed by: FAMILY MEDICINE

## 2021-07-26 PROCEDURE — 1101F PT FALLS ASSESS-DOCD LE1/YR: CPT | Mod: CPTII,S$GLB,, | Performed by: FAMILY MEDICINE

## 2021-07-26 PROCEDURE — 3008F PR BODY MASS INDEX (BMI) DOCUMENTED: ICD-10-PCS | Mod: CPTII,S$GLB,, | Performed by: FAMILY MEDICINE

## 2021-07-26 PROCEDURE — 1126F AMNT PAIN NOTED NONE PRSNT: CPT | Mod: CPTII,S$GLB,, | Performed by: FAMILY MEDICINE

## 2021-07-26 PROCEDURE — 99214 OFFICE O/P EST MOD 30 MIN: CPT | Mod: S$GLB,,, | Performed by: FAMILY MEDICINE

## 2021-07-26 PROCEDURE — 3008F BODY MASS INDEX DOCD: CPT | Mod: CPTII,S$GLB,, | Performed by: FAMILY MEDICINE

## 2021-07-26 PROCEDURE — 1101F PR PT FALLS ASSESS DOC 0-1 FALLS W/OUT INJ PAST YR: ICD-10-PCS | Mod: CPTII,S$GLB,, | Performed by: FAMILY MEDICINE

## 2021-07-26 PROCEDURE — 99999 PR PBB SHADOW E&M-EST. PATIENT-LVL V: ICD-10-PCS | Mod: PBBFAC,,, | Performed by: FAMILY MEDICINE

## 2021-07-26 RX ORDER — HYDROXYZINE HYDROCHLORIDE 25 MG/1
25 TABLET, FILM COATED ORAL 3 TIMES DAILY PRN
Qty: 30 TABLET | Refills: 0 | Status: SHIPPED | OUTPATIENT
Start: 2021-07-26 | End: 2021-11-01 | Stop reason: SDUPTHER

## 2021-08-03 ENCOUNTER — TELEPHONE (OUTPATIENT)
Dept: FAMILY MEDICINE | Facility: CLINIC | Age: 66
End: 2021-08-03

## 2021-08-23 DIAGNOSIS — M89.69 POLIOMYELITIS OSTEOPATHY OF MULTIPLE SITES: ICD-10-CM

## 2021-08-23 DIAGNOSIS — M62.838 MUSCLE SPASM: ICD-10-CM

## 2021-08-23 DIAGNOSIS — B91 POLIOMYELITIS OSTEOPATHY OF MULTIPLE SITES: ICD-10-CM

## 2021-08-23 RX ORDER — BACLOFEN 20 MG/1
20 TABLET ORAL 3 TIMES DAILY
Qty: 270 TABLET | Refills: 1 | Status: SHIPPED | OUTPATIENT
Start: 2021-08-23 | End: 2021-08-26 | Stop reason: SDUPTHER

## 2021-08-26 DIAGNOSIS — E78.49 OTHER HYPERLIPIDEMIA: ICD-10-CM

## 2021-08-26 DIAGNOSIS — M89.69 POLIOMYELITIS OSTEOPATHY OF MULTIPLE SITES: ICD-10-CM

## 2021-08-26 DIAGNOSIS — B91 POLIOMYELITIS OSTEOPATHY OF MULTIPLE SITES: ICD-10-CM

## 2021-08-26 DIAGNOSIS — M62.838 MUSCLE SPASM: ICD-10-CM

## 2021-08-26 RX ORDER — GEMFIBROZIL 600 MG/1
600 TABLET, FILM COATED ORAL
Qty: 180 TABLET | Refills: 3 | Status: SHIPPED | OUTPATIENT
Start: 2021-08-26 | End: 2022-01-26 | Stop reason: SDUPTHER

## 2021-08-26 RX ORDER — BACLOFEN 20 MG/1
20 TABLET ORAL 3 TIMES DAILY
Qty: 270 TABLET | Refills: 1 | Status: SHIPPED | OUTPATIENT
Start: 2021-08-26 | End: 2022-01-26

## 2021-11-01 DIAGNOSIS — F41.0 PANIC ATTACK: ICD-10-CM

## 2021-11-01 RX ORDER — HYDROXYZINE HYDROCHLORIDE 25 MG/1
25 TABLET, FILM COATED ORAL 3 TIMES DAILY PRN
Qty: 30 TABLET | Refills: 0 | Status: SHIPPED | OUTPATIENT
Start: 2021-11-01 | End: 2021-11-11 | Stop reason: SDUPTHER

## 2022-01-26 ENCOUNTER — OFFICE VISIT (OUTPATIENT)
Dept: FAMILY MEDICINE | Facility: CLINIC | Age: 67
End: 2022-01-26
Payer: MEDICARE

## 2022-01-26 VITALS
DIASTOLIC BLOOD PRESSURE: 70 MMHG | SYSTOLIC BLOOD PRESSURE: 118 MMHG | BODY MASS INDEX: 22.21 KG/M2 | RESPIRATION RATE: 18 BRPM | OXYGEN SATURATION: 98 % | WEIGHT: 117.63 LBS | HEART RATE: 83 BPM | HEIGHT: 61 IN

## 2022-01-26 DIAGNOSIS — I34.1 MITRAL VALVE PROLAPSE: ICD-10-CM

## 2022-01-26 DIAGNOSIS — R91.1 PULMONARY NODULE: ICD-10-CM

## 2022-01-26 DIAGNOSIS — D68.62 LUPUS ANTICOAGULANT SYNDROME: ICD-10-CM

## 2022-01-26 DIAGNOSIS — B91 POLIOMYELITIS OSTEOPATHY OF MULTIPLE SITES: ICD-10-CM

## 2022-01-26 DIAGNOSIS — E04.2 MULTIPLE THYROID NODULES: ICD-10-CM

## 2022-01-26 DIAGNOSIS — J44.9 CHRONIC OBSTRUCTIVE PULMONARY DISEASE, UNSPECIFIED COPD TYPE: ICD-10-CM

## 2022-01-26 DIAGNOSIS — M89.69 POLIOMYELITIS OSTEOPATHY OF MULTIPLE SITES: ICD-10-CM

## 2022-01-26 DIAGNOSIS — I70.0 ATHEROSCLEROSIS OF AORTA: ICD-10-CM

## 2022-01-26 DIAGNOSIS — E78.2 MIXED HYPERLIPIDEMIA: ICD-10-CM

## 2022-01-26 DIAGNOSIS — M81.6 LOCALIZED OSTEOPOROSIS WITHOUT CURRENT PATHOLOGICAL FRACTURE: Primary | ICD-10-CM

## 2022-01-26 DIAGNOSIS — F41.0 PANIC ATTACK: ICD-10-CM

## 2022-01-26 PROBLEM — N64.4 BREAST PAIN, RIGHT: Status: RESOLVED | Noted: 2021-07-26 | Resolved: 2022-01-26

## 2022-01-26 PROBLEM — L98.8 SKIN LESION OF BREAST: Status: RESOLVED | Noted: 2021-07-26 | Resolved: 2022-01-26

## 2022-01-26 PROBLEM — M62.838 MUSCLE SPASM: Status: RESOLVED | Noted: 2019-02-05 | Resolved: 2022-01-26

## 2022-01-26 PROCEDURE — 1101F PR PT FALLS ASSESS DOC 0-1 FALLS W/OUT INJ PAST YR: ICD-10-PCS | Mod: HCNC,CPTII,S$GLB, | Performed by: FAMILY MEDICINE

## 2022-01-26 PROCEDURE — 1101F PT FALLS ASSESS-DOCD LE1/YR: CPT | Mod: HCNC,CPTII,S$GLB, | Performed by: FAMILY MEDICINE

## 2022-01-26 PROCEDURE — 3078F PR MOST RECENT DIASTOLIC BLOOD PRESSURE < 80 MM HG: ICD-10-PCS | Mod: HCNC,CPTII,S$GLB, | Performed by: FAMILY MEDICINE

## 2022-01-26 PROCEDURE — 1125F PR PAIN SEVERITY QUANTIFIED, PAIN PRESENT: ICD-10-PCS | Mod: HCNC,CPTII,S$GLB, | Performed by: FAMILY MEDICINE

## 2022-01-26 PROCEDURE — 1160F PR REVIEW ALL MEDS BY PRESCRIBER/CLIN PHARMACIST DOCUMENTED: ICD-10-PCS | Mod: HCNC,CPTII,S$GLB, | Performed by: FAMILY MEDICINE

## 2022-01-26 PROCEDURE — 99214 PR OFFICE/OUTPT VISIT, EST, LEVL IV, 30-39 MIN: ICD-10-PCS | Mod: HCNC,S$GLB,, | Performed by: FAMILY MEDICINE

## 2022-01-26 PROCEDURE — 3078F DIAST BP <80 MM HG: CPT | Mod: HCNC,CPTII,S$GLB, | Performed by: FAMILY MEDICINE

## 2022-01-26 PROCEDURE — 1159F PR MEDICATION LIST DOCUMENTED IN MEDICAL RECORD: ICD-10-PCS | Mod: HCNC,CPTII,S$GLB, | Performed by: FAMILY MEDICINE

## 2022-01-26 PROCEDURE — 3288F PR FALLS RISK ASSESSMENT DOCUMENTED: ICD-10-PCS | Mod: HCNC,CPTII,S$GLB, | Performed by: FAMILY MEDICINE

## 2022-01-26 PROCEDURE — 3074F PR MOST RECENT SYSTOLIC BLOOD PRESSURE < 130 MM HG: ICD-10-PCS | Mod: HCNC,CPTII,S$GLB, | Performed by: FAMILY MEDICINE

## 2022-01-26 PROCEDURE — 99214 OFFICE O/P EST MOD 30 MIN: CPT | Mod: HCNC,S$GLB,, | Performed by: FAMILY MEDICINE

## 2022-01-26 PROCEDURE — 3008F BODY MASS INDEX DOCD: CPT | Mod: HCNC,CPTII,S$GLB, | Performed by: FAMILY MEDICINE

## 2022-01-26 PROCEDURE — 1125F AMNT PAIN NOTED PAIN PRSNT: CPT | Mod: HCNC,CPTII,S$GLB, | Performed by: FAMILY MEDICINE

## 2022-01-26 PROCEDURE — 99999 PR PBB SHADOW E&M-EST. PATIENT-LVL V: ICD-10-PCS | Mod: PBBFAC,HCNC,, | Performed by: FAMILY MEDICINE

## 2022-01-26 PROCEDURE — 99499 RISK ADDL DX/OHS AUDIT: ICD-10-PCS | Mod: S$GLB,,, | Performed by: FAMILY MEDICINE

## 2022-01-26 PROCEDURE — 3074F SYST BP LT 130 MM HG: CPT | Mod: HCNC,CPTII,S$GLB, | Performed by: FAMILY MEDICINE

## 2022-01-26 PROCEDURE — 1159F MED LIST DOCD IN RCRD: CPT | Mod: HCNC,CPTII,S$GLB, | Performed by: FAMILY MEDICINE

## 2022-01-26 PROCEDURE — 3008F PR BODY MASS INDEX (BMI) DOCUMENTED: ICD-10-PCS | Mod: HCNC,CPTII,S$GLB, | Performed by: FAMILY MEDICINE

## 2022-01-26 PROCEDURE — 99999 PR PBB SHADOW E&M-EST. PATIENT-LVL V: CPT | Mod: PBBFAC,HCNC,, | Performed by: FAMILY MEDICINE

## 2022-01-26 PROCEDURE — 1160F RVW MEDS BY RX/DR IN RCRD: CPT | Mod: HCNC,CPTII,S$GLB, | Performed by: FAMILY MEDICINE

## 2022-01-26 PROCEDURE — 3288F FALL RISK ASSESSMENT DOCD: CPT | Mod: HCNC,CPTII,S$GLB, | Performed by: FAMILY MEDICINE

## 2022-01-26 PROCEDURE — 99499 UNLISTED E&M SERVICE: CPT | Mod: S$GLB,,, | Performed by: FAMILY MEDICINE

## 2022-01-26 RX ORDER — GEMFIBROZIL 600 MG/1
600 TABLET, FILM COATED ORAL
Qty: 180 TABLET | Refills: 3 | Status: SHIPPED | OUTPATIENT
Start: 2022-01-26 | End: 2022-11-16

## 2022-01-26 RX ORDER — HYDROXYZINE HYDROCHLORIDE 25 MG/1
25 TABLET, FILM COATED ORAL 3 TIMES DAILY PRN
Qty: 30 TABLET | Refills: 11 | Status: SHIPPED | OUTPATIENT
Start: 2022-01-26 | End: 2022-08-02

## 2022-01-26 NOTE — PROGRESS NOTES
"FAMILY MEDICINE  [facility]    Reason for visit:   Chief Complaint   Patient presents with    Follow-up       SUBJECTIVE: Julia Fernández is a 66 y.o. female   with hyperlipidemia, anxiety, osteoporosis (Rheumatologist Dr. Alana Stafford), lupus anticoagulant with h/o polio with right leg weakness and recurrent muscle spasms, pulmonary nodules, mitral valve prolapse, COPD, pulmonary nodules, esophageal dysmotility, chronic constipation and hypertriglyceridemia presents for routine follow-up.     Rheumatology Dr. Alana Stafford  Pulmonary Dr. Gaudencio Park  Cardiology Dr. Ernandez   GI: Dr. Lakisha Angel  Pain Management: Dr. Toñito Torre     She reports that she lost her entire home that her mother lived in before her in [place]. Thankfully she was able to stay with her brother who lives a walkable distance and she got a mobile home 11/2021 that she has moved into 11/2021. She reports that she is "in a better placed" and no worsening anxiety now that she has a home.     She notes that since last seen she decided to wean off of her baclofen and reports that she was able to wean off the medication without increased muscle spasms or issues. She would like to remain off of the medication             Review of Systems   All other systems reviewed and are negative.      HEALTH MAINTENANCE:   Health Maintenance   Topic Date Due    TETANUS VACCINE  Never done    DEXA SCAN  03/11/2022    High Dose Statin  01/26/2023    Mammogram  08/03/2023    Lipid Panel  07/23/2026    Hepatitis C Screening  Completed     Health Maintenance Topics with due status: Not Due       Topic Last Completion Date    DEXA SCAN 03/11/2020    Colorectal Cancer Screening 01/26/2021    Lipid Panel 07/23/2021    Mammogram 08/03/2021    High Dose Statin 01/26/2022     Health Maintenance Due   Topic Date Due    TETANUS VACCINE  Never done    Shingles Vaccine (1 of 2) Never done       HISTORY:   Past Medical History:   Diagnosis Date    " "Anxiety     Arthritis     Chronic obstructive pulmonary disease 3/6/2020    Clotting disorder     Lupus anticoagulant    GERD (gastroesophageal reflux disease)     High cholesterol     High triglycerides     Lupus     "Lupus anticoagulant"    Mitral valve prolapse     Osteoporosis     Pancreatitis     Polio     9 month old with right leg discrepency and weaknes    Scoliosis        Past Surgical History:   Procedure Laterality Date    ADENOIDECTOMY      APPENDECTOMY       SECTION      x1    CHOLECYSTECTOMY      COLONOSCOPY      ESOPHAGOGASTRODUODENOSCOPY N/A 2020    Procedure: EGD (ESOPHAGOGASTRODUODENOSCOPY);  Surgeon: Lakisha Angel MD;  [facility];  Service: Endoscopy;  Laterality: N/A;    EYE SURGERY Right     lower eyelid ("growth removed")    HYSTERECTOMY      endometriosis    TONSILLECTOMY      TUBAL LIGATION         Family History   Problem Relation Age of Onset    Arthritis Mother     Diabetes Mother     Hearing loss Mother     Vision loss Mother     COPD Mother     Depression Mother     Vision loss Father     Cancer Father         Pituitary tumor    Heart disease Father         CABG    Diabetes Sister     Hyperlipidemia Sister     Hypertension Sister     Hearing loss Sister     Drug abuse Brother     Alcohol abuse Brother     Early death Brother     Hypertension Brother     No Known Problems Son        Social History     Tobacco Use    Smoking status: Former Smoker     Packs/day: 1.00     Years: 29.00     Pack years: 29.00     Start date:      Quit date:      Years since quittin.0    Smokeless tobacco: Never Used   Substance Use Topics    Alcohol use: No    Drug use: No       Social History     Social History Narrative    . Lives with partner Taylor. 1 son. 3 grandchildren (2 girls and 1 boy). Disabled. Home destroyed in [place] which was her mother's home. She now has a mobile home on the property "       ALLERGIES:   Review of patient's allergies indicates:   Allergen Reactions    Gabapentin Other (See Comments)     dizzy    Prevnar 13 [pneumoc 13-bethany conj-dip cr(pf)] Swelling and Rash       MEDS:     Current Outpatient Medications:     aspirin (ECOTRIN) 81 MG EC tablet, Take 81 mg by mouth once daily., Disp: , Rfl:     calcium carbonate-vitamin D3 600 mg-20 mcg (800 unit) Chew, Take by mouth 2 (two) times daily., Disp: , Rfl:     hydrocodone-acetaminophen 10-325mg (NORCO)  mg Tab, Take by mouth 2 (two) times daily as needed., Disp: , Rfl:     pantoprazole (PROTONIX) 40 MG tablet, Take 1 tablet (40 mg total) by mouth once daily., Disp: 90 tablet, Rfl: 3    polyethylene glycol (GLYCOLAX) 17 gram PwPk, Take by mouth., Disp: , Rfl:     rosuvastatin (CRESTOR) 10 MG tablet, TAKE 1 TABLET EVERY DAY, Disp: 90 tablet, Rfl: 3    gemfibroziL (LOPID) 600 MG tablet, Take 1 tablet (600 mg total) by mouth 2 (two) times daily before meals., Disp: 180 tablet, Rfl: 3    hydrOXYzine HCL (ATARAX) 25 MG tablet, Take 1 tablet (25 mg total) by mouth 3 (three) times daily as needed for Anxiety., Disp: 30 tablet, Rfl: 11    Current Facility-Administered Medications:     acetaminophen tablet 500 mg, 500 mg, Oral, PRN, Almaz Tavares, DO    acetaminophen tablet 500 mg, 500 mg, Oral, PRN, Almaz Tavares, DO    heparin, porcine (PF) 100 unit/mL injection flush 500 Units, 500 Units, Intravenous, PRN, Almaz Tavares, DO    heparin, porcine (PF) 100 unit/mL injection flush 500 Units, 500 Units, Intravenous, PRN, Almaz Tavares, DO    sodium chloride 0.9% flush 10 mL, 10 mL, Intravenous, PRN, Almaz Tavares, DO    sodium chloride 0.9% flush 10 mL, 10 mL, Intravenous, PRN, Almaz Tavares, DO    zoledronic acid-mannitol & water 5 mg/100 mL infusion 5 mg, 5 mg, Intravenous, 1 time in Clinic/HOD, Almaz M. Anusha, DO    zoledronic acid-mannitol & water 5 mg/100 mL infusion 5 mg, 5 mg, Intravenous, 1 time in Clinic/HOD, Almaz Tavares,  "DO      Vital signs:   Vitals:    01/26/22 0922   BP: 118/70   BP Location: Left arm   Patient Position: Sitting   BP Method: Large (Manual)   Pulse: 83   Resp: 18   SpO2: 98%   Weight: 53.3 kg (117 lb 9.6 oz)   Height: 5' 1" (1.549 m)     Body mass index is 22.22 kg/m².  PHYSICAL EXAM:     Physical Exam  Constitutional:       General: She is not in acute distress.  Cardiovascular:      Rate and Rhythm: Normal rate and regular rhythm.      Heart sounds: Normal heart sounds. No murmur heard.  No friction rub. No gallop.    Pulmonary:      Effort: Pulmonary effort is normal.      Breath sounds: Normal breath sounds. No wheezing, rhonchi or rales.   Abdominal:      General: Bowel sounds are normal. There is no distension.      Palpations: Abdomen is soft. There is no mass.   Musculoskeletal:      Cervical back: Neck supple.      Right lower leg: No edema.      Left lower leg: No edema.   Neurological:      Mental Status: She is alert.           PHQ4 = Score: 2    PERTINENT RESULTS:   No visits with results within 1 Week(s) from this visit.   Latest known visit with results is:   Lab Visit on 07/23/2021   Component Date Value Ref Range Status    Sodium 07/23/2021 142  136 - 145 mmol/L Final    Potassium 07/23/2021 3.9  3.5 - 5.1 mmol/L Final    Chloride 07/23/2021 103  95 - 110 mmol/L Final    CO2 07/23/2021 31* 23 - 29 mmol/L Final    Glucose 07/23/2021 104  70 - 110 mg/dL Final    BUN 07/23/2021 12  7 - 17 mg/dL Final    Creatinine 07/23/2021 0.48* 0.50 - 1.40 mg/dL Final    Calcium 07/23/2021 10.4  8.7 - 10.5 mg/dL Final    Total Protein 07/23/2021 7.8  6.0 - 8.4 g/dL Final    Albumin 07/23/2021 4.8  3.5 - 5.2 g/dL Final    Total Bilirubin 07/23/2021 0.3  0.1 - 1.0 mg/dL Final    Comment: For infants and newborns, interpretation of results should be based  on gestational age, weight and in agreement with clinical  observations.    Premature Infant recommended reference ranges:  Up to 24 " hours.............<8.0 mg/dL  Up to 48 hours............<12.0 mg/dL  3-5 days..................<15.0 mg/dL  6-29 days.................<15.0 mg/dL      Alkaline Phosphatase 07/23/2021 55  38 - 126 U/L Final    AST 07/23/2021 37  15 - 46 U/L Final    ALT 07/23/2021 31  10 - 44 U/L Final    Anion Gap 07/23/2021 8  8 - 16 mmol/L Final    eGFR if African American 07/23/2021 >60.0  >60 mL/min/1.73 m^2 Final    eGFR if non African American 07/23/2021 >60.0  >60 mL/min/1.73 m^2 Final    Comment: Calculation used to obtain the estimated glomerular filtration  rate (eGFR) is the CKD-EPI equation.       WBC 07/23/2021 4.47  3.90 - 12.70 K/uL Final    RBC 07/23/2021 4.44  4.00 - 5.40 M/uL Final    Hemoglobin 07/23/2021 13.3  12.0 - 16.0 g/dL Final    Hematocrit 07/23/2021 41.0  37.0 - 48.5 % Final    MCV 07/23/2021 92  82 - 98 fL Final    MCH 07/23/2021 30.0  27.0 - 31.0 pg Final    MCHC 07/23/2021 32.4  32.0 - 36.0 g/dL Final    RDW 07/23/2021 11.8  11.5 - 14.5 % Final    Platelets 07/23/2021 217  150 - 450 K/uL Final    MPV 07/23/2021 11.0  9.2 - 12.9 fL Final    Immature Granulocytes 07/23/2021 0.4  0.0 - 0.5 % Final    Gran # (ANC) 07/23/2021 2.9  1.8 - 7.7 K/uL Final    Immature Grans (Abs) 07/23/2021 0.02  0.00 - 0.04 K/uL Final    Comment: Mild elevation in immature granulocytes is non specific and   can be seen in a variety of conditions including stress response,   acute inflammation, trauma and pregnancy. Correlation with other   laboratory and clinical findings is essential.      Lymph # 07/23/2021 1.0  1.0 - 4.8 K/uL Final    Mono # 07/23/2021 0.4  0.3 - 1.0 K/uL Final    Eos # 07/23/2021 0.1  0.0 - 0.5 K/uL Final    Baso # 07/23/2021 0.02  0.00 - 0.20 K/uL Final    nRBC 07/23/2021 0  0 /100 WBC Final    Gran % 07/23/2021 64.4  38.0 - 73.0 % Final    Lymph % 07/23/2021 23.0  18.0 - 48.0 % Final    Mono % 07/23/2021 9.6  4.0 - 15.0 % Final    Eosinophil % 07/23/2021 2.2  0.0 - 8.0 % Final     Basophil % 07/23/2021 0.4  0.0 - 1.9 % Final    Differential Method 07/23/2021 Automated   Final    Cholesterol 07/23/2021 150  120 - 199 mg/dL Final    Comment: The National Cholesterol Education Program (NCEP) has set the  following guidelines (reference ranges) for Cholesterol:  Optimal.....................<200 mg/dL  Borderline High.............200-239 mg/dL  High........................> or = 240 mg/dL      Triglycerides 07/23/2021 119  30 - 150 mg/dL Final    Comment: The National Cholesterol Education Program (NCEP) has set the  following guidelines (reference values) for triglycerides:  Normal......................<150 mg/dL  Borderline High.............150-199 mg/dL  High........................200-499 mg/dL      HDL 07/23/2021 39* 40 - 75 mg/dL Final    Comment: The National Cholesterol Education Program (NCEP) has set the  following guidelines (reference values) for HDL Cholesterol:  Low...............<40 mg/dL  Optimal...........>60 mg/dL      LDL Cholesterol 07/23/2021 87.2  63.0 - 159.0 mg/dL Final    Comment: The National Cholesterol Education Program (NCEP) has set the  following guidelines (reference values) for LDL Cholesterol:  Optimal.......................<130 mg/dL  Borderline High...............130-159 mg/dL  High..........................160-189 mg/dL  Very High.....................>190 mg/dL      HDL/Cholesterol Ratio 07/23/2021 26.0  20.0 - 50.0 % Final    Total Cholesterol/HDL Ratio 07/23/2021 3.8  2.0 - 5.0 Final    Non-HDL Cholesterol 07/23/2021 111  mg/dL Final    Comment: Risk category and Non-HDL cholesterol goals:  Coronary heart disease (CHD)or equivalent (10-year risk of CHD >20%):  Non-HDL cholesterol goal     <130 mg/dL  Two or more CHD risk factors and 10-year risk of CHD <= 20%:  Non-HDL cholesterol goal     <160 mg/dL  0 to 1 CHD risk factor:  Non-HDL cholesterol goal     <190 mg/dL      Free T4 07/23/2021 0.85  0.71 - 1.51 ng/dL Final    TSH 07/23/2021 2.100   0.400 - 4.000 uIU/mL Final    Comment: Warning:  Heterophilic antibodies in serum or plasma of   certain individuals are known to cause interference with   immunoassays. These antibodies may be present in blood samples   from individuals regularly exposed to animal or who have been   treated with animal products.     Patients taking high doses of supplemental biotin may have  negatively biased results.       PT Lupus Anticoagulant 07/23/2021 12.8  12.0 - 15.5 sec Final    PTT Lupus Anticoagulant 07/23/2021 39  32 - 48 sec Final    Thrombin Time 07/23/2021 Not Applicable  14.7 - 19.5 sec Final    Reptilase Time 07/23/2021 Not Applicable  <=21.9 sec Final    PTT Heparin Neutralized 07/23/2021 Not Applicable  32 - 48 sec Final    PTT-LA Mix 07/23/2021 Not Applicable  32 - 48 sec Final    PLATELET NEUTRALIZATION APTT 07/23/2021 Not Applicable  Negative Final    DRVVT Screen 07/23/2021 40  33 - 44 sec Final    dRVVT Incubated 1:1 Mix 07/23/2021 Not Applicable  33 - 44 sec Final    dRVVT Confirm 07/23/2021 Not Applicable  Negative ratio Final    Hex Phosph Neut Test 07/23/2021 Not Applicable  Negative Final    Lupus Anticoagulant Interpretation 07/23/2021 See Note   Final    Comment: Lupus anticoagulant not detected.  The phospholipid-dependent screening tests (PTT, DRVVT) are   not prolonged.  Lupus anticoagulant antibodies are heterogeneous and   antibody titers fluctuate over time. Laboratory tests used   to identify lupus anticoagulants demonstrate variable   sensitivity. If there is strong clinical suspicion for   antiphospholipid antibody syndrome (APS), consider testing   for cardiolipin and beta-2 glycoprotein 1 antibodies (IgG   and IgM) if this testing has not already been performed.  INTERPRETIVE INFORMATION: Lupus Anticoagulant Interpretation  This test was developed and its performance characteristics   determined by [facility]. It has not been cleared or   approved by the US Food and Drug  Administration. This test   was performed in a CLIA certified laboratory and is   intended for clinical purposes.  Counseling and informed consent are recommended for genetic   testing. Consent forms are available online.  Performed by [facility],                                                         [address], [facility] [place] [address] [phone]                    [email or web address] [email or web address] Farley MD - Lab. Director      Vit D, 25-Hydroxy 07/23/2021 54  30 - 96 ng/mL Final    Comment: Vitamin D deficiency.........<10 ng/mL                              Vitamin D insufficiency......10-29 ng/mL       Vitamin D sufficiency........> or equal to 30 ng/mL  Vitamin D toxicity............>100 ng/mL         ASSESSMENT/PLAN:  Problem List Items Addressed This Visit        Psychiatric    Panic attack    Overview     - rare panic attacks  - discouraged benzodiazepine use since she is already on opioid pain medication.  She is aware  - discontinue diazepam 5 mg  - recommend trial hydroxyzine 25 mg as needed  - previously trial propanolol without relief  - if unable to tolerate hydroxyzine will consider buspirone         Relevant Medications    hydrOXYzine HCL (ATARAX) 25 MG tablet       Pulmonary    COPD (chronic obstructive pulmonary disease)    Overview     - 5/27/2019 PFT: moderate obstruction  - 06/29/2020 CT chest without contrast:  Mild emphysematous changes  - evaluated by Dr. Park Pulmonary  - asymptomatic         Pulmonary nodule    Overview     - 8/19/19 follow-up CT chest 1cm nodule RUL similar to 4/13/19  - 06/29/2020 CT chest without contrast:  Stable spiculated nodular density in the right lung apex, similar to previous exam.  Relatively flattened appearance on the coronal and sagittal images facet she adjusting this may simply be a region of scarring rather than underlying nodule.  The nodule has more of a solid appearance on today's exam as compared to prior study.  Nodule appears similar to size to  previous study 04/13/2019  - 01/04/2021 CT chest without contrast.  Unchanged right upper lobe speculated nodule.  Recommendation to follow-up in 6 months per pulmonary.  New mild lower lobe ground-glass densities.  - followed by Dr. Gaudencio Park Pulmonary             Cardiac/Vascular    Atherosclerosis of aorta    Overview     - goal LDL <70 on statin  - BP goal < 130/80  - ASA 81 mg daily           Relevant Orders    CBC Without Differential    Mitral valve prolapse    Overview     - followed by Cardiology  - asymptomatic         Mixed hyperlipidemia    Overview     Lab Results   Component Value Date    LDLCALC 87.2 07/23/2021     - well controlled  - continue current medications         Relevant Medications    gemfibroziL (LOPID) 600 MG tablet    Other Relevant Orders    Comprehensive Metabolic Panel    Lipid Panel       Hematology    Lupus anticoagulant syndrome    Overview     - without h/o DVT/PE  - evaluated by Rheumatology Dr. Stafford   - lupus anticoagulant testing negative and reports Rheumatology recommends to monitor yearly         Relevant Orders    CBC Without Differential    LUPUS ANTICOAGULANT (DRVVT)       Endocrine    Multiple thyroid nodules    Overview     - 06/29/2020 CT chest:  Showed incidental bilateral thyroid nodules         Relevant Orders    TSH       Orthopedic    Localized osteoporosis without current pathological fracture - Primary    Overview     - last Dexa 3/20/18: lumbar normal (0.6), osteopenia left femoral neck (-1.9) and osteoporosis right femoral neck (-3.9)  - 03/11/2020 bone density:  Osteoporosis left femoral neck -5.  48.1% risk of major osteoporotic fracture and 35.3% risk of hip fracture in 10 years.  Lumbar spinal  - Reclast 5 mg started by Rheumatology (2018) and transferred to PCP  - last infusion 6/2021 and repeat 6/2022  - poor weight bearing on right leg 2/2 h/o polio which may affect bone density  - fall prevention  - over the counter vitamin D3 2129-6958 units daily  -  dietary calcium 1200 mg per day with diet or supplements           Relevant Orders    CALCITRIOL    Poliomyelitis osteopathy of multiple sites    Overview     - secondary to history of polio  - successfully weaned off of baclofen               ORDERS:   Orders Placed This Encounter    Comprehensive Metabolic Panel    CBC Without Differential    Lipid Panel    TSH    LUPUS ANTICOAGULANT (DRVVT)    CALCITRIOL    gemfibroziL (LOPID) 600 MG tablet    hydrOXYzine HCL (ATARAX) 25 MG tablet       Vaccines recommended: up to date    Follow-up in 6 months with labs or sooner if any concerns.      This note is dictated using the M*Modal Fluency Direct word recognition program. There are word recognition mistakes that are occasionally missed on review.    Dr. Almaz Tavares D.O.   [facility]

## 2022-02-02 DIAGNOSIS — K21.9 GASTROESOPHAGEAL REFLUX DISEASE WITHOUT ESOPHAGITIS: ICD-10-CM

## 2022-02-03 RX ORDER — PANTOPRAZOLE SODIUM 40 MG/1
TABLET, DELAYED RELEASE ORAL
Qty: 90 TABLET | Refills: 3 | Status: SHIPPED | OUTPATIENT
Start: 2022-02-03 | End: 2022-12-21

## 2022-02-03 NOTE — TELEPHONE ENCOUNTER
No new care gaps identified.  Powered by [employer]. Reference number: [id number].   2/02/2022 8:50:37 PM CST

## 2022-02-03 NOTE — TELEPHONE ENCOUNTER
This Rx Request does not qualify for assessment with the OR.  Please review protocol details and the Care Due Message for extra clinical information:    Reasons Rx Request may be deferred:  Labs/Vitals overdue  Labs/Vitals abnormal  Patient has been seen in the ED/Hospital since the last PCP visit  Medication is non-delegated  Provider is a non-participating provider      6.) [facility]DI Osteoporosis

## 2022-02-23 DIAGNOSIS — D84.9 IMMUNOSUPPRESSED STATUS: ICD-10-CM

## 2022-04-05 ENCOUNTER — TELEPHONE (OUTPATIENT)
Dept: FAMILY MEDICINE | Facility: CLINIC | Age: 67
End: 2022-04-05
Payer: MEDICARE

## 2022-04-05 NOTE — TELEPHONE ENCOUNTER
----- Message from Lima Richards MA sent at 4/5/2022 10:34 AM CDT -----  Regarding: FW: mammo    ----- Message -----  From: Zeynep Lanier  Sent: 4/5/2022   7:46 AM CDT  To: [facility] Staff  Subject: mammo                                            Patient has a diag mammo done in August, but I have an order for a screening. Please advise.

## 2022-05-23 ENCOUNTER — TELEPHONE (OUTPATIENT)
Dept: FAMILY MEDICINE | Facility: CLINIC | Age: 67
End: 2022-05-23
Payer: MEDICARE

## 2022-05-23 NOTE — TELEPHONE ENCOUNTER
----- Message from Rahel Liang sent at 5/23/2022  8:03 AM CDT -----  Regarding: same day  Contact: [phone]  Type:  Same Day Appointment Request    Caller is requesting a same day appointment.  Caller declined first available appointment listed below.    Name of Caller: self   When is the first available appointment? October  Symptoms: sharp pains in both breasts and a lump in her left breast.   Best Call Back Number: [phone]  Additional Information:

## 2022-05-23 NOTE — TELEPHONE ENCOUNTER
Okay to offer NP or urgent care since I do not have anything available today. If she would like to wait, I have an availability tomorrow that is on hold  Dr. Almaz Tavares D.O.   [facility]

## 2022-05-24 ENCOUNTER — OFFICE VISIT (OUTPATIENT)
Dept: FAMILY MEDICINE | Facility: CLINIC | Age: 67
End: 2022-05-24
Payer: MEDICARE

## 2022-05-24 VITALS
SYSTOLIC BLOOD PRESSURE: 138 MMHG | WEIGHT: 119.38 LBS | BODY MASS INDEX: 22.54 KG/M2 | DIASTOLIC BLOOD PRESSURE: 72 MMHG | HEIGHT: 61 IN

## 2022-05-24 DIAGNOSIS — N64.4 BREAST PAIN: Primary | ICD-10-CM

## 2022-05-24 PROCEDURE — 1125F AMNT PAIN NOTED PAIN PRSNT: CPT | Mod: CPTII,S$GLB,,

## 2022-05-24 PROCEDURE — 3075F SYST BP GE 130 - 139MM HG: CPT | Mod: CPTII,S$GLB,,

## 2022-05-24 PROCEDURE — 1101F PR PT FALLS ASSESS DOC 0-1 FALLS W/OUT INJ PAST YR: ICD-10-PCS | Mod: CPTII,S$GLB,,

## 2022-05-24 PROCEDURE — 3078F PR MOST RECENT DIASTOLIC BLOOD PRESSURE < 80 MM HG: ICD-10-PCS | Mod: CPTII,S$GLB,,

## 2022-05-24 PROCEDURE — 1160F RVW MEDS BY RX/DR IN RCRD: CPT | Mod: CPTII,S$GLB,,

## 2022-05-24 PROCEDURE — 99213 PR OFFICE/OUTPT VISIT, EST, LEVL III, 20-29 MIN: ICD-10-PCS | Mod: S$GLB,,,

## 2022-05-24 PROCEDURE — 99999 PR PBB SHADOW E&M-EST. PATIENT-LVL V: CPT | Mod: PBBFAC,,,

## 2022-05-24 PROCEDURE — 3008F PR BODY MASS INDEX (BMI) DOCUMENTED: ICD-10-PCS | Mod: CPTII,S$GLB,,

## 2022-05-24 PROCEDURE — 1125F PR PAIN SEVERITY QUANTIFIED, PAIN PRESENT: ICD-10-PCS | Mod: CPTII,S$GLB,,

## 2022-05-24 PROCEDURE — 3288F FALL RISK ASSESSMENT DOCD: CPT | Mod: CPTII,S$GLB,,

## 2022-05-24 PROCEDURE — 3078F DIAST BP <80 MM HG: CPT | Mod: CPTII,S$GLB,,

## 2022-05-24 PROCEDURE — 1101F PT FALLS ASSESS-DOCD LE1/YR: CPT | Mod: CPTII,S$GLB,,

## 2022-05-24 PROCEDURE — 99999 PR PBB SHADOW E&M-EST. PATIENT-LVL V: ICD-10-PCS | Mod: PBBFAC,,,

## 2022-05-24 PROCEDURE — 99213 OFFICE O/P EST LOW 20 MIN: CPT | Mod: S$GLB,,,

## 2022-05-24 PROCEDURE — 1159F PR MEDICATION LIST DOCUMENTED IN MEDICAL RECORD: ICD-10-PCS | Mod: CPTII,S$GLB,,

## 2022-05-24 PROCEDURE — 3288F PR FALLS RISK ASSESSMENT DOCUMENTED: ICD-10-PCS | Mod: CPTII,S$GLB,,

## 2022-05-24 PROCEDURE — 1160F PR REVIEW ALL MEDS BY PRESCRIBER/CLIN PHARMACIST DOCUMENTED: ICD-10-PCS | Mod: CPTII,S$GLB,,

## 2022-05-24 PROCEDURE — 3075F PR MOST RECENT SYSTOLIC BLOOD PRESS GE 130-139MM HG: ICD-10-PCS | Mod: CPTII,S$GLB,,

## 2022-05-24 PROCEDURE — 3008F BODY MASS INDEX DOCD: CPT | Mod: CPTII,S$GLB,,

## 2022-05-24 PROCEDURE — 1159F MED LIST DOCD IN RCRD: CPT | Mod: CPTII,S$GLB,,

## 2022-05-24 NOTE — PROGRESS NOTES
Subjective:      Patient ID: Julia Fernández is a 67 y.o. female.    Chief Complaint: Breast Pain (Patient presents today as anew patient complaining of breast pain she has been having for 1 week. )      HPI     Julia Fernández is a 66 y/o female patient of Dr. Tavares, unknown to me, presents today with c/o breast pain that started about a week ago Monday. It started in right breast, was an occasional thing, but since last week it has been constant, was really bad on Saturday to both breast, right worse than left. Feels like left breast has lump, hurts under the pit of her arms especially right arm.     Denies working hard or picking up on anything heavy. Hx: Polio so limited on what she can do.    Onset: 1 week ago   Location: bilateral breast  Duration: lasted for hours on Saturday to right breast, intermittent, comes and goes  Characteristic: sharp pain like someone is sticking her with something, comes across and goes into nipple and other times its the entire breast as if someone squeezed it  Aggravating factors: nothing  Relieving factors: nothing, tried tyleol but it didn't work; takes NOrco for other pain and that didn't work.   Temporal: intermittent comes and goes   Severity: was 10/10 on Saturday, 3/10 today      Hx:right breast pain last July 2021  Mammo digital diagnostic right with brenda-negative for malignancy    Denies family or personal history of breast cancer  Drinks about 1.5 cups of coffee per day  History of skin lesion and was referred to derm: went to derm in past and was removed; last derm said if it doesn't bother her then dont bother it.       Review of Systems   Constitutional: Negative for appetite change and fatigue.   HENT: Negative for congestion, hearing loss, postnasal drip, rhinorrhea, sinus pressure, sinus pain, sneezing and sore throat.    Eyes: Negative for pain and visual disturbance.   Respiratory: Negative.  Negative for cough and shortness of breath.    Cardiovascular: Negative  "for chest pain and leg swelling.   Gastrointestinal: Negative for abdominal pain, constipation, diarrhea, nausea and vomiting.   Endocrine: Negative.    Genitourinary: Negative for decreased urine volume, difficulty urinating, dysuria, frequency, hematuria and urgency.   Musculoskeletal: Negative for arthralgias and myalgias.   Skin: Negative for color change.   Neurological: Negative for dizziness, syncope, weakness, light-headedness, numbness and headaches.   Psychiatric/Behavioral: Negative.         Objective:     Vitals:    05/24/22 0854 05/24/22 0949   BP: (!) 140/84 138/72   Weight: 54.2 kg (119 lb 6.4 oz)    Height: 5' 1" (1.549 m)    PainSc:   4       Physical Exam  Vitals reviewed. Exam conducted with a chaperone present.   Constitutional:       Appearance: Normal appearance. She is well-developed and well-groomed.   HENT:      Head: Normocephalic and atraumatic.   Eyes:      Pupils: Pupils are equal, round, and reactive to light.   Cardiovascular:      Rate and Rhythm: Normal rate and regular rhythm.      Heart sounds: Normal heart sounds. No murmur heard.  Pulmonary:      Effort: Pulmonary effort is normal.      Breath sounds: Normal breath sounds. No wheezing, rhonchi or rales.   Chest:   Breasts:      Right: Tenderness present. No swelling, bleeding or nipple discharge.      Left: Tenderness present. No swelling, bleeding or nipple discharge.        Comments: Small mobile lump palpated at 5 o'clock to left breast, small mobile lump palpated at 4 o'clock to right breast; breast tender to touch and tender under armpits    Dry skin lesion noted to bilateral breast, No dipple discharge.   Musculoskeletal:         General: Normal range of motion.      Cervical back: Normal range of motion.      Right lower leg: No edema.      Left lower leg: No edema.      Comments: Ambulates with cane, wearing right leg brace   Skin:     General: Skin is warm and dry.      Capillary Refill: Capillary refill takes less than " 2 seconds.   Neurological:      General: No focal deficit present.      Mental Status: She is alert and oriented to person, place, and time.   Psychiatric:         Attention and Perception: Attention normal.         Mood and Affect: Mood normal.         Speech: Speech normal.         Behavior: Behavior is cooperative.        Assessment:         1. Breast pain          Plan:   1. Breast pain  - Mammo Digital Diagnostic Bilat with Cosme; Future  - US Breast Bilateral Complete; Future         -labs reviewed for proper testing and medication dosage  -continue tylenol for pain as needed   -will notify of test results    If symptoms worsen, go to ER/Urgent care.  If symptoms fail to improve, RTC.     Patient verbalizes understanding and agrees with current plan.     Lucía Minor NP    [facility]   5/24/22

## 2022-07-13 ENCOUNTER — PES CALL (OUTPATIENT)
Dept: ADMINISTRATIVE | Facility: CLINIC | Age: 67
End: 2022-07-13
Payer: MEDICARE

## 2022-07-26 ENCOUNTER — HOSPITAL ENCOUNTER (OUTPATIENT)
Dept: RADIOLOGY | Facility: HOSPITAL | Age: 67
Discharge: HOME | End: 2022-07-26
Attending: FAMILY MEDICINE
Payer: MEDICARE

## 2022-07-26 DIAGNOSIS — M47.892 OTHER SPONDYLOSIS, CERVICAL REGION: ICD-10-CM

## 2022-07-26 DIAGNOSIS — M54.2 CERVICALGIA: ICD-10-CM

## 2022-07-26 PROCEDURE — 72141 MRI NECK SPINE W/O DYE: CPT | Mod: 26,,, | Performed by: RADIOLOGY

## 2022-07-26 PROCEDURE — 72141 MRI CERVICAL SPINE WITHOUT CONTRAST: ICD-10-PCS | Mod: 26,,, | Performed by: RADIOLOGY

## 2022-07-26 PROCEDURE — 72141 MRI NECK SPINE W/O DYE: CPT | Mod: TC

## 2022-08-01 RX ORDER — ZOSTER VACCINE RECOMBINANT, ADJUVANTED 50 MCG/0.5
KIT INTRAMUSCULAR
Qty: 1 EACH | Refills: 1 | Status: CANCELLED | OUTPATIENT
Start: 2022-08-01
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
## 2022-08-02 ENCOUNTER — OFFICE VISIT (OUTPATIENT)
Dept: FAMILY MEDICINE | Facility: CLINIC | Age: 67
End: 2022-08-02
Payer: MEDICARE

## 2022-08-02 VITALS
HEIGHT: 61 IN | SYSTOLIC BLOOD PRESSURE: 120 MMHG | OXYGEN SATURATION: 97 % | DIASTOLIC BLOOD PRESSURE: 72 MMHG | RESPIRATION RATE: 18 BRPM | BODY MASS INDEX: 23.13 KG/M2 | WEIGHT: 122.5 LBS | HEART RATE: 77 BPM

## 2022-08-02 DIAGNOSIS — B91 POLIOMYELITIS OSTEOPATHY OF MULTIPLE SITES: ICD-10-CM

## 2022-08-02 DIAGNOSIS — M81.6 LOCALIZED OSTEOPOROSIS WITHOUT CURRENT PATHOLOGICAL FRACTURE: ICD-10-CM

## 2022-08-02 DIAGNOSIS — R91.1 PULMONARY NODULE: ICD-10-CM

## 2022-08-02 DIAGNOSIS — E78.2 MIXED HYPERLIPIDEMIA: ICD-10-CM

## 2022-08-02 DIAGNOSIS — G47.09 OTHER INSOMNIA: Primary | ICD-10-CM

## 2022-08-02 DIAGNOSIS — M89.69 POLIOMYELITIS OSTEOPATHY OF MULTIPLE SITES: ICD-10-CM

## 2022-08-02 DIAGNOSIS — I34.1 MITRAL VALVE PROLAPSE: ICD-10-CM

## 2022-08-02 DIAGNOSIS — E04.2 MULTIPLE THYROID NODULES: ICD-10-CM

## 2022-08-02 DIAGNOSIS — J44.9 CHRONIC OBSTRUCTIVE PULMONARY DISEASE, UNSPECIFIED COPD TYPE: ICD-10-CM

## 2022-08-02 DIAGNOSIS — D68.62 LUPUS ANTICOAGULANT SYNDROME: ICD-10-CM

## 2022-08-02 DIAGNOSIS — I70.0 ATHEROSCLEROSIS OF AORTA: ICD-10-CM

## 2022-08-02 DIAGNOSIS — K59.04 CHRONIC IDIOPATHIC CONSTIPATION: ICD-10-CM

## 2022-08-02 DIAGNOSIS — F41.0 PANIC ATTACK: ICD-10-CM

## 2022-08-02 PROCEDURE — 3078F PR MOST RECENT DIASTOLIC BLOOD PRESSURE < 80 MM HG: ICD-10-PCS | Mod: CPTII,S$GLB,, | Performed by: FAMILY MEDICINE

## 2022-08-02 PROCEDURE — 1101F PT FALLS ASSESS-DOCD LE1/YR: CPT | Mod: CPTII,S$GLB,, | Performed by: FAMILY MEDICINE

## 2022-08-02 PROCEDURE — 99214 PR OFFICE/OUTPT VISIT, EST, LEVL IV, 30-39 MIN: ICD-10-PCS | Mod: S$GLB,,, | Performed by: FAMILY MEDICINE

## 2022-08-02 PROCEDURE — 1159F PR MEDICATION LIST DOCUMENTED IN MEDICAL RECORD: ICD-10-PCS | Mod: CPTII,S$GLB,, | Performed by: FAMILY MEDICINE

## 2022-08-02 PROCEDURE — 1159F MED LIST DOCD IN RCRD: CPT | Mod: CPTII,S$GLB,, | Performed by: FAMILY MEDICINE

## 2022-08-02 PROCEDURE — 1125F PR PAIN SEVERITY QUANTIFIED, PAIN PRESENT: ICD-10-PCS | Mod: CPTII,S$GLB,, | Performed by: FAMILY MEDICINE

## 2022-08-02 PROCEDURE — 3074F SYST BP LT 130 MM HG: CPT | Mod: CPTII,S$GLB,, | Performed by: FAMILY MEDICINE

## 2022-08-02 PROCEDURE — 3008F BODY MASS INDEX DOCD: CPT | Mod: CPTII,S$GLB,, | Performed by: FAMILY MEDICINE

## 2022-08-02 PROCEDURE — 99214 OFFICE O/P EST MOD 30 MIN: CPT | Mod: S$GLB,,, | Performed by: FAMILY MEDICINE

## 2022-08-02 PROCEDURE — 1125F AMNT PAIN NOTED PAIN PRSNT: CPT | Mod: CPTII,S$GLB,, | Performed by: FAMILY MEDICINE

## 2022-08-02 PROCEDURE — 3078F DIAST BP <80 MM HG: CPT | Mod: CPTII,S$GLB,, | Performed by: FAMILY MEDICINE

## 2022-08-02 PROCEDURE — 99999 PR PBB SHADOW E&M-EST. PATIENT-LVL IV: CPT | Mod: PBBFAC,,, | Performed by: FAMILY MEDICINE

## 2022-08-02 PROCEDURE — 3008F PR BODY MASS INDEX (BMI) DOCUMENTED: ICD-10-PCS | Mod: CPTII,S$GLB,, | Performed by: FAMILY MEDICINE

## 2022-08-02 PROCEDURE — 1160F PR REVIEW ALL MEDS BY PRESCRIBER/CLIN PHARMACIST DOCUMENTED: ICD-10-PCS | Mod: CPTII,S$GLB,, | Performed by: FAMILY MEDICINE

## 2022-08-02 PROCEDURE — 3288F FALL RISK ASSESSMENT DOCD: CPT | Mod: CPTII,S$GLB,, | Performed by: FAMILY MEDICINE

## 2022-08-02 PROCEDURE — 3288F PR FALLS RISK ASSESSMENT DOCUMENTED: ICD-10-PCS | Mod: CPTII,S$GLB,, | Performed by: FAMILY MEDICINE

## 2022-08-02 PROCEDURE — 1101F PR PT FALLS ASSESS DOC 0-1 FALLS W/OUT INJ PAST YR: ICD-10-PCS | Mod: CPTII,S$GLB,, | Performed by: FAMILY MEDICINE

## 2022-08-02 PROCEDURE — 1160F RVW MEDS BY RX/DR IN RCRD: CPT | Mod: CPTII,S$GLB,, | Performed by: FAMILY MEDICINE

## 2022-08-02 PROCEDURE — 99999 PR PBB SHADOW E&M-EST. PATIENT-LVL IV: ICD-10-PCS | Mod: PBBFAC,,, | Performed by: FAMILY MEDICINE

## 2022-08-02 PROCEDURE — 3074F PR MOST RECENT SYSTOLIC BLOOD PRESSURE < 130 MM HG: ICD-10-PCS | Mod: CPTII,S$GLB,, | Performed by: FAMILY MEDICINE

## 2022-08-02 RX ORDER — HYDROXYZINE HYDROCHLORIDE 25 MG/1
25 TABLET, FILM COATED ORAL 3 TIMES DAILY PRN
Qty: 180 TABLET | Refills: 3 | Status: SHIPPED | OUTPATIENT
Start: 2022-08-02 | End: 2024-03-19

## 2022-08-02 RX ORDER — TRAZODONE HYDROCHLORIDE 50 MG/1
50 TABLET ORAL NIGHTLY PRN
Qty: 90 TABLET | Refills: 3 | Status: SHIPPED | OUTPATIENT
Start: 2022-08-02 | End: 2023-08-17

## 2022-08-03 ENCOUNTER — TELEPHONE (OUTPATIENT)
Dept: FAMILY MEDICINE | Facility: CLINIC | Age: 67
End: 2022-08-03
Payer: MEDICARE

## 2022-08-03 NOTE — TELEPHONE ENCOUNTER
----- Message from Lima Richards MA sent at 8/2/2022  7:10 PM CDT -----    ----- Message -----  From: Almaz Tavares DO  Sent: 8/2/2022   5:04 PM CDT  To: [facility] Staff    Please call Julia Fernández'   1. Bone density still shows osteoporosis of her right femur > left femur but it does seem better than her bone density in 2020  2. Repeat in 2-3 years  3. Continue Reclast yearly and next due 6/2023    Dr. Almaz Tavares D.O.   Family Medicine

## 2023-01-24 DIAGNOSIS — J44.1 COPD EXACERBATION: Primary | ICD-10-CM

## 2023-01-26 ENCOUNTER — HOSPITAL ENCOUNTER (OUTPATIENT)
Dept: RADIOLOGY | Facility: HOSPITAL | Age: 68
Discharge: HOME | End: 2023-01-26
Attending: INTERNAL MEDICINE
Payer: MEDICARE

## 2023-01-26 DIAGNOSIS — J44.1 COPD EXACERBATION: ICD-10-CM

## 2023-01-26 PROCEDURE — 71250 CT THORAX DX C-: CPT | Mod: 26,HCNC,, | Performed by: RADIOLOGY

## 2023-01-26 PROCEDURE — 71250 CT CHEST WITHOUT CONTRAST: ICD-10-PCS | Mod: 26,HCNC,, | Performed by: RADIOLOGY

## 2023-01-26 PROCEDURE — 71250 CT THORAX DX C-: CPT | Mod: TC,HCNC

## 2023-02-09 ENCOUNTER — OFFICE VISIT (OUTPATIENT)
Dept: FAMILY MEDICINE | Facility: CLINIC | Age: 68
End: 2023-02-09
Payer: MEDICARE

## 2023-02-09 VITALS
DIASTOLIC BLOOD PRESSURE: 72 MMHG | HEIGHT: 61 IN | HEART RATE: 77 BPM | SYSTOLIC BLOOD PRESSURE: 138 MMHG | OXYGEN SATURATION: 97 % | WEIGHT: 120 LBS | BODY MASS INDEX: 22.66 KG/M2

## 2023-02-09 DIAGNOSIS — J44.9 CHRONIC OBSTRUCTIVE PULMONARY DISEASE, UNSPECIFIED COPD TYPE: ICD-10-CM

## 2023-02-09 DIAGNOSIS — E04.2 MULTIPLE THYROID NODULES: ICD-10-CM

## 2023-02-09 DIAGNOSIS — I70.0 ATHEROSCLEROSIS OF AORTA: ICD-10-CM

## 2023-02-09 DIAGNOSIS — F11.20 OPIOID DEPENDENCE, UNCOMPLICATED: ICD-10-CM

## 2023-02-09 DIAGNOSIS — G47.00 INSOMNIA, UNSPECIFIED TYPE: ICD-10-CM

## 2023-02-09 DIAGNOSIS — E78.2 MIXED HYPERLIPIDEMIA: ICD-10-CM

## 2023-02-09 DIAGNOSIS — D68.62 LUPUS ANTICOAGULANT SYNDROME: ICD-10-CM

## 2023-02-09 DIAGNOSIS — M81.6 LOCALIZED OSTEOPOROSIS WITHOUT CURRENT PATHOLOGICAL FRACTURE: Primary | ICD-10-CM

## 2023-02-09 DIAGNOSIS — G47.09 OTHER INSOMNIA: ICD-10-CM

## 2023-02-09 PROCEDURE — 1160F PR REVIEW ALL MEDS BY PRESCRIBER/CLIN PHARMACIST DOCUMENTED: ICD-10-PCS | Mod: HCNC,CPTII,S$GLB, | Performed by: FAMILY MEDICINE

## 2023-02-09 PROCEDURE — 1101F PR PT FALLS ASSESS DOC 0-1 FALLS W/OUT INJ PAST YR: ICD-10-PCS | Mod: HCNC,CPTII,S$GLB, | Performed by: FAMILY MEDICINE

## 2023-02-09 PROCEDURE — 1159F PR MEDICATION LIST DOCUMENTED IN MEDICAL RECORD: ICD-10-PCS | Mod: HCNC,CPTII,S$GLB, | Performed by: FAMILY MEDICINE

## 2023-02-09 PROCEDURE — 99214 OFFICE O/P EST MOD 30 MIN: CPT | Mod: HCNC,S$GLB,, | Performed by: FAMILY MEDICINE

## 2023-02-09 PROCEDURE — 3078F DIAST BP <80 MM HG: CPT | Mod: HCNC,CPTII,S$GLB, | Performed by: FAMILY MEDICINE

## 2023-02-09 PROCEDURE — 1101F PT FALLS ASSESS-DOCD LE1/YR: CPT | Mod: HCNC,CPTII,S$GLB, | Performed by: FAMILY MEDICINE

## 2023-02-09 PROCEDURE — 1159F MED LIST DOCD IN RCRD: CPT | Mod: HCNC,CPTII,S$GLB, | Performed by: FAMILY MEDICINE

## 2023-02-09 PROCEDURE — 3075F SYST BP GE 130 - 139MM HG: CPT | Mod: HCNC,CPTII,S$GLB, | Performed by: FAMILY MEDICINE

## 2023-02-09 PROCEDURE — 99214 PR OFFICE/OUTPT VISIT, EST, LEVL IV, 30-39 MIN: ICD-10-PCS | Mod: HCNC,S$GLB,, | Performed by: FAMILY MEDICINE

## 2023-02-09 PROCEDURE — 1160F RVW MEDS BY RX/DR IN RCRD: CPT | Mod: HCNC,CPTII,S$GLB, | Performed by: FAMILY MEDICINE

## 2023-02-09 PROCEDURE — 99999 PR PBB SHADOW E&M-EST. PATIENT-LVL III: ICD-10-PCS | Mod: PBBFAC,HCNC,, | Performed by: FAMILY MEDICINE

## 2023-02-09 PROCEDURE — 3008F PR BODY MASS INDEX (BMI) DOCUMENTED: ICD-10-PCS | Mod: HCNC,CPTII,S$GLB, | Performed by: FAMILY MEDICINE

## 2023-02-09 PROCEDURE — 3288F PR FALLS RISK ASSESSMENT DOCUMENTED: ICD-10-PCS | Mod: HCNC,CPTII,S$GLB, | Performed by: FAMILY MEDICINE

## 2023-02-09 PROCEDURE — 3008F BODY MASS INDEX DOCD: CPT | Mod: HCNC,CPTII,S$GLB, | Performed by: FAMILY MEDICINE

## 2023-02-09 PROCEDURE — 3288F FALL RISK ASSESSMENT DOCD: CPT | Mod: HCNC,CPTII,S$GLB, | Performed by: FAMILY MEDICINE

## 2023-02-09 PROCEDURE — 3078F PR MOST RECENT DIASTOLIC BLOOD PRESSURE < 80 MM HG: ICD-10-PCS | Mod: HCNC,CPTII,S$GLB, | Performed by: FAMILY MEDICINE

## 2023-02-09 PROCEDURE — 3075F PR MOST RECENT SYSTOLIC BLOOD PRESS GE 130-139MM HG: ICD-10-PCS | Mod: HCNC,CPTII,S$GLB, | Performed by: FAMILY MEDICINE

## 2023-02-09 PROCEDURE — 99999 PR PBB SHADOW E&M-EST. PATIENT-LVL III: CPT | Mod: PBBFAC,HCNC,, | Performed by: FAMILY MEDICINE

## 2023-02-09 RX ORDER — ZOLPIDEM TARTRATE 5 MG/1
5 TABLET ORAL NIGHTLY PRN
Qty: 15 TABLET | Refills: 0 | Status: SHIPPED | OUTPATIENT
Start: 2023-02-09 | End: 2023-03-23 | Stop reason: SDUPTHER

## 2023-03-17 ENCOUNTER — TELEPHONE (OUTPATIENT)
Dept: FAMILY MEDICINE | Facility: CLINIC | Age: 68
End: 2023-03-17
Payer: MEDICARE

## 2023-03-23 ENCOUNTER — TELEPHONE (OUTPATIENT)
Dept: FAMILY MEDICINE | Facility: CLINIC | Age: 68
End: 2023-03-23
Payer: MEDICARE

## 2023-03-23 RX ORDER — ZOLPIDEM TARTRATE 5 MG/1
5 TABLET ORAL NIGHTLY PRN
Qty: 30 TABLET | Refills: 1 | Status: SHIPPED | OUTPATIENT
Start: 2023-03-23 | End: 2023-08-17 | Stop reason: SDUPTHER

## 2023-03-23 NOTE — TELEPHONE ENCOUNTER
----- Message from Kendrick Joseph sent at 3/23/2023  1:02 PM CDT -----  Contact: pt  .Type:  Needs Medical Advice    Who Called: pt    Pharmacy name and phone #:  [id number] - [place] - [id number]   Phone: [phone]  Fax:  [phone]  Would the patient rather a call back or a response via [facility]?  Call back  Best Call Back Number: [phone]   Additional Information: Pt. Is requesting a refill on her zolpidem (AMBIEN) 5 MG Tab

## 2023-06-21 ENCOUNTER — TELEPHONE (OUTPATIENT)
Dept: PRIMARY CARE CLINIC | Facility: CLINIC | Age: 68
End: 2023-06-21
Payer: MEDICARE

## 2023-06-21 NOTE — TELEPHONE ENCOUNTER
----- Message from Almaz Tavares DO sent at 6/20/2023  6:34 PM CDT -----  Please call Julia Fernández. Labs normal

## 2023-06-30 ENCOUNTER — TELEPHONE (OUTPATIENT)
Dept: FAMILY MEDICINE | Facility: CLINIC | Age: 68
End: 2023-06-30
Payer: MEDICARE

## 2023-06-30 NOTE — TELEPHONE ENCOUNTER
----- Message from Stefanie Lanier sent at 6/30/2023 11:23 AM CDT -----  Pt Requesting Call Back    Who called: Michelle wood [facility]  Who called for pt:  Best call back #: [phone]  Add notes: following up on rx request for pt's Zolpidem 5mg

## 2023-08-17 ENCOUNTER — OFFICE VISIT (OUTPATIENT)
Dept: FAMILY MEDICINE | Facility: CLINIC | Age: 68
End: 2023-08-17
Payer: MEDICARE

## 2023-08-17 VITALS
HEART RATE: 96 BPM | HEIGHT: 61 IN | WEIGHT: 120.13 LBS | BODY MASS INDEX: 22.68 KG/M2 | SYSTOLIC BLOOD PRESSURE: 120 MMHG | OXYGEN SATURATION: 96 % | DIASTOLIC BLOOD PRESSURE: 78 MMHG

## 2023-08-17 DIAGNOSIS — F11.20 OPIOID DEPENDENCE, UNCOMPLICATED: Chronic | ICD-10-CM

## 2023-08-17 DIAGNOSIS — M89.69 POLIOMYELITIS OSTEOPATHY OF MULTIPLE SITES: Primary | ICD-10-CM

## 2023-08-17 DIAGNOSIS — E78.2 MIXED HYPERLIPIDEMIA: ICD-10-CM

## 2023-08-17 DIAGNOSIS — G47.00 INSOMNIA, UNSPECIFIED TYPE: Chronic | ICD-10-CM

## 2023-08-17 DIAGNOSIS — B91 POLIOMYELITIS OSTEOPATHY OF MULTIPLE SITES: Primary | ICD-10-CM

## 2023-08-17 PROCEDURE — 1160F PR REVIEW ALL MEDS BY PRESCRIBER/CLIN PHARMACIST DOCUMENTED: ICD-10-PCS | Mod: HCNC,CPTII,S$GLB, | Performed by: FAMILY MEDICINE

## 2023-08-17 PROCEDURE — 1160F RVW MEDS BY RX/DR IN RCRD: CPT | Mod: HCNC,CPTII,S$GLB, | Performed by: FAMILY MEDICINE

## 2023-08-17 PROCEDURE — 1159F MED LIST DOCD IN RCRD: CPT | Mod: HCNC,CPTII,S$GLB, | Performed by: FAMILY MEDICINE

## 2023-08-17 PROCEDURE — 3008F BODY MASS INDEX DOCD: CPT | Mod: HCNC,CPTII,S$GLB, | Performed by: FAMILY MEDICINE

## 2023-08-17 PROCEDURE — 99214 OFFICE O/P EST MOD 30 MIN: CPT | Mod: HCNC,S$GLB,, | Performed by: FAMILY MEDICINE

## 2023-08-17 PROCEDURE — 3074F PR MOST RECENT SYSTOLIC BLOOD PRESSURE < 130 MM HG: ICD-10-PCS | Mod: HCNC,CPTII,S$GLB, | Performed by: FAMILY MEDICINE

## 2023-08-17 PROCEDURE — 1101F PT FALLS ASSESS-DOCD LE1/YR: CPT | Mod: HCNC,CPTII,S$GLB, | Performed by: FAMILY MEDICINE

## 2023-08-17 PROCEDURE — 3288F FALL RISK ASSESSMENT DOCD: CPT | Mod: HCNC,CPTII,S$GLB, | Performed by: FAMILY MEDICINE

## 2023-08-17 PROCEDURE — 1101F PR PT FALLS ASSESS DOC 0-1 FALLS W/OUT INJ PAST YR: ICD-10-PCS | Mod: HCNC,CPTII,S$GLB, | Performed by: FAMILY MEDICINE

## 2023-08-17 PROCEDURE — 99999 PR PBB SHADOW E&M-EST. PATIENT-LVL III: CPT | Mod: PBBFAC,HCNC,, | Performed by: FAMILY MEDICINE

## 2023-08-17 PROCEDURE — 1126F AMNT PAIN NOTED NONE PRSNT: CPT | Mod: HCNC,CPTII,S$GLB, | Performed by: FAMILY MEDICINE

## 2023-08-17 PROCEDURE — 3078F DIAST BP <80 MM HG: CPT | Mod: HCNC,CPTII,S$GLB, | Performed by: FAMILY MEDICINE

## 2023-08-17 PROCEDURE — 3288F PR FALLS RISK ASSESSMENT DOCUMENTED: ICD-10-PCS | Mod: HCNC,CPTII,S$GLB, | Performed by: FAMILY MEDICINE

## 2023-08-17 PROCEDURE — 3078F PR MOST RECENT DIASTOLIC BLOOD PRESSURE < 80 MM HG: ICD-10-PCS | Mod: HCNC,CPTII,S$GLB, | Performed by: FAMILY MEDICINE

## 2023-08-17 PROCEDURE — 99999 PR PBB SHADOW E&M-EST. PATIENT-LVL III: ICD-10-PCS | Mod: PBBFAC,HCNC,, | Performed by: FAMILY MEDICINE

## 2023-08-17 PROCEDURE — 3008F PR BODY MASS INDEX (BMI) DOCUMENTED: ICD-10-PCS | Mod: HCNC,CPTII,S$GLB, | Performed by: FAMILY MEDICINE

## 2023-08-17 PROCEDURE — 99214 PR OFFICE/OUTPT VISIT, EST, LEVL IV, 30-39 MIN: ICD-10-PCS | Mod: HCNC,S$GLB,, | Performed by: FAMILY MEDICINE

## 2023-08-17 PROCEDURE — 1126F PR PAIN SEVERITY QUANTIFIED, NO PAIN PRESENT: ICD-10-PCS | Mod: HCNC,CPTII,S$GLB, | Performed by: FAMILY MEDICINE

## 2023-08-17 PROCEDURE — 1159F PR MEDICATION LIST DOCUMENTED IN MEDICAL RECORD: ICD-10-PCS | Mod: HCNC,CPTII,S$GLB, | Performed by: FAMILY MEDICINE

## 2023-08-17 PROCEDURE — 3074F SYST BP LT 130 MM HG: CPT | Mod: HCNC,CPTII,S$GLB, | Performed by: FAMILY MEDICINE

## 2023-08-17 RX ORDER — ZOLPIDEM TARTRATE 5 MG/1
5 TABLET ORAL NIGHTLY PRN
Qty: 30 TABLET | Refills: 5 | Status: SHIPPED | OUTPATIENT
Start: 2023-08-17 | End: 2024-03-19

## 2023-08-17 RX ORDER — ROSUVASTATIN CALCIUM 10 MG/1
10 TABLET, COATED ORAL DAILY
Qty: 90 TABLET | Refills: 3 | Status: SHIPPED | OUTPATIENT
Start: 2023-08-17

## 2023-08-17 RX ORDER — GEMFIBROZIL 600 MG/1
600 TABLET, FILM COATED ORAL
Qty: 180 TABLET | Refills: 3 | Status: SHIPPED | OUTPATIENT
Start: 2023-08-17

## 2023-08-17 RX ORDER — CYCLOBENZAPRINE HCL 5 MG
5 TABLET ORAL 3 TIMES DAILY PRN
Qty: 90 TABLET | Refills: 0 | Status: SHIPPED | OUTPATIENT
Start: 2023-08-17 | End: 2023-11-27 | Stop reason: SDUPTHER

## 2023-08-17 NOTE — PROGRESS NOTES
"PATIENT VISIT FAMILY MEDICINE    CC:   Chief Complaint   Patient presents with    Follow-up       HPI: Julia Fernández  is a 68 y.o. female:    Patient is {ayintro:32778} Patient presents {ayintro2:82333} for {ayintro3:30729}        PMHX:   Past Medical History:   Diagnosis Date    Anxiety     Arthritis     Chronic obstructive pulmonary disease 3/6/2020    Clotting disorder     Lupus anticoagulant    GERD (gastroesophageal reflux disease)     High cholesterol     High triglycerides     Lupus     "Lupus anticoagulant"    Mitral valve prolapse     Osteoporosis     Pancreatitis     Polio     9 month old with right leg discrepency and weaknes    Scoliosis        PSHX:   Past Surgical History:   Procedure Laterality Date    ADENOIDECTOMY      APPENDECTOMY       SECTION      x1    CHOLECYSTECTOMY      COLONOSCOPY      ESOPHAGOGASTRODUODENOSCOPY N/A 2020    Procedure: EGD (ESOPHAGOGASTRODUODENOSCOPY);  Surgeon: Lakisha Angel MD;  Location: [facility];  Service: Endoscopy;  Laterality: N/A;    EYE SURGERY Right     lower eyelid ("growth removed")    HYSTERECTOMY      endometriosis    TONSILLECTOMY      TUBAL LIGATION         FAMHX:   Family History   Problem Relation Age of Onset    Arthritis Mother     Diabetes Mother     Hearing loss Mother     Vision loss Mother     COPD Mother     Depression Mother     Vision loss Father     Cancer Father         Pituitary tumor    Heart disease Father         CABG    Diabetes Sister     Hyperlipidemia Sister     Hypertension Sister     Hearing loss Sister     Drug abuse Brother     Alcohol abuse Brother     Early death Brother     Hypertension Brother     No Known Problems Son        SOCHX:   Social History     Socioeconomic History    Marital status:     Number of children: 1   Tobacco Use    Smoking status: Former     Current packs/day: 0.00     Average packs/day: 1 pack/day for 29.0 years (29.0 ttl pk-yrs)     Types: Cigarettes     Start date:      " Quit date:      Years since quittin.6     Passive exposure: Past    Smokeless tobacco: Never   Substance and Sexual Activity    Alcohol use: No    Drug use: No    Sexual activity: Not Currently     Partners: Male   Social History Narrative    . Lives with partner Taylor. 1 son. 3 grandchildren (2 girls and 1 boy). Disabled. Home destroyed in [place] which was her mother's home. She now has a mobile home on the property     Social Determinants of Health     Stress: No Stress Concern Present (3/6/2020)    [employer] - Occupational Stress Questionnaire     Feeling of Stress : Not at all       ALLERGIES:   Review of patient's allergies indicates:   Allergen Reactions    Gabapentin Other (See Comments)     dizzy    Prevnar 13 [pneumoc 13-bethany conj-dip cr(pf)] Swelling and Rash       MEDS:   Current Outpatient Medications:     aspirin (ECOTRIN) 81 MG EC tablet, Take 81 mg by mouth once daily., Disp: , Rfl:     calcium carbonate-vitamin D3 600 mg-20 mcg (800 unit) Chew, Take by mouth 2 (two) times daily., Disp: , Rfl:     hydrocodone-acetaminophen 10-325mg (NORCO)  mg Tab, Take by mouth 2 (two) times daily as needed., Disp: , Rfl:     hydrOXYzine HCL (ATARAX) 25 MG tablet, Take 1 tablet (25 mg total) by mouth 3 (three) times daily as needed for Anxiety., Disp: 180 tablet, Rfl: 3    pantoprazole (PROTONIX) 40 MG tablet, TAKE 1 TABLET EVERY DAY, Disp: 90 tablet, Rfl: 2    polyethylene glycol (GLYCOLAX) 17 gram PwPk, Take by mouth., Disp: , Rfl:     cyclobenzaprine (FLEXERIL) 5 MG tablet, Take 1 tablet (5 mg total) by mouth 3 (three) times daily as needed for Muscle spasms., Disp: 90 tablet, Rfl: 0    gemfibroziL (LOPID) 600 MG tablet, Take 1 tablet (600 mg total) by mouth 2 (two) times daily before meals., Disp: 180 tablet, Rfl: 3    rosuvastatin (CRESTOR) 10 MG tablet, Take 1 tablet (10 mg total) by mouth once daily., Disp: 90 tablet, Rfl: 3    zolpidem (AMBIEN) 5  "MG Tab, Take 1 tablet (5 mg total) by mouth nightly as needed (sleep)., Disp: 30 tablet, Rfl: 5    OBJECTIVE:   Vitals:    08/17/23 0823   BP: 120/78   BP Location: Left forearm   Patient Position: Sitting   BP Method: Medium (Manual)   Pulse: 96   SpO2: 96%   Weight: 54.5 kg (120 lb 2.4 oz)   Height: 5' 1" (1.549 m)     Body mass index is 22.7 kg/m².      Physical Exam      LABS:   A1C:      CBC:  Recent Labs   Lab 08/11/23 0737   WBC 5.86   RBC 4.79   Hemoglobin 14.2   Hematocrit 42.6   Platelets 223   MCV 89   MCH 29.6   MCHC 33.3     CMP:  Recent Labs   Lab 08/11/23 0737   Glucose 97   Calcium 9.4   Albumin 4.7   Total Protein 8.1   Sodium 141   Potassium 4.0   CO2 25   Chloride 104   BUN 16   Creatinine 0.53   Alkaline Phosphatase 54   ALT 29   AST 30   Total Bilirubin 0.5     LIPIDS:  Recent Labs   Lab 08/11/23 0737   TSH 2.160   HDL 44   Cholesterol 149   Triglycerides 101   LDL Cholesterol 84.8   HDL/Cholesterol Ratio 29.5   Non-HDL Cholesterol 105   Total Cholesterol/HDL Ratio 3.4     TSH:  Recent Labs   Lab 08/11/23  0737   TSH 2.160         ASSESSMENT & PLAN:    Problem List Items Addressed This Visit          Cardiac/Vascular    Mixed hyperlipidemia (Chronic)    Overview     Lab Results   Component Value Date    LDLCALC 102.0 07/29/2022   - well controlled  - continue current medications         Relevant Medications    gemfibroziL (LOPID) 600 MG tablet    rosuvastatin (CRESTOR) 10 MG tablet       Orthopedic    Poliomyelitis osteopathy of multiple sites - Primary    Overview     - secondary to history of polio  - no longer on baclofen  - right ankle AFO orthotic            Other    Insomnia (Chronic)    Overview     - acute on chronic issue  - counseling on sleep hygiene  - recommend restart melatonin 6 mg at bedtime  - melatonin and trazodone ineffective  -can take Ambien PRN, not intended for daily/longterm use.  reviewed and appropriate         Relevant Medications    zolpidem (AMBIEN) 5 MG Tab "         Follow up in about 6 months (around 2/17/2024) for insomnia f/u.      RTC/ED precautions discussed where applicable.   Encouraged patient to let me know if there are any further questions/concerns.     Advise patient/caretaker to check with insurance regarding orders to avoid unexpected fees/costs.     The patient/caretaker indicates understanding of these issues and agrees with the plan.    Dr. Kimmy Trammell MD  Family Medicine

## 2023-08-17 NOTE — PROGRESS NOTES
"PATIENT VISIT FAMILY MEDICINE    CC:   Chief Complaint   Patient presents with    Follow-up       HPI: Julia Fernández  is a 68 y.o. female:    Patient is known to me.  Patient presents alone for routine follow up on chronic conditions.    Pt reports she is taking ambien 1-3 days a week. Reports good sleep. No groggy feeling in the morning.     She also has chronic muscle aches from polio - diagnosed at 9 months. Primarily right sided hip, arm, neck some left side. Wear leg brace on right leg, and uses cane. Interested in trying muscle relaxants. Hx of of taking muscle relaxants 20-30 years ago with improvement. No recent injuries.     Not taking trazodone - makes her dizzy. Not taking atarax.     No fever, chills, weightloss, chest pain, abdominal pain, SOB.     PMHX:   Past Medical History:   Diagnosis Date    Anxiety     Arthritis     Chronic obstructive pulmonary disease 3/6/2020    Clotting disorder     Lupus anticoagulant    GERD (gastroesophageal reflux disease)     High cholesterol     High triglycerides     Lupus     "Lupus anticoagulant"    Mitral valve prolapse     Osteoporosis     Pancreatitis     Polio     9 month old with right leg discrepency and weaknes    Scoliosis        PSHX:   Past Surgical History:   Procedure Laterality Date    ADENOIDECTOMY      APPENDECTOMY       SECTION      x1    CHOLECYSTECTOMY      COLONOSCOPY      ESOPHAGOGASTRODUODENOSCOPY N/A 2020    Procedure: EGD (ESOPHAGOGASTRODUODENOSCOPY);  Surgeon: Lakisha Angel MD;  Location: [facility];  Service: Endoscopy;  Laterality: N/A;    EYE SURGERY Right     lower eyelid ("growth removed")    HYSTERECTOMY      endometriosis    TONSILLECTOMY      TUBAL LIGATION         FAMHX:   Family History   Problem Relation Age of Onset    Arthritis Mother     Diabetes Mother     Hearing loss Mother     Vision loss Mother     COPD Mother     Depression Mother     Vision loss Father     Cancer Father         Pituitary tumor    " Heart disease Father         CABG    Diabetes Sister     Hyperlipidemia Sister     Hypertension Sister     Hearing loss Sister     Drug abuse Brother     Alcohol abuse Brother     Early death Brother     Hypertension Brother     No Known Problems Son        SOCHX:   Social History     Socioeconomic History    Marital status:     Number of children: 1   Tobacco Use    Smoking status: Former     Current packs/day: 0.00     Average packs/day: 1 pack/day for 29.0 years (29.0 ttl pk-yrs)     Types: Cigarettes     Start date:      Quit date:      Years since quittin.6     Passive exposure: Past    Smokeless tobacco: Never   Substance and Sexual Activity    Alcohol use: No    Drug use: No    Sexual activity: Not Currently     Partners: Male   Social History Narrative    . Lives with partner Taylor. 1 son. 3 grandchildren (2 girls and 1 boy). Disabled. Home destroyed in [place] which was her mother's home. She now has a mobile home on the property     Social Determinants of Health     Stress: No Stress Concern Present (3/6/2020)    [employer] [employer] - Occupational Stress Questionnaire     Feeling of Stress : Not at all       ALLERGIES:   Review of patient's allergies indicates:   Allergen Reactions    Gabapentin Other (See Comments)     dizzy    Prevnar 13 [pneumoc 13-bethany conj-dip cr(pf)] Swelling and Rash       MEDS:   Current Outpatient Medications:     aspirin (ECOTRIN) 81 MG EC tablet, Take 81 mg by mouth once daily., Disp: , Rfl:     calcium carbonate-vitamin D3 600 mg-20 mcg (800 unit) Chew, Take by mouth 2 (two) times daily., Disp: , Rfl:     hydrocodone-acetaminophen 10-325mg (NORCO)  mg Tab, Take by mouth 2 (two) times daily as needed., Disp: , Rfl:     hydrOXYzine HCL (ATARAX) 25 MG tablet, Take 1 tablet (25 mg total) by mouth 3 (three) times daily as needed for Anxiety., Disp: 180 tablet, Rfl: 3    pantoprazole (PROTONIX) 40 MG tablet, TAKE 1 TABLET  "EVERY DAY, Disp: 90 tablet, Rfl: 2    polyethylene glycol (GLYCOLAX) 17 gram PwPk, Take by mouth., Disp: , Rfl:     cyclobenzaprine (FLEXERIL) 5 MG tablet, Take 1 tablet (5 mg total) by mouth 3 (three) times daily as needed for Muscle spasms., Disp: 90 tablet, Rfl: 0    gemfibroziL (LOPID) 600 MG tablet, Take 1 tablet (600 mg total) by mouth 2 (two) times daily before meals., Disp: 180 tablet, Rfl: 3    rosuvastatin (CRESTOR) 10 MG tablet, Take 1 tablet (10 mg total) by mouth once daily., Disp: 90 tablet, Rfl: 3    zolpidem (AMBIEN) 5 MG Tab, Take 1 tablet (5 mg total) by mouth nightly as needed (sleep)., Disp: 30 tablet, Rfl: 5    OBJECTIVE:   Vitals:    08/17/23 0823   BP: 120/78   BP Location: Left forearm   Patient Position: Sitting   BP Method: Medium (Manual)   Pulse: 96   SpO2: 96%   Weight: 54.5 kg (120 lb 2.4 oz)   Height: 5' 1" (1.549 m)     Body mass index is 22.7 kg/m².      Physical Exam  Vitals and nursing note reviewed.   Constitutional:       Appearance: Normal appearance.   HENT:      Head: Normocephalic.   Eyes:      General:         Right eye: No discharge.         Left eye: No discharge.      Extraocular Movements: Extraocular movements intact.   Cardiovascular:      Rate and Rhythm: Normal rate and regular rhythm.      Heart sounds: Normal heart sounds.   Pulmonary:      Effort: Pulmonary effort is normal.      Breath sounds: Normal breath sounds.   Musculoskeletal:      Comments: Leg brace present on R leg extending from foot to hip   Skin:     Comments: No obvious rash on exposed skin   Neurological:      Mental Status: She is alert.   Psychiatric:         Behavior: Behavior normal.         LABS:   A1C:      CBC:  Recent Labs   Lab 08/11/23  0737   WBC 5.86   RBC 4.79   Hemoglobin 14.2   Hematocrit 42.6   Platelets 223   MCV 89   MCH 29.6   MCHC 33.3     CMP:  Recent Labs   Lab 08/11/23  0737   Glucose 97   Calcium 9.4   Albumin 4.7   Total Protein 8.1   Sodium 141   Potassium 4.0   CO2 25 "   Chloride 104   BUN 16   Creatinine 0.53   Alkaline Phosphatase 54   ALT 29   AST 30   Total Bilirubin 0.5     LIPIDS:  Recent Labs   Lab 08/11/23  0737   TSH 2.160   HDL 44   Cholesterol 149   Triglycerides 101   LDL Cholesterol 84.8   HDL/Cholesterol Ratio 29.5   Non-HDL Cholesterol 105   Total Cholesterol/HDL Ratio 3.4     TSH:  Recent Labs   Lab 08/11/23  0737   TSH 2.160         ASSESSMENT & PLAN: 68 year old female presenting for follow up on chronic conditions    Problem List Items Addressed This Visit          Psychiatric    Opioid dependence, uncomplicated (Chronic)    Overview     Stable. Followed by Pain management            Cardiac/Vascular    Mixed hyperlipidemia (Chronic)    Overview     - well controlled  - continue current medications         Relevant Medications    gemfibroziL (LOPID) 600 MG tablet    rosuvastatin (CRESTOR) 10 MG tablet       Orthopedic    Poliomyelitis osteopathy of multiple sites - Primary    Overview     - secondary to history of polio  - no longer on baclofen  - right ankle AFO orthotic         Relevant Medications    cyclobenzaprine (FLEXERIL) 5 MG tablet       Other    Insomnia (Chronic)    Overview     - acute on chronic issue  - counseling on sleep hygiene  - recommend restart melatonin 6 mg at bedtime  - melatonin and trazodone ineffective  -can take Ambien PRN, not intended for daily/longterm use.  reviewed and appropriate         Relevant Medications    zolpidem (AMBIEN) 5 MG Tab         Follow up in about 6 months (around 2/17/2024) for insomnia f/u.      RTC/ED precautions discussed where applicable.   Encouraged patient to let me know if there are any further questions/concerns.     Advise patient/caretaker to check with insurance regarding orders to avoid unexpected fees/costs.     The patient/caretaker indicates understanding of these issues and agrees with the plan.    I hereby acknowledge that I am relying upon documentation authored by a medical student  working under my supervision and further I hereby attest that I have verified the student documentation or findings by personally re-performing the physical exam and medical decision making activities of the Evaluation and Management service to be billed.  Kimmy Trammell

## 2023-10-23 DIAGNOSIS — K21.9 GASTROESOPHAGEAL REFLUX DISEASE WITHOUT ESOPHAGITIS: ICD-10-CM

## 2023-10-23 RX ORDER — PANTOPRAZOLE SODIUM 40 MG/1
TABLET, DELAYED RELEASE ORAL
Qty: 90 TABLET | Refills: 3 | Status: SHIPPED | OUTPATIENT
Start: 2023-10-23

## 2023-10-23 NOTE — TELEPHONE ENCOUNTER
Refill Routing Note   Medication(s) are not appropriate for processing by [facility] for the following reason(s):      No active prescription written by provider  Responsible provider unclear    [facility] action(s):  Defer Care Due:  None identified            Appointments  past 12m or future 3m with PCP    Date Provider   Last Visit   8/17/2023 Kimmy Trammell MD   Next Visit   2/20/2024 Kimmy Trammell MD   ED visits in past 90 days: 0        Note composed:2:29 PM 10/23/2023

## 2023-10-23 NOTE — TELEPHONE ENCOUNTER
No care due was identified.  [facility] Embedded Care Due Messages. Reference number: [id number].   10/23/2023 11:15:07 AM CDT

## 2023-11-27 DIAGNOSIS — M89.69 POLIOMYELITIS OSTEOPATHY OF MULTIPLE SITES: ICD-10-CM

## 2023-11-27 DIAGNOSIS — B91 POLIOMYELITIS OSTEOPATHY OF MULTIPLE SITES: ICD-10-CM

## 2023-11-27 RX ORDER — CYCLOBENZAPRINE HCL 5 MG
5 TABLET ORAL 3 TIMES DAILY PRN
Qty: 90 TABLET | Refills: 3 | Status: SHIPPED | OUTPATIENT
Start: 2023-11-27

## 2023-11-27 NOTE — TELEPHONE ENCOUNTER
No care due was identified.  [facility] Embedded Care Due Messages. Reference number: [id number].   11/27/2023 9:27:15 AM CST

## 2023-11-27 NOTE — TELEPHONE ENCOUNTER
----- Message from Stefanie Lanier sent at 11/27/2023  9:00 AM CST -----  Type:  RX Refill Request    Who Called:  pt  Refill or New Rx: refill  RX Name and Strength: cyclobenzaprine (FLEXERIL) 5 MG tablet 90 tablet 0    Preferred Pharmacy:    Ordering Provider:  Reach pt via:  Best Call Back Number:   [phone]  Additional Information:

## 2023-12-26 ENCOUNTER — OFFICE VISIT (OUTPATIENT)
Dept: FAMILY MEDICINE | Facility: CLINIC | Age: 68
End: 2023-12-26
Payer: MEDICARE

## 2023-12-26 VITALS
HEART RATE: 90 BPM | DIASTOLIC BLOOD PRESSURE: 74 MMHG | OXYGEN SATURATION: 98 % | SYSTOLIC BLOOD PRESSURE: 130 MMHG | BODY MASS INDEX: 22.95 KG/M2 | HEIGHT: 61 IN | WEIGHT: 121.56 LBS

## 2023-12-26 DIAGNOSIS — R10.2 VULVOVAGINAL DISCOMFORT: Primary | ICD-10-CM

## 2023-12-26 PROCEDURE — 1125F AMNT PAIN NOTED PAIN PRSNT: CPT | Mod: HCNC,CPTII,S$GLB,

## 2023-12-26 PROCEDURE — 3288F FALL RISK ASSESSMENT DOCD: CPT | Mod: HCNC,CPTII,S$GLB,

## 2023-12-26 PROCEDURE — 3008F BODY MASS INDEX DOCD: CPT | Mod: HCNC,CPTII,S$GLB,

## 2023-12-26 PROCEDURE — 3078F PR MOST RECENT DIASTOLIC BLOOD PRESSURE < 80 MM HG: ICD-10-PCS | Mod: HCNC,CPTII,S$GLB,

## 2023-12-26 PROCEDURE — 3075F PR MOST RECENT SYSTOLIC BLOOD PRESS GE 130-139MM HG: ICD-10-PCS | Mod: HCNC,CPTII,S$GLB,

## 2023-12-26 PROCEDURE — 99214 OFFICE O/P EST MOD 30 MIN: CPT | Mod: HCNC,S$GLB,,

## 2023-12-26 PROCEDURE — 1160F RVW MEDS BY RX/DR IN RCRD: CPT | Mod: HCNC,CPTII,S$GLB,

## 2023-12-26 PROCEDURE — 1101F PR PT FALLS ASSESS DOC 0-1 FALLS W/OUT INJ PAST YR: ICD-10-PCS | Mod: HCNC,CPTII,S$GLB,

## 2023-12-26 PROCEDURE — 3288F PR FALLS RISK ASSESSMENT DOCUMENTED: ICD-10-PCS | Mod: HCNC,CPTII,S$GLB,

## 2023-12-26 PROCEDURE — 3078F DIAST BP <80 MM HG: CPT | Mod: HCNC,CPTII,S$GLB,

## 2023-12-26 PROCEDURE — 1160F PR REVIEW ALL MEDS BY PRESCRIBER/CLIN PHARMACIST DOCUMENTED: ICD-10-PCS | Mod: HCNC,CPTII,S$GLB,

## 2023-12-26 PROCEDURE — 1159F PR MEDICATION LIST DOCUMENTED IN MEDICAL RECORD: ICD-10-PCS | Mod: HCNC,CPTII,S$GLB,

## 2023-12-26 PROCEDURE — 1159F MED LIST DOCD IN RCRD: CPT | Mod: HCNC,CPTII,S$GLB,

## 2023-12-26 PROCEDURE — 99999 PR PBB SHADOW E&M-EST. PATIENT-LVL V: CPT | Mod: PBBFAC,HCNC,,

## 2023-12-26 PROCEDURE — 1125F PR PAIN SEVERITY QUANTIFIED, PAIN PRESENT: ICD-10-PCS | Mod: HCNC,CPTII,S$GLB,

## 2023-12-26 PROCEDURE — 3008F PR BODY MASS INDEX (BMI) DOCUMENTED: ICD-10-PCS | Mod: HCNC,CPTII,S$GLB,

## 2023-12-26 PROCEDURE — 1101F PT FALLS ASSESS-DOCD LE1/YR: CPT | Mod: HCNC,CPTII,S$GLB,

## 2023-12-26 PROCEDURE — 99214 PR OFFICE/OUTPT VISIT, EST, LEVL IV, 30-39 MIN: ICD-10-PCS | Mod: HCNC,S$GLB,,

## 2023-12-26 PROCEDURE — 99999 PR PBB SHADOW E&M-EST. PATIENT-LVL V: ICD-10-PCS | Mod: PBBFAC,HCNC,,

## 2023-12-26 PROCEDURE — 3075F SYST BP GE 130 - 139MM HG: CPT | Mod: HCNC,CPTII,S$GLB,

## 2023-12-26 RX ORDER — TRIAMCINOLONE ACETONIDE 1 MG/G
CREAM TOPICAL 2 TIMES DAILY
Qty: 15 G | Refills: 0 | Status: SHIPPED | OUTPATIENT
Start: 2023-12-26 | End: 2024-03-19

## 2023-12-26 NOTE — PROGRESS NOTES
"Subjective:            Chief Complaint: Rash     Julia Fernández is a 68 y.o. female, patient of Kimmy Trammell MD with opioid dependence, COPD, HLD, MVP, lupus anticoagulant syndrome, osteoporosis, GERD, insomina, known to me, presents today with complaints of Rash    Patient reports rash described to be "between the vagina and the butt". Reports its been there for about 4 months now; goes away and returns. Worsened last Friday, was bleeding. Described as "feeling raw" unable to look down there to tell what it looks like. Has tried vagisil and boudreaus butt paste which helps to prevent burning from urination.   Denies drainage, denies itching.   Denies new detergents, denies new soaps or perfumes. Uses regular gain to wash clothes.   Reports stomach bug last week with diarrhea.       Rash  This is a new problem. The current episode started more than 1 month ago (appx 4 months ago). Location: perineal area. The rash is characterized by burning and pain (feels raw). She was exposed to nothing. Treatments tried: Vagisil and latonia's butt paste. The treatment provided mild (helps to coat area to prevent buring while urinating.) relief.       Past Medical History:   Diagnosis Date    Anxiety     Arthritis     Chronic obstructive pulmonary disease 3/6/2020    Clotting disorder     Lupus anticoagulant    GERD (gastroesophageal reflux disease)     High cholesterol     High triglycerides     Lupus     "Lupus anticoagulant"    Mitral valve prolapse     Osteoporosis     Pancreatitis     Polio     9 month old with right leg discrepency and weaknes    Scoliosis        Past Surgical History:   Procedure Laterality Date    ADENOIDECTOMY      APPENDECTOMY       SECTION      x1    CHOLECYSTECTOMY      COLONOSCOPY      ESOPHAGOGASTRODUODENOSCOPY N/A 2020    Procedure: EGD (ESOPHAGOGASTRODUODENOSCOPY);  Surgeon: Lakisha Angel MD;  Location: [facility];  Service: Endoscopy;  Laterality: N/A;    " "EYE SURGERY Right     lower eyelid ("growth removed")    HYSTERECTOMY      endometriosis    TONSILLECTOMY      TUBAL LIGATION         Family History   Problem Relation Age of Onset    Arthritis Mother     Diabetes Mother     Hearing loss Mother     Vision loss Mother     COPD Mother     Depression Mother     Vision loss Father     Cancer Father         Pituitary tumor    Heart disease Father         CABG    Diabetes Sister     Hyperlipidemia Sister     Hypertension Sister     Hearing loss Sister     Drug abuse Brother     Alcohol abuse Brother     Early death Brother     Hypertension Brother     No Known Problems Son        Social History     Socioeconomic History    Marital status:     Number of children: 1   Tobacco Use    Smoking status: Former     Current packs/day: 0.00     Average packs/day: 1 pack/day for 29.0 years (29.0 ttl pk-yrs)     Types: Cigarettes     Start date:      Quit date:      Years since quittin.0     Passive exposure: Past    Smokeless tobacco: Never   Substance and Sexual Activity    Alcohol use: No    Drug use: No    Sexual activity: Not Currently     Partners: Male   Social History Narrative    . Lives with partner Taylor. 1 son. 3 grandchildren (2 girls and 1 boy). Disabled. Home destroyed in [place] which was her mother's home. She now has a mobile home on the property     Social Determinants of Health     Stress: No Stress Concern Present (3/6/2020)    [employer] [place] [employer] - Occupational Stress Questionnaire     Feeling of Stress : Not at all       Review of Systems   Genitourinary:  Negative for dysuria, hematuria, urgency, vaginal bleeding and vaginal discharge.   Skin:  Positive for rash.         Objective:     Vitals:    23 1445 23 1535   BP: (!) 140/70 130/74   BP Location: Left arm Left arm   Patient Position: Sitting    BP Method: Large (Manual)    Pulse: (!) 112 90   SpO2: 98%  " "  Weight: 55.2 kg (121 lb 9.3 oz)    Height: 5' 1" (1.549 m)           Physical Exam  Exam conducted with a chaperone present.   Constitutional:       Appearance: Normal appearance. She is well-developed and well-groomed.   HENT:      Head: Normocephalic and atraumatic.   Cardiovascular:      Rate and Rhythm: Normal rate and regular rhythm.      Heart sounds: Normal heart sounds.   Pulmonary:      Effort: Pulmonary effort is normal.      Breath sounds: Normal breath sounds.   Genitourinary:         Comments: Skin to anus on right side appears to be irritated, resembles a skin tear as if patient had been scratching. No swelling or rash noted.   Musculoskeletal:      Comments: Uses cane    Skin:     General: Skin is warm and dry.      Capillary Refill: Capillary refill takes less than 2 seconds.   Neurological:      General: No focal deficit present.      Mental Status: She is alert and oriented to person, place, and time.   Psychiatric:         Attention and Perception: Attention normal.         Mood and Affect: Mood normal.         Speech: Speech normal.         Behavior: Behavior is cooperative.           Assessment:         ICD-10-CM ICD-9-CM   1. Vulvovaginal discomfort  R10.2 625.9       Plan:       Vulvovaginal discomfort  -     Ambulatory referral/consult to Gynecology; Future; Expected date: 01/02/2024  -     triamcinolone acetonide 0.1% (KENALOG) 0.1 % cream; Apply topically 2 (two) times daily. Apply to affected area 1-2 times per day for 7 days  Dispense: 15 g; Refill: 0  Patient has not seen GYN in a while. Refer to GYN for further evaluation as symptoms present for months.   Triamcinolone to affected areas only.     Patient instructed on the following:  Keep area clean and dry.  Continue to use latonia's butt paste as needed.   Apply triamcinolone cream to right side of butt and area between butt and vagina 1-2 times per day.       If symptoms worsen, go to ER.  If symptoms do not improve, return to " clinic.   Keep appointments with all specialists.     Patient verbalizes understanding and agrees with current treatment plan.      Follow up if symptoms worsen or fail to improve.     Patient's Medications   New Prescriptions    TRIAMCINOLONE ACETONIDE 0.1% (KENALOG) 0.1 % CREAM    Apply topically 2 (two) times daily. Apply to affected area 1-2 times per day for 7 days   Previous Medications    ASPIRIN (ECOTRIN) 81 MG EC TABLET    Take 81 mg by mouth once daily.    CALCIUM CARBONATE-VITAMIN D3 600 MG-20 MCG (800 UNIT) CHEW    Take by mouth 2 (two) times daily.    CYCLOBENZAPRINE (FLEXERIL) 5 MG TABLET    Take 1 tablet (5 mg total) by mouth 3 (three) times daily as needed for Muscle spasms.    GEMFIBROZIL (LOPID) 600 MG TABLET    Take 1 tablet (600 mg total) by mouth 2 (two) times daily before meals.    HYDROCODONE-ACETAMINOPHEN 10-325MG (NORCO)  MG TAB    Take by mouth 2 (two) times daily as needed.    HYDROXYZINE HCL (ATARAX) 25 MG TABLET    Take 1 tablet (25 mg total) by mouth 3 (three) times daily as needed for Anxiety.    PANTOPRAZOLE (PROTONIX) 40 MG TABLET    TAKE 1 TABLET EVERY DAY    POLYETHYLENE GLYCOL (GLYCOLAX) 17 GRAM PWPK    Take by mouth.    ROSUVASTATIN (CRESTOR) 10 MG TABLET    Take 1 tablet (10 mg total) by mouth once daily.    ZOLPIDEM (AMBIEN) 5 MG TAB    Take 1 tablet (5 mg total) by mouth nightly as needed (sleep).   Modified Medications    No medications on file   Discontinued Medications    No medications on file         Lucía Minor NP

## 2023-12-26 NOTE — PATIENT INSTRUCTIONS
Keep area clean and dry.  Continue to use latonia's butt paste as needed.   Apply triamcinolone cream to right side of butt and area between butt and vagina 1-2 times per day.   Someone will call you to get an appt with gyn. Please ask them to put you on the waiting list.  If symptoms worsen, go to ED.   If symptoms do not improve, please notify us.

## 2024-02-06 ENCOUNTER — PATIENT OUTREACH (OUTPATIENT)
Dept: ADMINISTRATIVE | Facility: HOSPITAL | Age: 69
End: 2024-02-06
Payer: MEDICARE

## 2024-02-06 NOTE — PROGRESS NOTES
Care Everywhere updates requested and reviewed.  Immunizations reconciled. Media reports reviewed.  Duplicate HM overrides and  orders removed.  Overdue HM topic chart audit and/or requested.  Overdue lab testing linked to upcoming lab appointments if applies.        Health Maintenance Due   Topic Date Due    TETANUS VACCINE  Never done    Shingles Vaccine (1 of 2) Never done    RSV Vaccine (Age 60+ and Pregnant patients) (1 - 1-dose 60+ series) Never done    COVID-19 Vaccine (2023-24 season) 2023    Colorectal Cancer Screening  2024

## 2024-02-07 ENCOUNTER — OFFICE VISIT (OUTPATIENT)
Dept: OBSTETRICS AND GYNECOLOGY | Facility: CLINIC | Age: 69
End: 2024-02-07
Payer: MEDICARE

## 2024-02-07 VITALS — WEIGHT: 122.81 LBS | DIASTOLIC BLOOD PRESSURE: 71 MMHG | SYSTOLIC BLOOD PRESSURE: 118 MMHG | BODY MASS INDEX: 23.2 KG/M2

## 2024-02-07 DIAGNOSIS — R10.2 VULVOVAGINAL DISCOMFORT: ICD-10-CM

## 2024-02-07 DIAGNOSIS — L90.0 LICHEN SCLEROSUS: Primary | ICD-10-CM

## 2024-02-07 PROCEDURE — 1126F AMNT PAIN NOTED NONE PRSNT: CPT | Mod: HCNC,CPTII,S$GLB, | Performed by: STUDENT IN AN ORGANIZED HEALTH CARE EDUCATION/TRAINING PROGRAM

## 2024-02-07 PROCEDURE — 1159F MED LIST DOCD IN RCRD: CPT | Mod: HCNC,CPTII,S$GLB, | Performed by: STUDENT IN AN ORGANIZED HEALTH CARE EDUCATION/TRAINING PROGRAM

## 2024-02-07 PROCEDURE — 3078F DIAST BP <80 MM HG: CPT | Mod: HCNC,CPTII,S$GLB, | Performed by: STUDENT IN AN ORGANIZED HEALTH CARE EDUCATION/TRAINING PROGRAM

## 2024-02-07 PROCEDURE — 99213 OFFICE O/P EST LOW 20 MIN: CPT | Mod: HCNC,S$GLB,, | Performed by: STUDENT IN AN ORGANIZED HEALTH CARE EDUCATION/TRAINING PROGRAM

## 2024-02-07 PROCEDURE — 99999 PR PBB SHADOW E&M-EST. PATIENT-LVL III: CPT | Mod: PBBFAC,HCNC,, | Performed by: STUDENT IN AN ORGANIZED HEALTH CARE EDUCATION/TRAINING PROGRAM

## 2024-02-07 PROCEDURE — 3074F SYST BP LT 130 MM HG: CPT | Mod: HCNC,CPTII,S$GLB, | Performed by: STUDENT IN AN ORGANIZED HEALTH CARE EDUCATION/TRAINING PROGRAM

## 2024-02-07 PROCEDURE — 3008F BODY MASS INDEX DOCD: CPT | Mod: HCNC,CPTII,S$GLB, | Performed by: STUDENT IN AN ORGANIZED HEALTH CARE EDUCATION/TRAINING PROGRAM

## 2024-02-07 RX ORDER — CLOBETASOL PROPIONATE 0.5 MG/G
OINTMENT TOPICAL 2 TIMES DAILY
Qty: 30 G | Refills: 0 | Status: SHIPPED | OUTPATIENT
Start: 2024-02-07 | End: 2024-02-07

## 2024-02-07 RX ORDER — CLOBETASOL PROPIONATE 0.5 MG/G
OINTMENT TOPICAL 2 TIMES DAILY
Qty: 30 G | Refills: 0 | Status: SHIPPED | OUTPATIENT
Start: 2024-02-07

## 2024-02-07 NOTE — PROGRESS NOTES
"CC: perineal irritation       HPI:  Julia Fernández is a 68 y.o. female  presents with complaint of perineal irritation. First noticed maybe in August, thought it was just from sitting long hours while fishing. Irritation located in area between vaginal opening and rectum. Saw PCP in December, started on triamcinolone and that has help the skin heal. Occasional bleeding when the skin is cracked, no itching. No vaginal discharge    ROS:  GENERAL: No fever, chills, fatigability or weight loss.  VULVAR: no itching.  VAGINAL: No relaxation, no itching, no discharge, no abnormal bleeding and no lesions.  ABDOMEN: No abdominal pain. Denies nausea. Denies vomiting. No diarrhea. No constipation  BREAST: Denies pain. No lumps. No discharge.  URINARY: No incontinence, no nocturia, no frequency and no dysuria.  CARDIOVASCULAR: No chest pain. No shortness of breath. No leg cramps.  NEUROLOGICAL: No headaches. No vision changes.      Patient History:  Past Medical History:   Diagnosis Date    Anxiety     Arthritis     Chronic obstructive pulmonary disease 3/6/2020    Clotting disorder     Lupus anticoagulant    GERD (gastroesophageal reflux disease)     High cholesterol     High triglycerides     Lupus     "Lupus anticoagulant"    Mitral valve prolapse     Osteoporosis     Pancreatitis     Polio     9 month old with right leg discrepency and weaknes    Scoliosis      Past Surgical History:   Procedure Laterality Date    ADENOIDECTOMY      APPENDECTOMY       SECTION      x1    CHOLECYSTECTOMY      COLONOSCOPY      ESOPHAGOGASTRODUODENOSCOPY N/A 2020    Procedure: EGD (ESOPHAGOGASTRODUODENOSCOPY);  Surgeon: Lakisha Angel MD;  Location: [facility];  Service: Endoscopy;  Laterality: N/A;    EYE SURGERY Right     lower eyelid ("growth removed")    HYSTERECTOMY      endometriosis    TONSILLECTOMY      TUBAL LIGATION       Social History     Tobacco Use    Smoking status: Former     Current packs/day: 0.00 "     Average packs/day: 1 pack/day for 29.0 years (29.0 ttl pk-yrs)     Types: Cigarettes     Start date:      Quit date:      Years since quittin.1     Passive exposure: Past    Smokeless tobacco: Never   Substance Use Topics    Alcohol use: No    Drug use: No     Family History   Problem Relation Age of Onset    Arthritis Mother     Diabetes Mother     Hearing loss Mother     Vision loss Mother     COPD Mother     Depression Mother     Vision loss Father     Cancer Father         Pituitary tumor    Heart disease Father         CABG    Diabetes Sister     Hyperlipidemia Sister     Hypertension Sister     Hearing loss Sister     Drug abuse Brother     Alcohol abuse Brother     Early death Brother     Hypertension Brother     No Known Problems Son      OB History    Para Term  AB Living   1 1 1         SAB IAB Ectopic Multiple Live Births                  # Outcome Date GA Lbr Heriberto/2nd Weight Sex Delivery Anes PTL Lv   1 Term                Objective:   /71   Wt 55.7 kg (122 lb 12.7 oz)   BMI 23.20 kg/m²   No LMP recorded. Patient has had a hysterectomy.      PHYSICAL EXAM:  APPEARANCE: Well nourished, well developed, in no acute distress.  AFFECT: WNL, alert and oriented x 3  SKIN: No acne or hirsutism  NECK: Neck symmetric without masses or thyromegaly  CHEST: Good respiratory effect  PELVIC: Normal appearing postmenopausal atrophy of labia, vagina normal. White skin changes bilaterally inner labia minor to posterior perineum extending bilaterally to rectum consistent with lichen sclerosus.  EXTREMITIES: No edema.      ASSESSMENT and PLAN:    ICD-10-CM ICD-9-CM    1. Lichen sclerosus  L90.0 701.0 clobetasol 0.05% (TEMOVATE) 0.05 % Oint      DISCONTINUED: clobetasol 0.05% (TEMOVATE) 0.05 % Oint      2. Vulvovaginal discomfort  R10.2 625.9 Ambulatory referral/consult to Gynecology          Lichen sclerosus   - discussed exam findings, diagnosis, chronic nature of condition with  patient   - will change steroid to high potency clobetasol, rx sent to pharmacy   - discussed application for at least 2 weeks then twice weekly after that if skin has healed   - biopsy if    Follow up: as needed if symptoms worsen or 3-6 months for skin check       Stephanie Heaney, MD OBGYN [facility]

## 2024-02-20 ENCOUNTER — OFFICE VISIT (OUTPATIENT)
Dept: FAMILY MEDICINE | Facility: CLINIC | Age: 69
End: 2024-02-20
Payer: MEDICARE

## 2024-02-20 ENCOUNTER — TELEPHONE (OUTPATIENT)
Dept: GASTROENTEROLOGY | Facility: CLINIC | Age: 69
End: 2024-02-20
Payer: MEDICARE

## 2024-02-20 VITALS
WEIGHT: 123.13 LBS | HEART RATE: 117 BPM | BODY MASS INDEX: 23.25 KG/M2 | HEIGHT: 61 IN | OXYGEN SATURATION: 96 % | DIASTOLIC BLOOD PRESSURE: 70 MMHG | SYSTOLIC BLOOD PRESSURE: 120 MMHG

## 2024-02-20 DIAGNOSIS — F11.20 OPIOID DEPENDENCE, UNCOMPLICATED: ICD-10-CM

## 2024-02-20 DIAGNOSIS — B91 POLIOMYELITIS OSTEOPATHY OF MULTIPLE SITES: ICD-10-CM

## 2024-02-20 DIAGNOSIS — Z13.6 ENCOUNTER FOR LIPID SCREENING FOR CARDIOVASCULAR DISEASE: ICD-10-CM

## 2024-02-20 DIAGNOSIS — Z12.11 COLON CANCER SCREENING: ICD-10-CM

## 2024-02-20 DIAGNOSIS — J44.9 CHRONIC OBSTRUCTIVE PULMONARY DISEASE, UNSPECIFIED COPD TYPE: ICD-10-CM

## 2024-02-20 DIAGNOSIS — G14 POSTPOLIO SYNDROME: Primary | ICD-10-CM

## 2024-02-20 DIAGNOSIS — M89.69 POLIOMYELITIS OSTEOPATHY OF MULTIPLE SITES: ICD-10-CM

## 2024-02-20 DIAGNOSIS — I70.0 ATHEROSCLEROSIS OF AORTA: ICD-10-CM

## 2024-02-20 DIAGNOSIS — Z13.220 ENCOUNTER FOR LIPID SCREENING FOR CARDIOVASCULAR DISEASE: ICD-10-CM

## 2024-02-20 DIAGNOSIS — D68.62 LUPUS ANTICOAGULANT SYNDROME: ICD-10-CM

## 2024-02-20 PROBLEM — G12.9 SPINAL MUSCULAR ATROPHY, UNSPECIFIED: Status: ACTIVE | Noted: 2024-02-20

## 2024-02-20 PROCEDURE — 3288F FALL RISK ASSESSMENT DOCD: CPT | Mod: HCNC,CPTII,S$GLB, | Performed by: FAMILY MEDICINE

## 2024-02-20 PROCEDURE — 99999 PR PBB SHADOW E&M-EST. PATIENT-LVL III: CPT | Mod: PBBFAC,HCNC,, | Performed by: FAMILY MEDICINE

## 2024-02-20 PROCEDURE — 1160F RVW MEDS BY RX/DR IN RCRD: CPT | Mod: HCNC,CPTII,S$GLB, | Performed by: FAMILY MEDICINE

## 2024-02-20 PROCEDURE — 1101F PT FALLS ASSESS-DOCD LE1/YR: CPT | Mod: HCNC,CPTII,S$GLB, | Performed by: FAMILY MEDICINE

## 2024-02-20 PROCEDURE — 1159F MED LIST DOCD IN RCRD: CPT | Mod: HCNC,CPTII,S$GLB, | Performed by: FAMILY MEDICINE

## 2024-02-20 PROCEDURE — 99214 OFFICE O/P EST MOD 30 MIN: CPT | Mod: HCNC,S$GLB,, | Performed by: FAMILY MEDICINE

## 2024-02-20 PROCEDURE — 1126F AMNT PAIN NOTED NONE PRSNT: CPT | Mod: HCNC,CPTII,S$GLB, | Performed by: FAMILY MEDICINE

## 2024-02-20 PROCEDURE — 3008F BODY MASS INDEX DOCD: CPT | Mod: HCNC,CPTII,S$GLB, | Performed by: FAMILY MEDICINE

## 2024-02-20 PROCEDURE — 3078F DIAST BP <80 MM HG: CPT | Mod: HCNC,CPTII,S$GLB, | Performed by: FAMILY MEDICINE

## 2024-02-20 PROCEDURE — 3074F SYST BP LT 130 MM HG: CPT | Mod: HCNC,CPTII,S$GLB, | Performed by: FAMILY MEDICINE

## 2024-02-20 RX ORDER — INFLUENZA A VIRUS A/VICTORIA/4897/2022 IVR-238 (H1N1) ANTIGEN (FORMALDEHYDE INACTIVATED), INFLUENZA A VIRUS A/DARWIN/9/2021 SAN-010 (H3N2) ANTIGEN (FORMALDEHYDE INACTIVATED), INFLUENZA B VIRUS B/PHUKET/3073/2013 ANTIGEN (FORMALDEHYDE INACTIVATED), AND INFLUENZA B VIRUS B/MICHIGAN/01/2021 ANTIGEN (FORMALDEHYDE INACTIVATED) 60; 60; 60; 60 UG/.7ML; UG/.7ML; UG/.7ML; UG/.7ML
INJECTION, SUSPENSION INTRAMUSCULAR
COMMUNITY
Start: 2023-10-26 | End: 2024-03-04

## 2024-02-20 NOTE — PROGRESS NOTES
"PATIENT VISIT FAMILY MEDICINE    CC:   Chief Complaint   Patient presents with    Follow-up       HPI: Julia Fernández  is a 68 y.o. female:    Patient is known to me.  Patient presents routine follow up on chronic conditions    Patient doing well overall, taking medications as prescribed and with no acute concerns.       PMHX:   Past Medical History:   Diagnosis Date    Anxiety     Arthritis     Chronic obstructive pulmonary disease 3/6/2020    Clotting disorder     Lupus anticoagulant    GERD (gastroesophageal reflux disease)     High cholesterol     High triglycerides     Lupus     "Lupus anticoagulant"    Mitral valve prolapse     Osteoporosis     Pancreatitis     Polio     9 month old with right leg discrepency and weaknes    Scoliosis        PSHX:   Past Surgical History:   Procedure Laterality Date    ADENOIDECTOMY      APPENDECTOMY       SECTION      x1    CHOLECYSTECTOMY      COLONOSCOPY      ESOPHAGOGASTRODUODENOSCOPY N/A 2020    Procedure: EGD (ESOPHAGOGASTRODUODENOSCOPY);  Surgeon: Lakisha Angel MD;  Location: [facility];  Service: Endoscopy;  Laterality: N/A;    EYE SURGERY Right     lower eyelid ("growth removed")    HYSTERECTOMY      endometriosis    TONSILLECTOMY      TUBAL LIGATION         FAMHX:   Family History   Problem Relation Age of Onset    Arthritis Mother     Diabetes Mother     Hearing loss Mother     Vision loss Mother     COPD Mother     Depression Mother     Vision loss Father     Cancer Father         Pituitary tumor    Heart disease Father         CABG    Diabetes Sister     Hyperlipidemia Sister     Hypertension Sister     Hearing loss Sister     Drug abuse Brother     Alcohol abuse Brother     Early death Brother     Hypertension Brother     No Known Problems Son        SOCHX:   Social History     Socioeconomic History    Marital status:     Number of children: 1   Tobacco Use    Smoking status: Former     Current packs/day: 0.00     Average packs/day: 1 " pack/day for 29.0 years (29.0 ttl pk-yrs)     Types: Cigarettes     Start date:      Quit date:      Years since quittin.1     Passive exposure: Past    Smokeless tobacco: Never   Substance and Sexual Activity    Alcohol use: No    Drug use: No    Sexual activity: Not Currently     Partners: Male   Social History Narrative    . Lives with partner Taylor. 1 son. 3 grandchildren (2 girls and 1 boy). Disabled. Home destroyed in [place] which was her mother's home. She now has a mobile home on the property     Social Determinants of Health     Stress: No Stress Concern Present (3/6/2020)    [employer] - Occupational Stress Questionnaire     Feeling of Stress : Not at all       ALLERGIES:   Review of patient's allergies indicates:   Allergen Reactions    Gabapentin Other (See Comments)     dizzy    Prevnar 13 [pneumoc 13-bethany conj-dip cr(pf)] Swelling and Rash       MEDS:   Current Outpatient Medications:     aspirin (ECOTRIN) 81 MG EC tablet, Take 81 mg by mouth once daily., Disp: , Rfl:     calcium carbonate-vitamin D3 600 mg-20 mcg (800 unit) Chew, Take by mouth 2 (two) times daily., Disp: , Rfl:     clobetasol 0.05% (TEMOVATE) 0.05 % Oint, Apply topically 2 (two) times daily., Disp: 30 g, Rfl: 0    cyclobenzaprine (FLEXERIL) 5 MG tablet, Take 1 tablet (5 mg total) by mouth 3 (three) times daily as needed for Muscle spasms., Disp: 90 tablet, Rfl: 3    FLUZONE HIGHDOSE QUAD 23-24  mcg/0.7 mL Syrg, , Disp: , Rfl:     gemfibroziL (LOPID) 600 MG tablet, Take 1 tablet (600 mg total) by mouth 2 (two) times daily before meals., Disp: 180 tablet, Rfl: 3    hydrocodone-acetaminophen 10-325mg (NORCO)  mg Tab, Take by mouth 2 (two) times daily as needed., Disp: , Rfl:     hydrOXYzine HCL (ATARAX) 25 MG tablet, Take 1 tablet (25 mg total) by mouth 3 (three) times daily as needed for Anxiety., Disp: 180 tablet, Rfl: 3    pantoprazole (PROTONIX) 40 MG tablet, TAKE  "1 TABLET EVERY DAY, Disp: 90 tablet, Rfl: 3    polyethylene glycol (GLYCOLAX) 17 gram PwPk, Take by mouth., Disp: , Rfl:     rosuvastatin (CRESTOR) 10 MG tablet, Take 1 tablet (10 mg total) by mouth once daily., Disp: 90 tablet, Rfl: 3    triamcinolone acetonide 0.1% (KENALOG) 0.1 % cream, Apply topically 2 (two) times daily. Apply to affected area 1-2 times per day for 7 days, Disp: 15 g, Rfl: 0    zolpidem (AMBIEN) 5 MG Tab, Take 1 tablet (5 mg total) by mouth nightly as needed (sleep)., Disp: 30 tablet, Rfl: 5    OBJECTIVE:   Vitals:    02/20/24 1311   BP: 120/70   BP Location: Left arm   Patient Position: Sitting   BP Method: Large (Manual)   Pulse: (!) 117   SpO2: 96%   Weight: 55.8 kg (123 lb 2 oz)   Height: 5' 1" (1.549 m)     Body mass index is 23.26 kg/m².      Physical Exam  Vitals and nursing note reviewed.   Constitutional:       Appearance: Normal appearance.   HENT:      Head: Normocephalic.      Right Ear: Tympanic membrane normal.      Left Ear: Tympanic membrane normal.      Mouth/Throat:      Pharynx: Oropharynx is clear. No oropharyngeal exudate or posterior oropharyngeal erythema.   Eyes:      General:         Right eye: No discharge.         Left eye: No discharge.      Extraocular Movements: Extraocular movements intact.   Cardiovascular:      Rate and Rhythm: Normal rate and regular rhythm.      Heart sounds: Normal heart sounds.   Pulmonary:      Effort: Pulmonary effort is normal.      Breath sounds: Normal breath sounds.   Abdominal:      General: Bowel sounds are normal. There is no distension.      Palpations: Abdomen is soft. There is no mass.      Tenderness: There is no abdominal tenderness. There is no guarding or rebound.      Hernia: No hernia is present.   Musculoskeletal:      Comments: Walking with cane  Brace on RLE   Skin:     Comments: No obvious rash on exposed skin   Neurological:      Mental Status: She is alert.   Psychiatric:         Behavior: Behavior normal. "           LABS:   A1C:      CBC:  Recent Labs   Lab 08/11/23  0737   WBC 5.86   RBC 4.79   Hemoglobin 14.2   Hematocrit 42.6   Platelets 223   MCV 89   MCH 29.6   MCHC 33.3     CMP:  Recent Labs   Lab 08/11/23  0737   Glucose 97   Calcium 9.4   Albumin 4.7   Total Protein 8.1   Sodium 141   Potassium 4.0   CO2 25   Chloride 104   BUN 16   Creatinine 0.53   Alkaline Phosphatase 54   ALT 29   AST 30   Total Bilirubin 0.5     LIPIDS:  Recent Labs   Lab 08/11/23  0737   TSH 2.160   HDL 44   Cholesterol 149   Triglycerides 101   LDL Cholesterol 84.8   HDL/Cholesterol Ratio 29.5   Non-HDL Cholesterol 105   Total Cholesterol/HDL Ratio 3.4     TSH:  Recent Labs   Lab 08/11/23  0737   TSH 2.160         ASSESSMENT & PLAN:    Problem List Items Addressed This Visit          Neuro    Postpolio syndrome - Primary    Overview     - secondary to history of polio  - no longer on baclofen  - right ankle AFO orthotic         Relevant Orders    CBC Auto Differential    Comprehensive Metabolic Panel       Psychiatric    Opioid dependence, uncomplicated (Chronic)    Overview     Stable. Followed by Pain management         Relevant Orders    CBC Auto Differential       Pulmonary    COPD (chronic obstructive pulmonary disease) (Chronic)    Overview     - 5/27/2019 PFT: moderate obstruction  - 06/29/2020 CT chest without contrast:  Mild emphysematous changes  - evaluated by Dr. Park [facility]  - asymptomatic         Relevant Orders    CBC Auto Differential       Cardiac/Vascular    Atherosclerosis of aorta (Chronic)    Overview     - goal LDL <70 on statin  - BP goal < 130/80  - ASA 81 mg daily           Relevant Orders    CBC Auto Differential    Comprehensive Metabolic Panel       Hematology    Lupus anticoagulant syndrome (Chronic)    Overview     - without h/o DVT/PE  - evaluated by Rheumatology Dr. Stafford   - lupus anticoagulant testing negative and reports Rheumatology recommends to monitor yearly         Relevant Orders    CBC Auto  Differential    LUPUS ANTICOAGULANT (DRVVT)       Orthopedic    Poliomyelitis osteopathy of multiple sites    Overview     - secondary to history of polio  - no longer on baclofen  - right ankle AFO orthotic         Relevant Orders    CBC Auto Differential     Other Visit Diagnoses       Colon cancer screening        Relevant Orders    Case Request Endoscopy: COLONOSCOPY (Completed)    Encounter for lipid screening for cardiovascular disease        Relevant Orders    Lipid Panel              Follow up in about 6 months (around 8/20/2024) for insomnia.      RTC/ED precautions discussed where applicable.   Encouraged patient to let me know if there are any further questions/concerns.     Advise patient/caretaker to check with insurance regarding orders to avoid unexpected fees/costs.     The patient/caretaker indicates understanding of these issues and agrees with the plan.    Dr. Kimmy Trammell MD  Family Medicine

## 2024-02-20 NOTE — LETTER
February 20, 2024    Julia Fernández  [address]  [place] [address]             [facility] Gastroenterology  [address]  [facility] [address]  Phone: [phone]  Fax: [phone] Dear MsMaria Guadalupe Jolene:    CLENPIQ Instructions    You are scheduled for a colonoscopy with Dr. Angel on 3/7/2024 at [facility]. Enter through the [facility] Entrance and check in at Same Day Surgery.  To ensure that your test is accurate and complete, you MUST follow these instructions listed below.  If you have any questions, please call our office at [phone].  Plan on being at the hospital for your procedure for 3-4 hours.       IF YOU HAVE ANY QUESTIONS ABOUT YOUR ARRIVAL TIME YOU CAN CALL [phone].    1.  Follow a CLEAR LIQUID DIET for the entire day before your scheduled colonoscopy.  This means no solid food the entire day starting when you wake.  You may have as much of the clear liquids as you want throughout the day.   CLEAR LIQUID DIET:      - NO DAIRY   - You can have:  Coffee with sugar (no creamer), tea, water, soda, apple or white grape juice, chicken or beef broth/bouillon (no meat, noodles, or veggies), popsicles, Jell-O, lemonade.    2.  AT 5 pm the evening before your colonoscopy, OPEN ONE (1) BOTTLE OF CLENPIQ AND DRINK THE ENTIRE BOTTLE.  DRINK FIVE (5) 8-OUNCE GLASSES OF WATER (40 OUNCES TOTAL) OVER THE NEXT FIVE (5) HOURS.     3.  The endoscopy department will call you 1 day before your colonoscopy to tell you the exact time to arrive, AND to tell you the exact time to drink the 2nd portion of your prep (which will be FIVE HOURS BEFORE YOUR ARRIVAL TIME).  At this time given to you, OPEN THE OTHER ONE (1) BOTTLE OF CLENPIQ AND DRINK THE ENTIRE BOTTLE.  DRINK THREE (3) 8-OUNCE GLASSES OF WATER (24 OUNCES TOTAL) OVER THE NEXT THREE (3) HOURS. Once this is complete, you can not have anything else by mouth!    4.  You must have someone with you to DRIVE YOU  HOME since you will be receiving IV sedation for the colonoscopy.    5.  It is ok to take MOST of your REGULAR MEDICATIONS  in the morning of your test with a SIP of water.  THE ONLY MEDS YOU NEED TO HOLD ARE YOUR DIABETES MEDICATIONS,  SOME BLOOD PRESSURE MEDS, AND BLOOD THINNERS IF OK'D BY YOUR DOCTOR.  Do NOT have anything else to eat or drink the morning of your colonoscopy.  It is ok to brush your teeth.    6.  If you are on blood thinners THAT YOU HAVE BEEN INSTRUCTED TO HOLD BY YOUR DOCTOR FOR THIS PROCEDURE, then do NOT take this the morning of your colonoscopy.  Do NOT stop these medications on your own, they must be approved to be held by your doctor.  Your colonoscopy can NOT be done if you are on these medications.  Examples of blood thinners include: Coumadin, Aggrenox, Plavix, Pradaxa, Reapro, Pletal, Xarelto, Ticagrelor, Brilinta, Eliquis, and high dose aspirin (325 mg).  You do not have to stop baby aspirin 81 mg.    7.  IF YOU ARE DIABETIC:  NO INSULIN OR ORAL MEDICATIONS THE MORNING OF THE COLONOSCOPY.  TAKE ONLY HALF THE DOSE OF YOUR INSULIN THE DAY BEFORE THE COLONOSCOPY.  DO NOT TAKE ANY ORAL DIABETIC MEDICATIONS THE DAY BEFORE THE COLONOSCOPY.  IF YOU ARE AN INSULIN DEPENDENT DIABETIC WITH UNSTABLE BLOOD SUGARS, NOTIFY YOUR PRIMARY CARE PHYSICIAN FOR INSTRUCTIONS.    8.  Please DO use your inhalers the morning of your procedure.       If you have any questions or concerns, please don't hesitate to call.    Sincerely,        Tiffanie Apple MA

## 2024-02-21 RX ORDER — SODIUM PICOSULFATE, MAGNESIUM OXIDE, AND ANHYDROUS CITRIC ACID 12; 3.5; 1 G/175ML; G/175ML; MG/175ML
1 LIQUID ORAL ONCE
Qty: 175 ML | Refills: 0 | Status: SHIPPED | OUTPATIENT
Start: 2024-02-21 | End: 2024-02-21

## 2024-03-06 ENCOUNTER — TELEPHONE (OUTPATIENT)
Dept: GASTROENTEROLOGY | Facility: CLINIC | Age: 69
End: 2024-03-06
Payer: MEDICARE

## 2024-03-06 NOTE — TELEPHONE ENCOUNTER
instructed pt arrival time of 0600 in SDS. informed pt 1st prep due @5pm. 2nd prep due @0100. confirmed pt on clear liquids. pt voiced understanding.

## 2024-03-13 ENCOUNTER — OFFICE VISIT (OUTPATIENT)
Dept: OBSTETRICS AND GYNECOLOGY | Facility: CLINIC | Age: 69
End: 2024-03-13
Payer: MEDICARE

## 2024-03-13 VITALS
WEIGHT: 119.69 LBS | DIASTOLIC BLOOD PRESSURE: 87 MMHG | BODY MASS INDEX: 22.62 KG/M2 | SYSTOLIC BLOOD PRESSURE: 148 MMHG

## 2024-03-13 DIAGNOSIS — B02.9 THIGH SHINGLES: ICD-10-CM

## 2024-03-13 DIAGNOSIS — N90.89 LABIAL LESION: Primary | ICD-10-CM

## 2024-03-13 PROCEDURE — 3079F DIAST BP 80-89 MM HG: CPT | Mod: HCNC,CPTII,S$GLB, | Performed by: STUDENT IN AN ORGANIZED HEALTH CARE EDUCATION/TRAINING PROGRAM

## 2024-03-13 PROCEDURE — 99213 OFFICE O/P EST LOW 20 MIN: CPT | Mod: HCNC,S$GLB,, | Performed by: STUDENT IN AN ORGANIZED HEALTH CARE EDUCATION/TRAINING PROGRAM

## 2024-03-13 PROCEDURE — 1159F MED LIST DOCD IN RCRD: CPT | Mod: HCNC,CPTII,S$GLB, | Performed by: STUDENT IN AN ORGANIZED HEALTH CARE EDUCATION/TRAINING PROGRAM

## 2024-03-13 PROCEDURE — 3008F BODY MASS INDEX DOCD: CPT | Mod: HCNC,CPTII,S$GLB, | Performed by: STUDENT IN AN ORGANIZED HEALTH CARE EDUCATION/TRAINING PROGRAM

## 2024-03-13 PROCEDURE — 99999 PR PBB SHADOW E&M-EST. PATIENT-LVL III: CPT | Mod: PBBFAC,HCNC,, | Performed by: STUDENT IN AN ORGANIZED HEALTH CARE EDUCATION/TRAINING PROGRAM

## 2024-03-13 PROCEDURE — 3077F SYST BP >= 140 MM HG: CPT | Mod: HCNC,CPTII,S$GLB, | Performed by: STUDENT IN AN ORGANIZED HEALTH CARE EDUCATION/TRAINING PROGRAM

## 2024-03-18 ENCOUNTER — NURSE TRIAGE (OUTPATIENT)
Dept: ADMINISTRATIVE | Facility: CLINIC | Age: 69
End: 2024-03-18
Payer: MEDICARE

## 2024-03-18 NOTE — TELEPHONE ENCOUNTER
"Spoke with pt who reports that her BP has been elevated during the last few visits providers office States just took BP and it was noted to be 133/93 . Pt reports she does have HA at this time, and reports mild SOB,and feels like her heart is beating fast. Pt. Advised to be seen in ED. pt states she would like to have appointment instead. Advised will send message to office. She verbalized understanding.      Reason for Disposition   Extra heartbeats, irregular heart beating, or heart is beating very fast (i.e., 'palpitations')    Additional Information   Negative: SEVERE difficulty breathing (e.g., struggling for each breath, speaks in single words, pulse > 120)   Negative: Breathing stopped and hasn't returned   Negative: Choking on something   Negative: Bluish (or gray) lips or face   Negative: Difficult to awaken or acting confused (e.g., disoriented, slurred speech)   Negative: Passed out (i.e., fainted, collapsed and was not responding)   Negative: Wheezing started suddenly after medicine, an allergic food, or bee sting   Negative: Stridor (harsh sound while breathing in)   Negative: Slow, shallow and weak breathing   Negative: Sounds like a life-threatening emergency to the triager   Negative: MODERATE difficulty breathing (e.g., speaks in phrases, SOB even at rest, pulse 100-120) of new-onset or worse than normal   Negative: Oxygen level (e.g., pulse oximetry) 90% or lower   Negative: Wheezing can be heard across the room   Negative: Drooling or spitting out saliva (because can't swallow)   Negative: Any history of prior "blood clot" in leg or lungs   Negative: Illness requiring prolonged bedrest in past month (e.g., immobilization, long hospital stay)   Negative: Hip or leg fracture (broken bone) in past month (or had cast on leg or ankle in past month)   Negative: Major surgery in the past month   Negative: Long-distance travel in past month (e.g., car, bus, train, plane; with trip lasting 6 or more " hours)   Negative: Cancer treatment in past six months (or has cancer now)    Protocols used: Breathing Difficulty-A-[place]

## 2024-03-19 ENCOUNTER — OFFICE VISIT (OUTPATIENT)
Dept: FAMILY MEDICINE | Facility: CLINIC | Age: 69
End: 2024-03-19
Payer: MEDICARE

## 2024-03-19 VITALS
WEIGHT: 119.94 LBS | DIASTOLIC BLOOD PRESSURE: 70 MMHG | SYSTOLIC BLOOD PRESSURE: 120 MMHG | OXYGEN SATURATION: 95 % | HEART RATE: 99 BPM | HEIGHT: 61 IN | BODY MASS INDEX: 22.64 KG/M2

## 2024-03-19 DIAGNOSIS — G47.00 INSOMNIA, UNSPECIFIED TYPE: Chronic | ICD-10-CM

## 2024-03-19 DIAGNOSIS — R03.0 ELEVATED BLOOD PRESSURE READING IN OFFICE WITHOUT DIAGNOSIS OF HYPERTENSION: Primary | ICD-10-CM

## 2024-03-19 DIAGNOSIS — F41.0 PANIC ATTACK: ICD-10-CM

## 2024-03-19 PROCEDURE — 1101F PT FALLS ASSESS-DOCD LE1/YR: CPT | Mod: HCNC,CPTII,S$GLB,

## 2024-03-19 PROCEDURE — 99999 PR PBB SHADOW E&M-EST. PATIENT-LVL V: CPT | Mod: PBBFAC,HCNC,,

## 2024-03-19 PROCEDURE — 3008F BODY MASS INDEX DOCD: CPT | Mod: HCNC,CPTII,S$GLB,

## 2024-03-19 PROCEDURE — 1160F RVW MEDS BY RX/DR IN RCRD: CPT | Mod: HCNC,CPTII,S$GLB,

## 2024-03-19 PROCEDURE — 1126F AMNT PAIN NOTED NONE PRSNT: CPT | Mod: HCNC,CPTII,S$GLB,

## 2024-03-19 PROCEDURE — 3288F FALL RISK ASSESSMENT DOCD: CPT | Mod: HCNC,CPTII,S$GLB,

## 2024-03-19 PROCEDURE — 99214 OFFICE O/P EST MOD 30 MIN: CPT | Mod: HCNC,S$GLB,,

## 2024-03-19 PROCEDURE — 3078F DIAST BP <80 MM HG: CPT | Mod: HCNC,CPTII,S$GLB,

## 2024-03-19 PROCEDURE — 1159F MED LIST DOCD IN RCRD: CPT | Mod: HCNC,CPTII,S$GLB,

## 2024-03-19 PROCEDURE — 3074F SYST BP LT 130 MM HG: CPT | Mod: HCNC,CPTII,S$GLB,

## 2024-03-19 NOTE — PROGRESS NOTES
"Subjective:            Chief Complaint: Follow-up  HPI   Julia Fernández is a 68 y.o. female, patient of Kimmy Trammell MD with opioid dependence, COPD, HLD, MVP, lupus anticoagulant syndrome, osteoporosis, GERD known to me, presents today for a BP  Follow-up    Here today to check on her BP. States on , she started keeping track of her BP which was 148/86. Reports she felt like when her pressure was up, she could feel a pressure in her head and felt like her heart was racing. States she had a lot going on with a sore on her vaginal area, had to get a colonoscopy, and had the shingles 2 weeks ago.   Was checking BP a few times per week, all at different times of the day, ranging from 119/80s-174/103. BP machine is 8 or 9 years old. See attached log. The 174/103 was taken on  at 4 pm. States she was feeling really nervous and jittery so she took her BP. Has hx of anxiety, was taking hydroxyzine which made her feel off balance so she quit taking it. Has been trying to control anxiety by taking deep breaths.     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    Scoliosis        Past Surgical History:   Procedure Laterality Date    ADENOIDECTOMY      APPENDECTOMY       SECTION      x1    CHOLECYSTECTOMY      COLONOSCOPY      COLONOSCOPY N/A 3/7/2024    Procedure: COLONOSCOPY;  Surgeon: Lakisha Angel MD;  Location: [facility];  Service: Endoscopy;  Laterality: N/A;    ESOPHAGOGASTRODUODENOSCOPY N/A 2020    Procedure: EGD (ESOPHAGOGASTRODUODENOSCOPY);  Surgeon: Lakisah Angel MD;  Location: [facility];  Service: Endoscopy;  " "Laterality: N/A;    EYE SURGERY Right     lower eyelid ("growth removed")    HYSTERECTOMY      endometriosis    TONSILLECTOMY      TUBAL LIGATION         Family History   Problem Relation Age of Onset    Arthritis Mother     Diabetes Mother     Hearing loss Mother     Vision loss Mother     COPD Mother     Depression Mother     Vision loss Father     Cancer Father         Pituitary tumor    Heart disease Father         CABG    Diabetes Sister     Hyperlipidemia Sister     Hypertension Sister     Hearing loss Sister     Drug abuse Brother     Alcohol abuse Brother     Early death Brother     Hypertension Brother     No Known Problems Son        Social History     Socioeconomic History    Marital status:     Number of children: 1   Tobacco Use    Smoking status: Former     Current packs/day: 0.00     Average packs/day: 1 pack/day for 29.0 years (29.0 ttl pk-yrs)     Types: Cigarettes     Start date:      Quit date:      Years since quittin.2     Passive exposure: Past    Smokeless tobacco: Never   Substance and Sexual Activity    Alcohol use: No    Drug use: No    Sexual activity: Not Currently     Partners: Male   Social History Narrative    . Lives with partner Taylor. 1 son. 3 grandchildren (2 girls and 1 boy). Disabled. Home [place] in [place] which was her mother's home. She now has a mobile home on the property     Social Determinants of Health     Stress: No Stress Concern Present (3/6/2020)    [employer] - Occupational Stress Questionnaire     Feeling of Stress : Not at all       Review of Systems   Respiratory:  Negative for cough and shortness of breath.    Cardiovascular:  Negative for chest pain.   Neurological:  Negative for dizziness and numbness.   Psychiatric/Behavioral:  The patient is nervous/anxious.          Objective:     Vitals:    24 1105   BP: 120/70   BP Location: Left arm   Patient Position: Sitting   BP Method: " "Small (Manual)   Pulse: 99   SpO2: 95%   Weight: 54.4 kg (119 lb 14.9 oz)   Height: 5' 1" (1.549 m)          Physical Exam  Vitals reviewed.   Constitutional:       Appearance: Normal appearance. She is well-developed and well-groomed.   HENT:      Head: Normocephalic and atraumatic.   Cardiovascular:      Rate and Rhythm: Normal rate and regular rhythm.      Heart sounds: Normal heart sounds.   Pulmonary:      Effort: Pulmonary effort is normal.      Breath sounds: Normal breath sounds.   Musculoskeletal:         General: Normal range of motion.      Cervical back: Normal range of motion.      Right lower leg: No edema.      Left lower leg: No edema.   Skin:     General: Skin is warm and dry.      Capillary Refill: Capillary refill takes less than 2 seconds.   Neurological:      General: No focal deficit present.      Mental Status: She is alert and oriented to person, place, and time.   Psychiatric:         Attention and Perception: Attention normal.         Mood and Affect: Mood normal.         Speech: Speech normal.         Behavior: Behavior is cooperative.                 Laboratory:  CBC:  No results for input(s): "WBC", "RBC", "HGB", "HCT", "PLT", "MCV", "MCH", "MCHC" in the last 2160 hours.  CMP:  No results for input(s): "GLU", "CALCIUM", "ALBUMIN", "PROT", "NA", "K", "CO2", "CL", "BUN", "ALKPHOS", "ALT", "AST", "BILITOT" in the last 2160 hours.    Invalid input(s): "CREATININ"  URINALYSIS:  No results for input(s): "COLORU", "CLARITYU", "SPECGRAV", "PHUR", "PROTEINUA", "GLUCOSEU", "BILIRUBINCON", "BLOODU", "WBCU", "RBCU", "BACTERIA", "MUCUS", "NITRITE", "LEUKOCYTESUR", "UROBILINOGEN", "HYALINECASTS" in the last 2160 hours.   LIPIDS:  No results for input(s): "TSH", "HDL", "CHOL", "TRIG", "LDLCALC", "CHOLHDL", "NONHDLCHOL", "TOTALCHOLEST" in the last 2160 hours.  TSH:  No results for input(s): "TSH" in the last 2160 hours.  A1C:  No results for input(s): "HGBA1C" in the last 2160 hours.    Assessment:     "     ICD-10-CM ICD-9-CM   1. Elevated blood pressure reading in office without diagnosis of hypertension  R03.0 796.2   2. Panic attack  F41.0 300.01   3. Insomnia, unspecified type  G47.00 780.52       Plan:       Elevated blood pressure reading in office without diagnosis of hypertension  BP today in office 120/70 manually, 124/84 with patient's home BP machine.   BP during GYN visit last week was 148/87.  Patient currently checks BP at different times of day and also checks when she is having an anxiety attack.  Instructed to check BP daily at the same time each day. Specific directions provided.   RTC in 1 month with BP log, if home BP consistently >140/90, will start a low dose medication.     Panic attack  CHRIS-7 score 1, PHQ-9 score 1; however, Patient states she is nervous all the time. Discussed starting SSRI; however, patient declines at this time.   Encouraged to work on managing anxiety with walks, deep breathing exercises, and music. Follow up as needed.    Insomnia, unspecified type  Controlled with zolpidem prn.       If symptoms worsen, go to ER.  If symptoms do not improve, return to clinic.   Keep appointments with all specialists.     Patient verbalizes understanding and agrees with current treatment plan.      Follow up in about 1 month (around 4/19/2024) for BP f/u .     Patient's Medications   New Prescriptions    No medications on file   Previous Medications    ASPIRIN (ECOTRIN) 81 MG EC TABLET    Take 81 mg by mouth once daily.    CALCIUM CARBONATE-VITAMIN D3 600 MG-20 MCG (800 UNIT) TAB    Take 1 tablet by mouth 2 (two) times a day.    CLOBETASOL 0.05% (TEMOVATE) 0.05 % OINT    Apply topically 2 (two) times daily.    CYCLOBENZAPRINE (FLEXERIL) 5 MG TABLET    Take 1 tablet (5 mg total) by mouth 3 (three) times daily as needed for Muscle spasms.    GEMFIBROZIL (LOPID) 600 MG TABLET    Take 1 tablet (600 mg total) by mouth 2 (two) times daily before meals.    HYDROCODONE-ACETAMINOPHEN 10-325MG  (NORCO)  MG TAB    Take 1 tablet by mouth 2 (two) times daily as needed for Pain.    MELATONIN 1 MG TBDL    Take 1 tablet by mouth nightly.    PANTOPRAZOLE (PROTONIX) 40 MG TABLET    TAKE 1 TABLET EVERY DAY    POLYETHYLENE GLYCOL (GLYCOLAX) 17 GRAM PWPK    Take 17 g by mouth once daily.    ROSUVASTATIN (CRESTOR) 10 MG TABLET    Take 1 tablet (10 mg total) by mouth once daily.    ZOLPIDEM (AMBIEN) 5 MG TAB    Take 1 tablet (5 mg total) by mouth nightly as needed (sleep).   Modified Medications    No medications on file   Discontinued Medications    HYDROXYZINE HCL (ATARAX) 25 MG TABLET    Take 1 tablet (25 mg total) by mouth 3 (three) times daily as needed for Anxiety.    TRIAMCINOLONE ACETONIDE 0.1% (KENALOG) 0.1 % CREAM    Apply topically 2 (two) times daily. Apply to affected area 1-2 times per day for 7 days         Lucía Minor NP

## 2024-03-19 NOTE — PATIENT INSTRUCTIONS
Keep a blood pressure log over the next 4 weeks. Your goal blood pressure is less than 140/90              -check it around the same time each day              -make sure you are resting for 5 minutes prior to checking   -make sure your bladder is empty   -make sure you are in a relaxed area, back is up against the chair, arm up on the table              -be seated with your legs uncrossed              -I prefer a upper arm cuff instead of a wrist cuff because it tends to be more accurate  Return to clinic in 1 month with BP log. If BP still elevated, will start on a low dose of medication.     Work on ways to manage anxiety. Deep breathing, walks, music.

## 2024-04-01 NOTE — TELEPHONE ENCOUNTER
Pt informed    Referring Physician: Dr. Kimmy Trammell                              Date: 2/20/2024    Reason for Referral: Family history of colon polyps      Family History of:   Colon polyp: yes  Relationship/Age of Onset: Mother/ 60's      Colon cancer: no  Relationship/Age of Onset:       Patient with:   Hemoccults Done:       Iron deficient:   no      On Blood Thinner: no      Valvular heart disease/valve replacement: no      Anemia Present: no      On NSAID: no    On Adipex or phentermine:no     On Ozempic:no     Lung disease: no      Kidney disease: no     Hx of Crohn's or Ulcerative colitis:no     Hx of polyps:       Hx of colon cancer:       Previous colon evalations: yes  When: 12/20/2010  Where: Dr. Garner  Pertinent symptoms:           Review of patient's allergies indicates: Gabapentin and Prevnar 13 injection        Patient was scheduled for colonoscopy on  3/7/2024 with Dr. Angel at [facility].       instructions were reviewed with patient.         Prep sent to [facility] in [facility] Instructions    You are scheduled for a colonoscopy with Dr. Angel on 3/7/2024 at [facility]. Enter through the [facility] and check in at [facility] Day Surgery.  To ensure that your test is accurate and complete, you MUST follow these instructions listed below.  If you have any questions, please call our office at [phone].  Plan on being at the hospital for your procedure for 3-4 hours.       IF YOU HAVE ANY QUESTIONS ABOUT YOUR ARRIVAL TIME YOU CAN CALL [phone].    1.  Follow a CLEAR LIQUID DIET for the entire day before your scheduled colonoscopy.  This means no solid food the entire day starting when you wake.  You may have as much of the clear liquids as you want throughout the day.   CLEAR LIQUID DIET:      - NO DAIRY   - You can have:  Coffee with sugar (no creamer), tea, water, soda, apple or white grape juice, chicken or beef broth/bouillon (no meat, noodles, or veggies), popsicles, Jell-O,  lemonade.    2.  AT 5 pm the evening before your colonoscopy, OPEN ONE (1) BOTTLE OF CLENPIQ AND DRINK THE ENTIRE BOTTLE.  DRINK FIVE (5) 8-OUNCE GLASSES OF WATER (40 OUNCES TOTAL) OVER THE NEXT FIVE (5) HOURS.     3.  The endoscopy department will call you 1 day before your colonoscopy to tell you the exact time to arrive, AND to tell you the exact time to drink the 2nd portion of your prep (which will be FIVE HOURS BEFORE YOUR ARRIVAL TIME).  At this time given to you, OPEN THE OTHER ONE (1) BOTTLE OF CLENPIQ AND DRINK THE ENTIRE BOTTLE.  DRINK THREE (3) 8-OUNCE GLASSES OF WATER (24 OUNCES TOTAL) OVER THE NEXT THREE (3) HOURS. Once this is complete, you can not have anything else by mouth!    4.  You must have someone with you to DRIVE YOU HOME since you will be receiving IV sedation for the colonoscopy.    5.  It is ok to take MOST of your REGULAR MEDICATIONS  in the morning of your test with a SIP of water.  THE ONLY MEDS YOU NEED TO HOLD ARE YOUR DIABETES MEDICATIONS,  SOME BLOOD PRESSURE MEDS, AND BLOOD THINNERS IF OK'D BY YOUR DOCTOR.  Do NOT have anything else to eat or drink the morning of your colonoscopy.  It is ok to brush your teeth.    6.  If you are on blood thinners THAT YOU HAVE BEEN INSTRUCTED TO HOLD BY YOUR DOCTOR FOR THIS PROCEDURE, then do NOT take this the morning of your colonoscopy.  Do NOT stop these medications on your own, they must be approved to be held by your doctor.  Your colonoscopy can NOT be done if you are on these medications.  Examples of blood thinners include: Coumadin, Aggrenox, Plavix, Pradaxa, Reapro, Pletal, Xarelto, Ticagrelor, Brilinta, Eliquis, and high dose aspirin (325 mg).  You do not have to stop baby aspirin 81 mg.    7.  IF YOU ARE DIABETIC:  NO INSULIN OR ORAL MEDICATIONS THE MORNING OF THE COLONOSCOPY.  TAKE ONLY HALF THE DOSE OF YOUR INSULIN THE DAY BEFORE THE COLONOSCOPY.  DO NOT TAKE ANY ORAL DIABETIC MEDICATIONS THE DAY BEFORE THE COLONOSCOPY.  IF YOU ARE  AN INSULIN DEPENDENT DIABETIC WITH UNSTABLE BLOOD SUGARS, NOTIFY YOUR PRIMARY CARE PHYSICIAN FOR INSTRUCTIONS.    8.  Please DO use your inhalers the morning of your procedure.

## 2024-04-18 NOTE — PROGRESS NOTES
"Subjective:            Chief Complaint: Follow-up and Hypertension  HPI   Julia Fernández is a 69 y.o. female, patient of Kimmy Trammell MD with opioid dependence, COPD, HLD, MVP, lupus anticoagulant syndrome, osteoporosis, GERD, known to me, presents today for a 1 month Follow-up and Hypertension  Overall feeling well. No acute concerns.    Does not have a dx of htn. BP was elevated at home and at a Dr. Appointment prior. Checked BP daily for the past month. Ranging from 116/76-130s/70s. See attached photo.   Needs refill on ambien.         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    Scoliosis        Past Surgical History:   Procedure Laterality Date    ADENOIDECTOMY      APPENDECTOMY       SECTION      x1    CHOLECYSTECTOMY      COLONOSCOPY      COLONOSCOPY N/A 3/7/2024    Procedure: COLONOSCOPY;  Surgeon: Lakisha Angel MD;  Location: [facility];  Service: Endoscopy;  Laterality: N/A;    ESOPHAGOGASTRODUODENOSCOPY N/A 2020    Procedure: EGD (ESOPHAGOGASTRODUODENOSCOPY);  Surgeon: Lakisha Angel MD;  Location: [facility];  Service: Endoscopy;  Laterality: N/A;    EYE SURGERY Right     lower eyelid ("growth removed")    HYSTERECTOMY      endometriosis    TONSILLECTOMY      TUBAL LIGATION         Family History   Problem Relation Name Age of Onset    Arthritis Mother      Diabetes Mother      Hearing loss Mother      Vision loss Mother      COPD Mother      Depression Mother      Vision loss Father      Cancer Father          Pituitary tumor    Heart disease Father          CABG    Diabetes Sister      Hyperlipidemia Sister      " "Hypertension Sister      Hearing loss Sister      Drug abuse Brother      Alcohol abuse Brother      Early death Brother      Hypertension Brother      No Known Problems Son         Social History     Socioeconomic History    Marital status:     Number of children: 1   Tobacco Use    Smoking status: Former     Current packs/day: 0.00     Average packs/day: 1 pack/day for 29.0 years (29.0 ttl pk-yrs)     Types: Cigarettes     Start date:      Quit date:      Years since quittin.3     Passive exposure: Past    Smokeless tobacco: Never   Substance and Sexual Activity    Alcohol use: No    Drug use: No    Sexual activity: Not Currently     Partners: Male   Social History Narrative    . Lives with partner Taylor. 1 son. 3 grandchildren (2 girls and 1 boy). Disabled. Home destroyed in [place] which was her mother's home. She now has a mobile home on the property     Social Determinants of Health     Stress: No Stress Concern Present (3/6/2020)    [employer] - Occupational Stress Questionnaire     Feeling of Stress : Not at all       Review of Systems   Respiratory:  Negative for cough and shortness of breath.    Cardiovascular:  Negative for chest pain.   Gastrointestinal:  Negative for constipation, diarrhea, nausea and vomiting.   Musculoskeletal:  Negative for arthralgias.   Neurological:  Negative for dizziness.         Objective:     Vitals:    24 0851   BP: 128/68   BP Location: Left arm   Patient Position: Sitting   BP Method: Medium (Manual)   Pulse: 109   SpO2: 97%   Weight: 54.2 kg (119 lb 6.1 oz)   Height: 5' 1" (1.549 m)          Physical Exam  Vitals reviewed.   Constitutional:       Appearance: Normal appearance. She is well-developed and well-groomed.   HENT:      Head: Normocephalic and atraumatic.   Cardiovascular:      Rate and Rhythm: Normal rate and regular rhythm.      Heart sounds: Normal heart sounds.   Pulmonary:      Effort: " "Pulmonary effort is normal.      Breath sounds: Normal breath sounds.   Musculoskeletal:      Right lower leg: No edema.      Left lower leg: No edema.   Skin:     General: Skin is warm and dry.      Capillary Refill: Capillary refill takes less than 2 seconds.   Neurological:      General: No focal deficit present.      Mental Status: She is alert and oriented to person, place, and time.   Psychiatric:         Speech: Speech normal.         Behavior: Behavior is cooperative.           Laboratory:  CBC:  No results for input(s): "WBC", "RBC", "HGB", "HCT", "PLT", "MCV", "MCH", "MCHC" in the last 2160 hours.  CMP:  No results for input(s): "GLU", "CALCIUM", "ALBUMIN", "PROT", "NA", "K", "CO2", "CL", "BUN", "ALKPHOS", "ALT", "AST", "BILITOT" in the last 2160 hours.    Invalid input(s): "CREATININ"  URINALYSIS:  No results for input(s): "COLORU", "CLARITYU", "SPECGRAV", "PHUR", "PROTEINUA", "GLUCOSEU", "BILIRUBINCON", "BLOODU", "WBCU", "RBCU", "BACTERIA", "MUCUS", "NITRITE", "LEUKOCYTESUR", "UROBILINOGEN", "HYALINECASTS" in the last 2160 hours.   LIPIDS:  No results for input(s): "TSH", "HDL", "CHOL", "TRIG", "LDLCALC", "CHOLHDL", "NONHDLCHOL", "TOTALCHOLEST" in the last 2160 hours.  TSH:  No results for input(s): "TSH" in the last 2160 hours.  A1C:  No results for input(s): "HGBA1C" in the last 2160 hours.    Assessment:         ICD-10-CM ICD-9-CM   1. Elevated blood pressure reading in office without diagnosis of hypertension  R03.0 796.2   2. Insomnia, unspecified type  G47.00 780.52       Plan:       Elevated blood pressure reading in office without diagnosis of hypertension  BP at home and in clinic <140/90, ranging 116/76-130s/70s. No intervention needed.   Continue to work on diet modification and exercise as tolerated.     Insomnia, unspecified type  -     zolpidem (AMBIEN) 5 MG Tab; Take 1 tablet (5 mg total) by mouth nightly as needed (sleep).  Dispense: 30 tablet; Refill: 5  Controlled with zolpidem.  " reviewed and appropriate. Refilled as requested.       If symptoms worsen, go to ER.  If symptoms do not improve, return to clinic.   Keep appointments with all specialists.     Patient verbalizes understanding and agrees with current treatment plan.      Follow up in about 5 months (around 9/19/2024).     Patient's Medications   New Prescriptions    No medications on file   Previous Medications    ASPIRIN (ECOTRIN) 81 MG EC TABLET    Take 81 mg by mouth once daily.    CALCIUM CARBONATE-VITAMIN D3 600 MG-20 MCG (800 UNIT) TAB    Take 1 tablet by mouth 2 (two) times a day.    CLOBETASOL 0.05% (TEMOVATE) 0.05 % OINT    Apply topically 2 (two) times daily.    CYCLOBENZAPRINE (FLEXERIL) 5 MG TABLET    Take 1 tablet (5 mg total) by mouth 3 (three) times daily as needed for Muscle spasms.    GEMFIBROZIL (LOPID) 600 MG TABLET    Take 1 tablet (600 mg total) by mouth 2 (two) times daily before meals.    HYDROCODONE-ACETAMINOPHEN 10-325MG (NORCO)  MG TAB    Take 1 tablet by mouth 2 (two) times daily as needed for Pain.    MELATONIN 1 MG TBDL    Take 1 tablet by mouth nightly.    PANTOPRAZOLE (PROTONIX) 40 MG TABLET    TAKE 1 TABLET EVERY DAY    POLYETHYLENE GLYCOL (GLYCOLAX) 17 GRAM PWPK    Take 17 g by mouth once daily.    ROSUVASTATIN (CRESTOR) 10 MG TABLET    Take 1 tablet (10 mg total) by mouth once daily.   Modified Medications    Modified Medication Previous Medication    ZOLPIDEM (AMBIEN) 5 MG TAB zolpidem (AMBIEN) 5 MG Tab       Take 1 tablet (5 mg total) by mouth nightly as needed (sleep).    Take 1 tablet (5 mg total) by mouth nightly as needed (sleep).   Discontinued Medications    No medications on file         Lucía Minor NP

## 2024-04-19 ENCOUNTER — OFFICE VISIT (OUTPATIENT)
Dept: FAMILY MEDICINE | Facility: CLINIC | Age: 69
End: 2024-04-19
Payer: MEDICARE

## 2024-04-19 VITALS
HEIGHT: 61 IN | SYSTOLIC BLOOD PRESSURE: 128 MMHG | OXYGEN SATURATION: 97 % | WEIGHT: 119.38 LBS | DIASTOLIC BLOOD PRESSURE: 68 MMHG | HEART RATE: 109 BPM | BODY MASS INDEX: 22.54 KG/M2

## 2024-04-19 DIAGNOSIS — G47.00 INSOMNIA, UNSPECIFIED TYPE: Chronic | ICD-10-CM

## 2024-04-19 DIAGNOSIS — R03.0 ELEVATED BLOOD PRESSURE READING IN OFFICE WITHOUT DIAGNOSIS OF HYPERTENSION: Primary | ICD-10-CM

## 2024-04-19 PROCEDURE — 1160F RVW MEDS BY RX/DR IN RCRD: CPT | Mod: CPTII,S$GLB,,

## 2024-04-19 PROCEDURE — 99214 OFFICE O/P EST MOD 30 MIN: CPT | Mod: S$GLB,,,

## 2024-04-19 PROCEDURE — 3288F FALL RISK ASSESSMENT DOCD: CPT | Mod: CPTII,S$GLB,,

## 2024-04-19 PROCEDURE — 1159F MED LIST DOCD IN RCRD: CPT | Mod: CPTII,S$GLB,,

## 2024-04-19 PROCEDURE — 1101F PT FALLS ASSESS-DOCD LE1/YR: CPT | Mod: CPTII,S$GLB,,

## 2024-04-19 PROCEDURE — 3078F DIAST BP <80 MM HG: CPT | Mod: CPTII,S$GLB,,

## 2024-04-19 PROCEDURE — 3008F BODY MASS INDEX DOCD: CPT | Mod: CPTII,S$GLB,,

## 2024-04-19 PROCEDURE — 3074F SYST BP LT 130 MM HG: CPT | Mod: CPTII,S$GLB,,

## 2024-04-19 PROCEDURE — 99999 PR PBB SHADOW E&M-EST. PATIENT-LVL IV: CPT | Mod: PBBFAC,,,

## 2024-04-19 PROCEDURE — 1126F AMNT PAIN NOTED NONE PRSNT: CPT | Mod: CPTII,S$GLB,,

## 2024-04-19 RX ORDER — ZOLPIDEM TARTRATE 5 MG/1
5 TABLET ORAL NIGHTLY PRN
Qty: 30 TABLET | Refills: 5 | Status: SHIPPED | OUTPATIENT
Start: 2024-04-19 | End: 2024-10-18

## 2024-05-24 ENCOUNTER — PATIENT OUTREACH (OUTPATIENT)
Dept: ADMINISTRATIVE | Facility: HOSPITAL | Age: 69
End: 2024-05-24
Payer: MEDICARE

## 2024-05-24 DIAGNOSIS — Z12.31 ENCOUNTER FOR SCREENING MAMMOGRAM FOR MALIGNANT NEOPLASM OF BREAST: Primary | ICD-10-CM

## 2024-05-24 NOTE — PROGRESS NOTES
Population Health Chart Review & Patient Outreach Details      Additional [facility] Health Notes:               Updates Requested / Reviewed:      Updated Care Coordination Note, Care Everywhere, Care Team Updated, and Immunizations Reconciliation Completed or Queried: [facility] Topics Overdue:      [facility] Score: 0     Patient is not due for any topics at this time.    Tetanus Vaccine  Shingles/Zoster Vaccine  RSV Vaccine                  Health Maintenance Topic(s) Outreach Outcomes & Actions Taken:    Breast Cancer Screening - Outreach Outcomes & Actions Taken  : Mammogram Screening Scheduled

## 2024-06-06 DIAGNOSIS — B91 POLIOMYELITIS OSTEOPATHY OF MULTIPLE SITES: ICD-10-CM

## 2024-06-06 DIAGNOSIS — M89.69 POLIOMYELITIS OSTEOPATHY OF MULTIPLE SITES: ICD-10-CM

## 2024-06-06 RX ORDER — CYCLOBENZAPRINE HCL 5 MG
5 TABLET ORAL 3 TIMES DAILY PRN
Qty: 90 TABLET | Refills: 3 | Status: SHIPPED | OUTPATIENT
Start: 2024-06-06

## 2024-06-06 NOTE — TELEPHONE ENCOUNTER
Care Due:                  Date            Visit Type   Department     Provider  --------------------------------------------------------------------------------                                EP -                              PRIMARY      [facility]  Last Visit: 02-      CARE ([facility])   [facility]  Val[place]                               -                              PRIMARY      [facility]  Next Visit: 09-      CARE ([facility])   [facility]                                                            Last  Test          Frequency    Reason                     Performed    Due Date  --------------------------------------------------------------------------------    CBC.........  12 months..  gemfibroziL..............  08- 08-    CMP.........  12 months..  gemfibroziL, rosuvastatin  08- 08-    Lipid Panel.  12 months..  gemfibroziL, rosuvastatin  08- 08-    Health [facility] Embedded Care Due Messages. Reference number: [id number].   6/06/2024 2:58:41 PM CDT

## 2024-06-06 NOTE — TELEPHONE ENCOUNTER
----- Message from Rianna Escalera sent at 6/6/2024  2:49 PM CDT -----  Type:  RX Refill Request    Who Called: Pt   Refill or New Rx:refill   RX Name and Strength:cyclobenzaprine (FLEXERIL) 5 MG tablet  How is the patient currently taking it? (ex. 1XDay):30  Is this a 30 day or 90 day RX:90  Preferred Pharmacy with phone number:[facility] [address]   Phone: [phone]  Fax: [phone]  Would the patient rather a call back or a response via [facility]? Call back   Best Call Back Number:[phone]  Additional Information:

## 2024-08-15 DIAGNOSIS — E78.2 MIXED HYPERLIPIDEMIA: ICD-10-CM

## 2024-08-15 RX ORDER — GEMFIBROZIL 600 MG/1
600 TABLET, FILM COATED ORAL
Qty: 180 TABLET | Refills: 3 | Status: SHIPPED | OUTPATIENT
Start: 2024-08-15

## 2024-08-15 RX ORDER — ROSUVASTATIN CALCIUM 10 MG/1
10 TABLET, COATED ORAL NIGHTLY
Qty: 90 TABLET | Refills: 3 | Status: SHIPPED | OUTPATIENT
Start: 2024-08-15

## 2024-08-15 NOTE — TELEPHONE ENCOUNTER
Care Due:                  Date            Visit Type   Department     Provider  --------------------------------------------------------------------------------                                EP -                              PRIMARY      [facility]  Last Visit: 02-      CARE ([facility])   [facility]                               -                              PRIMARY      [facility]  Next Visit: 09-      [facility] ([facility])   [facility]                                                            Last  Test          Frequency    Reason                     Performed    Due Date  --------------------------------------------------------------------------------    CBC.........  12 months..  gemfibroziL..............  08- 08-    Lipid Panel.  12 months..  gemfibroziL, rosuvastatin  08- 08-    [facility] Embedded Care Due Messages. Reference number: [id number].   8/15/2024 9:11:12 AM CDT

## 2024-08-15 NOTE — TELEPHONE ENCOUNTER
----- Message from Shana Rivera sent at 8/15/2024  9:06 AM CDT -----  Contact: pt  Type:  RX Refill Request    Who Called: pt   Refill or New Rx:refill  RX Name and Strength:rosuvastatin (CRESTOR) 10 MG tablet and gemfibroziL (LOPID) 600 MG tablet  Preferred Pharmacy with phone number:[facility] - [place], [place] - [address]  [address] [address]  Phone: [phone] Fax: [phone]  Local or Mail Order:mail  Ordering Provider:Valeri   Would the patient rather a call back or a response via [facility]? call  Best Call Back Number:[phone]  Additional Information:

## 2024-08-15 NOTE — TELEPHONE ENCOUNTER
----- Message from Shana Rivera sent at 8/15/2024  9:06 AM CDT -----  Contact: pt  Type:  RX Refill Request    Who Called: pt   Refill or New Rx:refill  RX Name and Strength:rosuvastatin (CRESTOR) 10 MG tablet and gemfibroziL (LOPID) 600 MG tablet  Preferred Pharmacy with phone number:[facility] - [place], [place] - [address]  [address] [address]  Phone: [phone] Fax: [phone]  Local or Mail Order:mail  Ordering Provider:Valeri   Would the patient rather a call back or a response via [facility]? call  Best Call Back Number:[phone]  Additional Information:

## 2024-09-04 ENCOUNTER — OFFICE VISIT (OUTPATIENT)
Dept: FAMILY MEDICINE | Facility: CLINIC | Age: 69
End: 2024-09-04
Payer: MEDICARE

## 2024-09-04 VITALS
BODY MASS INDEX: 22.37 KG/M2 | OXYGEN SATURATION: 96 % | DIASTOLIC BLOOD PRESSURE: 78 MMHG | SYSTOLIC BLOOD PRESSURE: 134 MMHG | HEART RATE: 111 BPM | WEIGHT: 118.5 LBS | HEIGHT: 61 IN

## 2024-09-04 DIAGNOSIS — Z78.0 POSTMENOPAUSAL: ICD-10-CM

## 2024-09-04 DIAGNOSIS — D68.62 LUPUS ANTICOAGULANT SYNDROME: Chronic | ICD-10-CM

## 2024-09-04 DIAGNOSIS — R22.1 NECK SWELLING: Primary | ICD-10-CM

## 2024-09-04 DIAGNOSIS — R10.9 RIGHT LATERAL ABDOMINAL PAIN: ICD-10-CM

## 2024-09-04 DIAGNOSIS — M81.6 LOCALIZED OSTEOPOROSIS WITHOUT CURRENT PATHOLOGICAL FRACTURE: Chronic | ICD-10-CM

## 2024-09-04 DIAGNOSIS — F11.20 OPIOID DEPENDENCE, UNCOMPLICATED: Chronic | ICD-10-CM

## 2024-09-04 DIAGNOSIS — E04.2 MULTIPLE THYROID NODULES: Chronic | ICD-10-CM

## 2024-09-04 DIAGNOSIS — G47.00 INSOMNIA, UNSPECIFIED TYPE: Chronic | ICD-10-CM

## 2024-09-04 DIAGNOSIS — I70.0 ATHEROSCLEROSIS OF AORTA: Chronic | ICD-10-CM

## 2024-09-04 DIAGNOSIS — E78.2 MIXED HYPERLIPIDEMIA: Chronic | ICD-10-CM

## 2024-09-04 DIAGNOSIS — J44.9 CHRONIC OBSTRUCTIVE PULMONARY DISEASE, UNSPECIFIED COPD TYPE: Chronic | ICD-10-CM

## 2024-09-04 DIAGNOSIS — G14 POSTPOLIO SYNDROME: Chronic | ICD-10-CM

## 2024-09-04 PROCEDURE — 99214 OFFICE O/P EST MOD 30 MIN: CPT | Mod: S$GLB,,, | Performed by: FAMILY MEDICINE

## 2024-09-04 PROCEDURE — G2211 COMPLEX E/M VISIT ADD ON: HCPCS | Mod: S$GLB,,, | Performed by: FAMILY MEDICINE

## 2024-09-04 PROCEDURE — 1159F MED LIST DOCD IN RCRD: CPT | Mod: CPTII,S$GLB,, | Performed by: FAMILY MEDICINE

## 2024-09-04 PROCEDURE — 3075F SYST BP GE 130 - 139MM HG: CPT | Mod: CPTII,S$GLB,, | Performed by: FAMILY MEDICINE

## 2024-09-04 PROCEDURE — 3078F DIAST BP <80 MM HG: CPT | Mod: CPTII,S$GLB,, | Performed by: FAMILY MEDICINE

## 2024-09-04 PROCEDURE — 1160F RVW MEDS BY RX/DR IN RCRD: CPT | Mod: CPTII,S$GLB,, | Performed by: FAMILY MEDICINE

## 2024-09-04 PROCEDURE — 1125F AMNT PAIN NOTED PAIN PRSNT: CPT | Mod: CPTII,S$GLB,, | Performed by: FAMILY MEDICINE

## 2024-09-04 PROCEDURE — 3288F FALL RISK ASSESSMENT DOCD: CPT | Mod: CPTII,S$GLB,, | Performed by: FAMILY MEDICINE

## 2024-09-04 PROCEDURE — 99999 PR PBB SHADOW E&M-EST. PATIENT-LVL IV: CPT | Mod: PBBFAC,HCNC,, | Performed by: FAMILY MEDICINE

## 2024-09-04 PROCEDURE — 1101F PT FALLS ASSESS-DOCD LE1/YR: CPT | Mod: CPTII,S$GLB,, | Performed by: FAMILY MEDICINE

## 2024-09-04 PROCEDURE — 3008F BODY MASS INDEX DOCD: CPT | Mod: CPTII,S$GLB,, | Performed by: FAMILY MEDICINE

## 2024-09-04 NOTE — PROGRESS NOTES
"PATIENT VISIT FAMILY MEDICINE    CC:   Chief Complaint   Patient presents with    Abdominal Pain     Lower on Rt side for about a month    Follow-up    Hypertension    Neck Pain     For about a month       HPI: Julia Fernández  is a 69 y.o. female:    Patient is known to me.  Patient presents routine follow up on chronic conditions    Patient reports "a hurting in her side". 3 weeks ago she noticed a pain in her right lower quadrant of the abdomen. She reports inconsistent tenderness. She reports normal bowel function. Takes miralax, and does not strain. Reports she has had C section via a vertical mini-laparotomy (82), and an appendectomy through and additional right lateral mini laparotomy (76).    Reports pain in the neck. 3 weeks ago, benign review of systems other than discomfort with swallowing.         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    Scoliosis        PSHX:   Past Surgical History:   Procedure Laterality Date    ADENOIDECTOMY      APPENDECTOMY       SECTION      x1    CHOLECYSTECTOMY      COLONOSCOPY      COLONOSCOPY N/A 3/7/2024    Procedure: COLONOSCOPY;  Surgeon: Lakisha Angel MD;  Location: [facility];  Service: Endoscopy;  Laterality: N/A;    ESOPHAGOGASTRODUODENOSCOPY N/A 2020    Procedure: EGD (ESOPHAGOGASTRODUODENOSCOPY);  Surgeon: Lakisha Angel MD;  Location: [facility];  Service: Endoscopy;  Laterality: N/A;    EYE SURGERY Right     lower eyelid ("growth removed")    HYSTERECTOMY      endometriosis    TONSILLECTOMY      TUBAL LIGATION         FAMHX:   Family History   Problem Relation Name Age " of Onset    Arthritis Mother      Diabetes Mother      Hearing loss Mother      Vision loss Mother      COPD Mother      Depression Mother      Vision loss Father      Cancer Father          Pituitary tumor    Heart disease Father          CABG    Diabetes Sister      Hyperlipidemia Sister      Hypertension Sister      Hearing loss Sister      Drug abuse Brother      Alcohol abuse Brother      Early death Brother      Hypertension Brother      No Known Problems Son         SOCHX:   Social History     Socioeconomic History    Marital status:     Number of children: 1   Tobacco Use    Smoking status: Former     Current packs/day: 0.00     Average packs/day: 1 pack/day for 29.0 years (29.0 ttl pk-yrs)     Types: Cigarettes     Start date:      Quit date:      Years since quittin.6     Passive exposure: Past    Smokeless tobacco: Never   Substance and Sexual Activity    Alcohol use: No    Drug use: No    Sexual activity: Not Currently     Partners: Male   Social History Narrative    . Lives with partner Taylor. 1 son. 3 grandchildren (2 girls and 1 boy). Disabled. Home destroyed in [place] which was her mother's home. She now has a mobile home on the property     Social Determinants of Health     Stress: No Stress Concern Present (3/6/2020)    [employer] - Occupational Stress Questionnaire     Feeling of Stress : Not at all       ALLERGIES:   Review of patient's allergies indicates:   Allergen Reactions    Gabapentin Other (See Comments)     dizzy    Prevnar 13 [pneumoc 13-bethany conj-dip cr(pf)] Swelling and Rash       MEDS:   Current Outpatient Medications:     aspirin (ECOTRIN) 81 MG EC tablet, Take 81 mg by mouth once daily., Disp: , Rfl:     calcium carbonate-vitamin D3 600 mg-20 mcg (800 unit) Tab, Take 1 tablet by mouth 2 (two) times a day., Disp: , Rfl:     clobetasol 0.05% (TEMOVATE) 0.05 % Oint, Apply topically 2 (two) times daily. (Patient taking  "differently: Apply 1 application  topically 2 (two) times daily.), Disp: 30 g, Rfl: 0    cyclobenzaprine (FLEXERIL) 5 MG tablet, Take 1 tablet (5 mg total) by mouth 3 (three) times daily as needed for Muscle spasms., Disp: 90 tablet, Rfl: 3    gemfibroziL (LOPID) 600 MG tablet, Take 1 tablet (600 mg total) by mouth 2 (two) times daily before meals., Disp: 180 tablet, Rfl: 3    hydrocodone-acetaminophen 10-325mg (NORCO)  mg Tab, Take 1 tablet by mouth 2 (two) times daily as needed for Pain., Disp: , Rfl:     melatonin 1 mg TbDL, Take 1 tablet by mouth nightly., Disp: , Rfl:     pantoprazole (PROTONIX) 40 MG tablet, TAKE 1 TABLET EVERY DAY (Patient taking differently: Take 40 mg by mouth once daily. midmorning), Disp: 90 tablet, Rfl: 3    polyethylene glycol (GLYCOLAX) 17 gram PwPk, Take 17 g by mouth once daily., Disp: , Rfl:     rosuvastatin (CRESTOR) 10 MG tablet, Take 1 tablet (10 mg total) by mouth every evening., Disp: 90 tablet, Rfl: 3    zolpidem (AMBIEN) 5 MG Tab, Take 1 tablet (5 mg total) by mouth nightly as needed (sleep)., Disp: 30 tablet, Rfl: 5    OBJECTIVE:   Vitals:    09/04/24 0920   BP: 134/78   BP Location: Left arm   Patient Position: Sitting   BP Method: Large (Automatic)   Pulse: (!) 111   SpO2: 96%   Weight: 53.8 kg (118 lb 8 oz)   Height: 5' 1" (1.549 m)     Body mass index is 22.39 kg/m².      Physical Exam  Vitals and nursing note reviewed.   Constitutional:       Appearance: Normal appearance.   HENT:      Head: Normocephalic.   Eyes:      General:         Right eye: No discharge.         Left eye: No discharge.      Extraocular Movements: Extraocular movements intact.   Neck:      Comments: Left LAD  Cardiovascular:      Rate and Rhythm: Normal rate and regular rhythm.   Pulmonary:      Effort: Pulmonary effort is normal.      Breath sounds: Normal breath sounds.   Abdominal:      General: Abdomen is flat. Bowel sounds are normal. There is no distension.      Palpations: Abdomen is " soft. There is no mass.      Tenderness: There is abdominal tenderness. There is no guarding or rebound.      Hernia: No hernia is present.      Comments: RLQ tenderness; 2 left vertical incisions on right lower abdomen well healed.    Neurological:      Mental Status: She is alert.   Psychiatric:         Behavior: Behavior normal.           LABS:   A1C:      CBC:  Recent Labs   Lab 08/21/24  0952   WBC 7.05   RBC 4.74   Hemoglobin 14.1   Hematocrit 43.1   Platelets 257   MCV 91   MCH 29.7   MCHC 32.7     CMP:  Recent Labs   Lab 08/21/24  0952   Glucose 99   Calcium 10.3   Albumin 4.5   Total Protein 8.3   Sodium 144   Potassium 4.9   CO2 25   Chloride 107   BUN 16   Creatinine 0.8   Alkaline Phosphatase 60   ALT 34   AST 29   Total Bilirubin 0.4     LIPIDS:  Recent Labs   Lab 08/11/23  0737 08/21/24  0952   TSH 2.160  --    HDL 44 54   Cholesterol 149 185   Triglycerides 101 142   LDL Cholesterol 84.8 102.6   HDL/Cholesterol Ratio 29.5 29.2   Non-HDL Cholesterol 105 131   Total Cholesterol/HDL Ratio 3.4 3.4     TSH:  Recent Labs   Lab 08/11/23  0737   TSH 2.160         ASSESSMENT & PLAN:    Problem List Items Addressed This Visit          Neuro    Postpolio syndrome (Chronic)    Overview     - secondary to history of polio  - no longer on baclofen  - right ankle AFO orthotic            Psychiatric    Opioid dependence, uncomplicated (Chronic)    Overview     Stable. Followed by Pain management            Pulmonary    COPD (chronic obstructive pulmonary disease) (Chronic)    Overview     - 5/27/2019 PFT: moderate obstruction  - 06/29/2020 CT chest without contrast:  Mild emphysematous changes  - evaluated by Dr. Park Pulmonary  - asymptomatic            Cardiac/Vascular    Atherosclerosis of aorta (Chronic)    Overview     - goal LDL <70 on statin  - BP goal < 130/80  - ASA 81 mg daily           Mixed hyperlipidemia (Chronic)    Overview     - well controlled  - continue current medications            Hematology     Lupus anticoagulant syndrome (Chronic)    Overview     - without h/o DVT/PE  - evaluated by Rheumatology Dr. Stafford   - lupus anticoagulant testing negative and reports Rheumatology recommends to monitor yearly            Endocrine    Localized osteoporosis without current pathological fracture (Chronic)    Overview     - last Dexa 3/20/18: lumbar normal (0.6), osteopenia left femoral neck (-1.9) and osteoporosis right femoral neck (-3.9)  - 03/11/2020 bone density:  Osteoporosis left femoral neck -5.  48.1% risk of major osteoporotic fracture and 35.3% risk of hip fracture in 10 years.  Lumbar spinal  - Reclast 5 mg started by Rheumatology (2018) and transferred to PCP  - last infusion 6/20/2022 and repeat due 6/2023  - poor weight bearing on right leg 2/2 h/o polio which may affect bone density  - fall prevention  - over the counter vitamin D3 2214-7101 units daily  - dietary calcium 1200 mg per day with diet or supplements           Multiple thyroid nodules (Chronic)    Overview     - 06/29/2020 CT chest:  Showed incidental bilateral thyroid nodules            Other    Insomnia (Chronic)    Overview     - acute on chronic issue  - counseling on sleep hygiene  - recommend restart melatonin 6 mg at bedtime  - melatonin and trazodone ineffective  -can take Ambien PRN, not intended for daily/longterm use.  reviewed and appropriate          Other Visit Diagnoses       Neck swelling    -  may be due to allergies/sinusitis. LAD L>R on exam.     Relevant Orders    US Soft Tissue Head Neck    Right lateral abdominal pain    possible hernia, scar tissue    Relevant Orders    CT Abdomen Pelvis  Without Contrast    Postmenopausal    see osteoporosis plan     Relevant Orders    DXA Bone Density Axial Skeleton 1 or more sites     Follow up in about 6 months (around 3/4/2025) for med f/u.      RTC/ED precautions discussed where applicable.   Encouraged patient to let me know if there are any further questions/concerns.      Advise patient/caretaker to check with insurance regarding orders to avoid unexpected fees/costs.     The patient/caretaker indicates understanding of these issues and agrees with the plan.    John Landeros, MS4  Family Medicine    Visit today included increased complexity associated with the care of the episodic problem neck swelling, right abd pain addressed and managing the longitudinal care of the patient due to the serious and/or complex managed problem(s) as docuemtned above .    I hereby acknowledge that I am relying upon documentation authored by a medical student working under my supervision and further I hereby attest that I have verified the student documentation or findings by personally re-performing the physical exam and medical decision making activities of the Evaluation and Management service to be billed.  Kimmy Trammell

## 2024-09-17 ENCOUNTER — TELEPHONE (OUTPATIENT)
Dept: FAMILY MEDICINE | Facility: CLINIC | Age: 69
End: 2024-09-17
Payer: MEDICARE

## 2024-09-17 NOTE — TELEPHONE ENCOUNTER
Please let patient know:    Ultrasound of neck: did not show any masses or enlarged lymph nodes. No further testing for this is needed at this time. Neck symptoms may be related to sinus issues/allergies like we discussed  Bone scan: shows stable finding of left hip but I have contacted the radiologist to give me a read on the right hip since this showed worse osteoporosis on the last scan.   CT scan did not show any concerning findings or anything that would explain her pain. Is she still having the lower abdominal pain?

## 2024-09-17 NOTE — TELEPHONE ENCOUNTER
----- Message from Shana Rivera sent at 9/17/2024  8:03 AM CDT -----  Contact: pt  Type:  Test Results    Who Called: pt  Name of Test (Lab/Mammo/Etc): US, CT, Bone Density   Date of Test: 09/13 and 09/16  Ordering Provider: Valeri   Where the test was performed: [facility]  Would the patient rather a call back or a response via [facility]? call  Best Call Back Number: [phone]  Additional Information:

## 2024-09-19 ENCOUNTER — TELEPHONE (OUTPATIENT)
Dept: FAMILY MEDICINE | Facility: CLINIC | Age: 69
End: 2024-09-19
Payer: MEDICARE

## 2024-09-26 ENCOUNTER — TELEPHONE (OUTPATIENT)
Dept: FAMILY MEDICINE | Facility: CLINIC | Age: 69
End: 2024-09-26
Payer: MEDICARE

## 2024-09-26 NOTE — TELEPHONE ENCOUNTER
----- Message from Kimmy Trammell MD sent at 9/26/2024 12:30 PM CDT -----  Let patient know ultrasound did not show a hernia. We can refer her to gastroenterology since her pain is continuing or she can monitor it and let me know if it gets worse. We can also consider repeating the CT scan with contrast. Let me know how she would like to proceed.     Sincerely,     Dr Trammell

## 2024-10-27 DIAGNOSIS — K21.9 GASTROESOPHAGEAL REFLUX DISEASE WITHOUT ESOPHAGITIS: ICD-10-CM

## 2024-10-28 RX ORDER — PANTOPRAZOLE SODIUM 40 MG/1
TABLET, DELAYED RELEASE ORAL
Qty: 90 TABLET | Refills: 3 | Status: SHIPPED | OUTPATIENT
Start: 2024-10-28

## 2025-01-02 DIAGNOSIS — G47.00 INSOMNIA, UNSPECIFIED TYPE: Chronic | ICD-10-CM

## 2025-01-02 RX ORDER — ZOLPIDEM TARTRATE 5 MG/1
5 TABLET ORAL NIGHTLY PRN
Qty: 30 TABLET | Refills: 5 | Status: SHIPPED | OUTPATIENT
Start: 2025-01-02 | End: 2025-07-03

## 2025-01-02 NOTE — TELEPHONE ENCOUNTER
----- Message from Callie sent at 1/2/2025 12:05 PM CST -----  Type:  RX Refill Request    Who Called: pt  Refill or New Rx:refill  RX Name and Strength:  zolpidem (AMBIEN) 5 MG Tab 30 tablet 5 4/19/2024 10/18/2024 No  Sig - Route: Take 1 tablet (5 mg total) by mouth nightly as needed (sleep). - Oral  ipt confirmed by pharmacy (4/19/2024  9:04 AM CDT)    Pharmacy  [facility] - [place], [place] - [address]    Best Call Back Number:[phone]

## 2025-01-02 NOTE — TELEPHONE ENCOUNTER
No care due was identified.  [facility] Embedded Care Due Messages. Reference number: [id number].   1/02/2025 1:26:25 PM CST

## 2025-01-02 NOTE — TELEPHONE ENCOUNTER
Spoke to pt and informed her that the prescription was  and that I was going to let Dr Trammell know so we can get it refilled. Pt stated verbal understanding.

## 2025-01-02 NOTE — TELEPHONE ENCOUNTER
Spoke to pharmacy staff and they informed me that the prescription for Ambien was  and the pt needed a new one- routed this message to Dr Trammell.

## 2025-01-13 DIAGNOSIS — Z00.00 ENCOUNTER FOR MEDICARE ANNUAL WELLNESS EXAM: ICD-10-CM

## 2025-03-11 ENCOUNTER — OFFICE VISIT (OUTPATIENT)
Dept: FAMILY MEDICINE | Facility: CLINIC | Age: 70
End: 2025-03-11
Payer: MEDICARE

## 2025-03-11 VITALS
SYSTOLIC BLOOD PRESSURE: 132 MMHG | DIASTOLIC BLOOD PRESSURE: 79 MMHG | HEART RATE: 92 BPM | HEIGHT: 61 IN | WEIGHT: 119.19 LBS | OXYGEN SATURATION: 95 % | BODY MASS INDEX: 22.5 KG/M2

## 2025-03-11 DIAGNOSIS — F11.20 OPIOID DEPENDENCE, UNCOMPLICATED: ICD-10-CM

## 2025-03-11 DIAGNOSIS — M89.69 POLIOMYELITIS OSTEOPATHY OF MULTIPLE SITES: ICD-10-CM

## 2025-03-11 DIAGNOSIS — E04.2 MULTIPLE THYROID NODULES: Chronic | ICD-10-CM

## 2025-03-11 DIAGNOSIS — M81.6 LOCALIZED OSTEOPOROSIS WITHOUT CURRENT PATHOLOGICAL FRACTURE: Chronic | ICD-10-CM

## 2025-03-11 DIAGNOSIS — J44.9 CHRONIC OBSTRUCTIVE PULMONARY DISEASE, UNSPECIFIED COPD TYPE: ICD-10-CM

## 2025-03-11 DIAGNOSIS — G14 POSTPOLIO SYNDROME: ICD-10-CM

## 2025-03-11 DIAGNOSIS — D68.62 LUPUS ANTICOAGULANT SYNDROME: Chronic | ICD-10-CM

## 2025-03-11 DIAGNOSIS — G47.00 INSOMNIA, UNSPECIFIED TYPE: Chronic | ICD-10-CM

## 2025-03-11 DIAGNOSIS — R10.9 RIGHT LATERAL ABDOMINAL PAIN: ICD-10-CM

## 2025-03-11 DIAGNOSIS — I70.0 ATHEROSCLEROSIS OF AORTA: Chronic | ICD-10-CM

## 2025-03-11 DIAGNOSIS — B91 POLIOMYELITIS OSTEOPATHY OF MULTIPLE SITES: ICD-10-CM

## 2025-03-11 DIAGNOSIS — E78.2 MIXED HYPERLIPIDEMIA: Primary | Chronic | ICD-10-CM

## 2025-03-11 PROCEDURE — 99999 PR PBB SHADOW E&M-EST. PATIENT-LVL IV: CPT | Mod: PBBFAC,HCNC,, | Performed by: FAMILY MEDICINE

## 2025-03-11 RX ORDER — ZOLPIDEM TARTRATE 5 MG/1
5 TABLET ORAL NIGHTLY PRN
Qty: 30 TABLET | Refills: 5 | Status: SHIPPED | OUTPATIENT
Start: 2025-03-11 | End: 2025-09-09

## 2025-03-11 NOTE — PROGRESS NOTES
"PATIENT VISIT FAMILY MEDICINE    CC:   Chief Complaint   Patient presents with    Abdominal Pain    Hyperlipidemia       HPI:    History of Present Illness    CHIEF COMPLAINT:  Julia presents today for follow-up    ABDOMINAL PAIN:  She reports improvement in right-sided abdominal pain, though it is still exacerbated with movement and bending over.    GI CONCERNS:  She experiences constipation related to pain therapy. She maintains regular bowel movements with daily Dulcolax and Miralax.    GENITOURINARY:  She reports a 5-6 year history of nocturia, requiring 2-6 bathroom visits per night. She notes better urinary control during daytime hours.    OSTEOPOROSIS:  She has osteoporosis in the right hip confirmed by bone density scan. She has been on Reclast since 2018, with next dose due June 20th.    CURRENT MEDICATIONS:  She takes a fibrate and statin for cholesterol management, and Ambien for sleep.          MEDS: Current Medications[1]    OBJECTIVE:   Vitals:    03/11/25 1024   BP: 132/79   BP Location: Left arm   Patient Position: Sitting   Pulse: 92   SpO2: 95%   Weight: 54.1 kg (119 lb 2.5 oz)   Height: 5' 1" (1.549 m)     Body mass index is 22.51 kg/m².      Physical Exam    Constitutional: Normal appearance.  HENT: Normocephalic.  Eyes: No discharge bilaterally. Extraocular movements intact.  Cardiovascular: Normal rate and regular rhythm. Normal heart sounds.  Pulmonary: Pulmonary effort is normal. Normal breath sounds.  Abdominal: Abdomen is flat. Bowel sounds are normal. No distension. Abdomen is soft. No mass. No tenderness. No guarding. No rebound. No hernia is present.  Skin: Warm. Dry.  Neurological: Alert.  Psychiatric: Behavior normal.           LABS:   A1C:      CBC:  Recent Labs   Lab 08/21/24  0952   WBC 7.05   RBC 4.74   Hemoglobin 14.1   Hematocrit 43.1   Platelets 257   MCV 91   MCH 29.7   MCHC 32.7     CMP:  Recent Labs   Lab 08/21/24  0952   Glucose 99   Calcium 10.3   Albumin 4.5   Total Protein " 8.3   Sodium 144   Potassium 4.9   CO2 25   Chloride 107   BUN 16   Creatinine 0.8   Alkaline Phosphatase 60   ALT 34   AST 29   Total Bilirubin 0.4     LIPIDS:  Recent Labs   Lab 08/11/23  0737 08/21/24  0952   TSH 2.160  --    HDL 44 54   Cholesterol 149 185   Triglycerides 101 142   LDL Cholesterol 84.8 102.6   HDL/Cholesterol Ratio 29.5 29.2   Non-HDL Cholesterol 105 131   Total Cholesterol/HDL Ratio 3.4 3.4     TSH:  Recent Labs   Lab 08/11/23  0737   TSH 2.160         ASSESSMENT & PLAN:    Problem List Items Addressed This Visit          Neuro    Postpolio syndrome (Chronic)    Overview   - secondary to history of polio  - no longer on baclofen  - right ankle AFO orthotic         Relevant Orders    CBC Auto Differential       Psychiatric    Opioid dependence, uncomplicated (Chronic)    Overview   Stable. Followed by Pain management         Relevant Orders    CBC Auto Differential       Pulmonary    COPD (chronic obstructive pulmonary disease) (Chronic)    Overview   - 5/27/2019 PFT: moderate obstruction  - 06/29/2020 CT chest without contrast:  Mild emphysematous changes  - evaluated by Dr. Park Pulmonary  - asymptomatic         Relevant Orders    CBC Auto Differential       Cardiac/Vascular    Atherosclerosis of aorta (Chronic)    Overview   - goal LDL <70 on statin  - BP goal < 130/80  - ASA 81 mg daily           Relevant Orders    CBC Auto Differential    Mixed hyperlipidemia - Primary (Chronic)    Overview   - well controlled  - continue current medications         Relevant Orders    CBC Auto Differential    Comprehensive Metabolic Panel    Lipid Panel       Hematology    Lupus anticoagulant syndrome (Chronic)    Overview   - without h/o DVT/PE  - evaluated by Rheumatology Dr. Stafford   - lupus anticoagulant testing negative and reports Rheumatology recommends to monitor yearly         Relevant Orders    CBC Auto Differential    LUPUS ANTICOAGULANT (DRVVT)       Endocrine    Localized osteoporosis without  current pathological fracture (Chronic)    Overview   - last Dexa 3/20/18: lumbar normal (0.6), osteopenia left femoral neck (-1.9) and osteoporosis right femoral neck (-3.9)  - 03/11/2020 bone density:  Osteoporosis left femoral neck -5.  48.1% risk of major osteoporotic fracture and 35.3% risk of hip fracture in 10 years.  Lumbar spinal  - Reclast 5 mg started by Rheumatology (2018) and transferred to PCP  - last infusion 6/20/2022 and repeat due 6/2023  - poor weight bearing on right leg 2/2 h/o polio which may affect bone density  - fall prevention  - over the counter vitamin D3 2382-6268 units daily  - dietary calcium 1200 mg per day with diet or supplements           Relevant Orders    CBC Auto Differential    Multiple thyroid nodules (Chronic)    Overview   - 06/29/2020 CT chest:  Showed incidental bilateral thyroid nodules         Relevant Orders    CBC Auto Differential       Orthopedic    Poliomyelitis osteopathy of multiple sites    Overview   - secondary to history of polio  - no longer on baclofen  - right ankle AFO orthotic         Relevant Orders    CBC Auto Differential       Other    Insomnia (Chronic)    Overview   - acute on chronic issue  - counseling on sleep hygiene  - recommend restart melatonin 6 mg at bedtime  - melatonin and trazodone ineffective  -can take Ambien PRN, not intended for daily/longterm use.  reviewed and appropriate         Relevant Medications    zolpidem (AMBIEN) 5 MG Tab    Other Relevant Orders    CBC Auto Differential     Other Visit Diagnoses         Right lateral abdominal pain                OSTEOPOROSIS:  - contacted radiologist for read on right hip  - Noted that the patient is due for Reclast treatment on June 20th.    Right ABDOMINAL PAIN:  - Noted that the patient reports occasional pain in right abdomen, which has been ongoing but is somewhat better.  - Observed that the pain is more noticeable with movement.  - Advised the patient to continue monitoring  right-sided abdominal pain and report if worsening.      - Follow up in 6 months med f/u          RTC/ED precautions discussed where applicable.   Encouraged patient to let me know if there are any further questions/concerns.     Advise patient/caretaker to check with insurance regarding orders to avoid unexpected fees/costs.     The patient/caretaker indicates understanding of these issues and agrees with the plan.    This note was generated with the assistance of ambient listening technology. I attest to having reviewed and edited the generated note for accuracy, though some syntax or spelling errors may persist. Please contact the author of this note for any clarification.      Dr. Kimmy Trammell MD  Family Medicine         [1]   Current Outpatient Medications:     aspirin (ECOTRIN) 81 MG EC tablet, Take 81 mg by mouth once daily., Disp: , Rfl:     calcium carbonate-vitamin D3 600 mg-20 mcg (800 unit) Tab, Take 1 tablet by mouth 2 (two) times a day., Disp: , Rfl:     gemfibroziL (LOPID) 600 MG tablet, Take 1 tablet (600 mg total) by mouth 2 (two) times daily before meals., Disp: 180 tablet, Rfl: 3    hydrocodone-acetaminophen 10-325mg (NORCO)  mg Tab, Take 1 tablet by mouth 2 (two) times daily as needed for Pain., Disp: , Rfl:     melatonin 1 mg TbDL, Take 1 tablet by mouth nightly., Disp: , Rfl:     pantoprazole (PROTONIX) 40 MG tablet, TAKE 1 TABLET EVERY DAY, Disp: 90 tablet, Rfl: 3    polyethylene glycol (GLYCOLAX) 17 gram PwPk, Take 17 g by mouth once daily., Disp: , Rfl:     rosuvastatin (CRESTOR) 10 MG tablet, Take 1 tablet (10 mg total) by mouth every evening., Disp: 90 tablet, Rfl: 3    clobetasol 0.05% (TEMOVATE) 0.05 % Oint, Apply topically 2 (two) times daily. (Patient not taking: Reported on 3/11/2025), Disp: 30 g, Rfl: 0    cyclobenzaprine (FLEXERIL) 5 MG tablet, Take 1 tablet (5 mg total) by mouth 3 (three) times daily as needed for Muscle spasms. (Patient not taking: Reported on 3/11/2025),  Disp: 90 tablet, Rfl: 3    zolpidem (AMBIEN) 5 MG Tab, Take 1 tablet (5 mg total) by mouth nightly as needed (sleep)., Disp: 30 tablet, Rfl: 5

## 2025-04-29 ENCOUNTER — OFFICE VISIT (OUTPATIENT)
Dept: URGENT CARE | Facility: CLINIC | Age: 70
End: 2025-04-29
Payer: MEDICARE

## 2025-04-29 VITALS
BODY MASS INDEX: 22.48 KG/M2 | DIASTOLIC BLOOD PRESSURE: 74 MMHG | HEIGHT: 61 IN | RESPIRATION RATE: 18 BRPM | SYSTOLIC BLOOD PRESSURE: 137 MMHG | TEMPERATURE: 99 F | WEIGHT: 119.06 LBS | HEART RATE: 94 BPM | OXYGEN SATURATION: 96 %

## 2025-04-29 DIAGNOSIS — J06.9 VIRAL URI: ICD-10-CM

## 2025-04-29 DIAGNOSIS — J32.9 SINUSITIS, UNSPECIFIED CHRONICITY, UNSPECIFIED LOCATION: Primary | ICD-10-CM

## 2025-04-29 DIAGNOSIS — J02.9 SORE THROAT: ICD-10-CM

## 2025-04-29 DIAGNOSIS — R09.81 NASAL CONGESTION: ICD-10-CM

## 2025-04-29 LAB
CTP QC/QA: YES
CTP QC/QA: YES
MOLECULAR STREP A: NEGATIVE
SARS CORONAVIRUS 2 ANTIGEN: NEGATIVE

## 2025-04-29 PROCEDURE — 87651 STREP A DNA AMP PROBE: CPT | Mod: QW,S$GLB,, | Performed by: PHYSICIAN ASSISTANT

## 2025-04-29 PROCEDURE — 99214 OFFICE O/P EST MOD 30 MIN: CPT | Mod: S$GLB,,, | Performed by: PHYSICIAN ASSISTANT

## 2025-04-29 PROCEDURE — 87811 SARS-COV-2 COVID19 W/OPTIC: CPT | Mod: QW,S$GLB,, | Performed by: PHYSICIAN ASSISTANT

## 2025-04-29 RX ORDER — BROMPHENIRAMINE MALEATE, PSEUDOEPHEDRINE HYDROCHLORIDE, AND DEXTROMETHORPHAN HYDROBROMIDE 2; 30; 10 MG/5ML; MG/5ML; MG/5ML
5 SYRUP ORAL EVERY 12 HOURS PRN
Qty: 118 ML | Refills: 0 | Status: SHIPPED | OUTPATIENT
Start: 2025-04-29

## 2025-04-29 RX ORDER — FLUTICASONE PROPIONATE 50 MCG
1 SPRAY, SUSPENSION (ML) NASAL DAILY
Qty: 16 G | Refills: 3 | Status: SHIPPED | OUTPATIENT
Start: 2025-04-29

## 2025-04-29 RX ORDER — CROMOLYN SODIUM 5.2 MG/ML
1 SPRAY, METERED NASAL 4 TIMES DAILY
Qty: 26 ML | Refills: 1 | Status: SHIPPED | OUTPATIENT
Start: 2025-04-29

## 2025-04-29 RX ORDER — MONTELUKAST SODIUM 10 MG/1
10 TABLET ORAL NIGHTLY
Qty: 30 TABLET | Refills: 0 | Status: SHIPPED | OUTPATIENT
Start: 2025-04-29 | End: 2025-05-29

## 2025-04-29 RX ORDER — PREDNISONE 20 MG/1
20 TABLET ORAL DAILY
Qty: 5 TABLET | Refills: 0 | Status: SHIPPED | OUTPATIENT
Start: 2025-04-29

## 2025-04-29 NOTE — PROGRESS NOTES
"Subjective:      Patient ID: Julia Fernández is a 70 y.o. female.    Vitals:  height is 5' 1" (1.549 m) and weight is 54 kg (119 lb 0.8 oz). Her oral temperature is 98.6 °F (37 °C). Her blood pressure is 137/74 and her pulse is 94. Her respiration is 18 and oxygen saturation is 96%.     Chief Complaint: Sore Throat    Pt c/o cough and sore throat that started 5 days ago. Tried Claritin and mucinex with no relief.  She also complains of some nasal congestion.  She states all the nasal discharge and sputum is clear    Cough  This is a new problem. Episode onset: 5 days. The problem has been gradually worsening. The problem occurs every few minutes. The cough is Productive of sputum. Associated symptoms include headaches, nasal congestion and postnasal drip. Pertinent negatives include no chills, fever or shortness of breath. Nothing aggravates the symptoms. Treatments tried: claritin, mucinex. The treatment provided no relief.       Constitution: Negative for chills and fever.   HENT:  Positive for congestion, postnasal drip and sinus pressure.    Respiratory:  Positive for cough and sputum production. Negative for shortness of breath.    Skin:  Negative for erythema.   Neurological:  Positive for headaches.      Objective:     Physical Exam   Constitutional: She is oriented to person, place, and time. She appears well-developed. She is cooperative.  Non-toxic appearance. She does not appear ill. No distress.   HENT:   Head: Normocephalic and atraumatic.   Ears:   Right Ear: Hearing and external ear normal.   Left Ear: Hearing and external ear normal.   Nose: Nose normal. No mucosal edema, rhinorrhea, nasal deformity or congestion. No epistaxis. Right sinus exhibits no maxillary sinus tenderness and no frontal sinus tenderness. Left sinus exhibits no maxillary sinus tenderness and no frontal sinus tenderness.   Mouth/Throat: Uvula is midline and mucous membranes are normal. No trismus in the jaw. Normal dentition. No " uvula swelling. Posterior oropharyngeal erythema present. No posterior oropharyngeal edema.   Eyes: Conjunctivae, EOM and lids are normal. Pupils are equal, round, and reactive to light. Right eye exhibits no discharge. Left eye exhibits no discharge. No scleral icterus.   Neck: Trachea normal and phonation normal. Neck supple. No JVD present. No tracheal deviation present. No thyromegaly present. No edema present. No erythema present. No neck rigidity present.   Cardiovascular: Normal rate, regular rhythm and normal pulses.   Pulmonary/Chest: Effort normal. No stridor. No respiratory distress. She has no decreased breath sounds. She has no wheezes. She has no rhonchi. She has no rales.   Abdominal: Normal appearance. She exhibits no distension. Soft. There is no rebound.   Musculoskeletal: Normal range of motion.         General: No deformity. Normal range of motion.   Neurological: She is alert and oriented to person, place, and time. She exhibits normal muscle tone. Coordination normal.   Skin: Skin is warm, dry, intact, not diaphoretic, not pale and no rash. Capillary refill takes less than 2 seconds. No erythema   Psychiatric: Her speech is normal and behavior is normal. Judgment and thought content normal.   Nursing note and vitals reviewed.    Results for orders placed or performed in visit on 04/29/25   POCT Strep A, Molecular    Collection Time: 04/29/25 11:19 AM   Result Value Ref Range    Molecular Strep A, POC Negative Negative     Acceptable Yes    SARS Coronavirus 2 Antigen, POCT Manual Read    Collection Time: 04/29/25 11:24 AM   Result Value Ref Range    SARS Coronavirus 2 Antigen Negative Negative, Presumptive Negative     Acceptable Yes     No results found.     Assessment:     1. Sinusitis, unspecified chronicity, unspecified location    2. Sore throat    3. Nasal congestion    4. Viral URI        Plan:       Sinusitis, unspecified chronicity, unspecified location  -      fluticasone propionate (FLONASE) 50 mcg/actuation nasal spray; 1 spray (50 mcg total) by Each Nostril route once daily.  Dispense: 16 g; Refill: 3  -     cromolyn (NASALCHROM) 5.2 mg/spray (4 %) nasal spray; 1 spray by Nasal route 4 (four) times daily.  Dispense: 26 mL; Refill: 1  -     predniSONE (DELTASONE) 20 MG tablet; Take 1 tablet (20 mg total) by mouth once daily.  Dispense: 5 tablet; Refill: 0  -     brompheniramine-pseudoeph-DM (BROMFED DM) 2-30-10 mg/5 mL Syrp; Take 5 mLs by mouth every 12 (twelve) hours as needed (Congestive).  Dispense: 118 mL; Refill: 0  -     montelukast (SINGULAIR) 10 mg tablet; Take 1 tablet (10 mg total) by mouth every evening.  Dispense: 30 tablet; Refill: 0    Sore throat  -     POCT Strep A, Molecular  -     SARS Coronavirus 2 Antigen, POCT Manual Read  -     fluticasone propionate (FLONASE) 50 mcg/actuation nasal spray; 1 spray (50 mcg total) by Each Nostril route once daily.  Dispense: 16 g; Refill: 3  -     cromolyn (NASALCHROM) 5.2 mg/spray (4 %) nasal spray; 1 spray by Nasal route 4 (four) times daily.  Dispense: 26 mL; Refill: 1  -     predniSONE (DELTASONE) 20 MG tablet; Take 1 tablet (20 mg total) by mouth once daily.  Dispense: 5 tablet; Refill: 0  -     montelukast (SINGULAIR) 10 mg tablet; Take 1 tablet (10 mg total) by mouth every evening.  Dispense: 30 tablet; Refill: 0    Nasal congestion  -     SARS Coronavirus 2 Antigen, POCT Manual Read  -     fluticasone propionate (FLONASE) 50 mcg/actuation nasal spray; 1 spray (50 mcg total) by Each Nostril route once daily.  Dispense: 16 g; Refill: 3  -     cromolyn (NASALCHROM) 5.2 mg/spray (4 %) nasal spray; 1 spray by Nasal route 4 (four) times daily.  Dispense: 26 mL; Refill: 1  -     predniSONE (DELTASONE) 20 MG tablet; Take 1 tablet (20 mg total) by mouth once daily.  Dispense: 5 tablet; Refill: 0  -     brompheniramine-pseudoeph-DM (BROMFED DM) 2-30-10 mg/5 mL Syrp; Take 5 mLs by mouth every 12 (twelve) hours as  needed (Congestive).  Dispense: 118 mL; Refill: 0  -     montelukast (SINGULAIR) 10 mg tablet; Take 1 tablet (10 mg total) by mouth every evening.  Dispense: 30 tablet; Refill: 0    Viral URI      Follow up if symptoms worsen or fail to improve, for F/U with PCP or ED. There are no Patient Instructions on file for this visit.

## 2025-06-09 DIAGNOSIS — E78.2 MIXED HYPERLIPIDEMIA: ICD-10-CM

## 2025-06-09 DIAGNOSIS — Z12.31 SCREENING MAMMOGRAM FOR BREAST CANCER: Primary | ICD-10-CM

## 2025-06-09 DIAGNOSIS — K21.9 GASTROESOPHAGEAL REFLUX DISEASE WITHOUT ESOPHAGITIS: ICD-10-CM

## 2025-06-09 NOTE — TELEPHONE ENCOUNTER
Care Due:                  Date            Visit Type   Department     Provider  --------------------------------------------------------------------------------                                EP -                              PRIMARY      [facility]  Last Visit: 03-      CARE ([facility])   [facility]  [place]                               -                              PRIMARY      [facility]  Next Visit: 10-      CARE ([facility])   [facility]                                                            Last  Test          Frequency    Reason                     Performed    Due Date  --------------------------------------------------------------------------------    CBC.........  12 months..  gemfibroziL..............  08- 08-    CMP.........  12 months..  gemfibroziL, rosuvastatin  08- 08-    Lipid Panel.  12 months..  gemfibroziL, rosuvastatin  08- 08-    [facility] Embedded Care Due Messages. Reference number: [id number].   6/09/2025 1:13:14 PM CDT

## 2025-06-09 NOTE — TELEPHONE ENCOUNTER
----- Message from [employer] sent at 2025 12:05 PM CDT -----  Type:  RX Refill RequestWho Called: ptRefill or New Rx:refillRX Name and Strength:rosuvastatin (CRESTOR) 10 MG tabletgemfibroziL (LOPID) 600 MG tabletpantoprazole (PROTONIX) 40 MG tabletHow is the patient currently taking it? (ex. 1XDay):1xEveninxDay:1xDayIs this a 30 day or 90 day RX:90Preferred Pharmacy with phone number:[facility] - [facility] [address] Phone: [phone]: [phone] or Mail Order:mailOrdering Provider:Suhail the patient rather a call back or a response via [facility]? Call backBest Call Back Number:[phone] Additional Information:

## 2025-06-10 NOTE — TELEPHONE ENCOUNTER
----- Message from [facility] sent at 6/10/2025  8:26 AM CDT -----  Type:  MammogramCaller is requesting to schedule their annual mammogram appointment.  Order is not listed in EPIC.  Please enter order and contact patient to schedule.Name of Caller:PtWhere would they [facility] the [facility] or a [facility]? Call [facility] Call Back Number:[phone] Additional Information: Pt received letter indicating it is time for her annual mammo

## 2025-06-11 RX ORDER — ROSUVASTATIN CALCIUM 10 MG/1
10 TABLET, COATED ORAL NIGHTLY
Qty: 90 TABLET | Refills: 3 | Status: SHIPPED | OUTPATIENT
Start: 2025-06-11

## 2025-06-11 RX ORDER — GEMFIBROZIL 600 MG/1
600 TABLET, FILM COATED ORAL
Qty: 180 TABLET | Refills: 3 | Status: SHIPPED | OUTPATIENT
Start: 2025-06-11

## 2025-06-11 RX ORDER — PANTOPRAZOLE SODIUM 40 MG/1
40 TABLET, DELAYED RELEASE ORAL DAILY
Qty: 90 TABLET | Refills: 3 | Status: SHIPPED | OUTPATIENT
Start: 2025-06-11

## 2025-08-05 ENCOUNTER — TELEPHONE (OUTPATIENT)
Dept: FAMILY MEDICINE | Facility: CLINIC | Age: 70
End: 2025-08-05
Payer: MEDICARE

## 2025-08-05 NOTE — TELEPHONE ENCOUNTER
Copied from CRM #[id number]. Topic: Medications - Medication Question  >> Aug 5, 2025 12:53 PM Stephanie wrote:  Type:  requesting a call     Who Called: pt   Would the patient rather a call back or a response via [facility]? Call   Best Call Back Number: [phone] (M)  Additional Information: regarding rosuvastatin (CRESTOR) 10 MG tablet

## 2025-08-08 NOTE — TELEPHONE ENCOUNTER
Called and made an appointment with LOVE Minor to discuss medicine change. Pt understood verbally.

## 2025-08-11 ENCOUNTER — OFFICE VISIT (OUTPATIENT)
Dept: FAMILY MEDICINE | Facility: CLINIC | Age: 70
End: 2025-08-11
Payer: MEDICARE

## 2025-08-11 VITALS
DIASTOLIC BLOOD PRESSURE: 70 MMHG | BODY MASS INDEX: 23.02 KG/M2 | HEIGHT: 61 IN | HEART RATE: 90 BPM | SYSTOLIC BLOOD PRESSURE: 120 MMHG | OXYGEN SATURATION: 98 % | WEIGHT: 121.94 LBS

## 2025-08-11 DIAGNOSIS — E78.2 MIXED HYPERLIPIDEMIA: ICD-10-CM

## 2025-08-11 DIAGNOSIS — M79.604 BILATERAL LEG PAIN: Primary | ICD-10-CM

## 2025-08-11 DIAGNOSIS — M79.605 BILATERAL LEG PAIN: Primary | ICD-10-CM

## 2025-08-11 DIAGNOSIS — F11.20 OPIOID DEPENDENCE, UNCOMPLICATED: Chronic | ICD-10-CM

## 2025-08-11 DIAGNOSIS — Z78.9 STATIN INTOLERANCE: ICD-10-CM

## 2025-08-11 PROCEDURE — 3078F DIAST BP <80 MM HG: CPT | Mod: CPTII,HCNC,S$GLB,

## 2025-08-11 PROCEDURE — 3288F FALL RISK ASSESSMENT DOCD: CPT | Mod: CPTII,HCNC,S$GLB,

## 2025-08-11 PROCEDURE — 3008F BODY MASS INDEX DOCD: CPT | Mod: CPTII,HCNC,S$GLB,

## 2025-08-11 PROCEDURE — 3074F SYST BP LT 130 MM HG: CPT | Mod: CPTII,HCNC,S$GLB,

## 2025-08-11 PROCEDURE — 99214 OFFICE O/P EST MOD 30 MIN: CPT | Mod: HCNC,S$GLB,,

## 2025-08-11 PROCEDURE — 99999 PR PBB SHADOW E&M-EST. PATIENT-LVL IV: CPT | Mod: PBBFAC,HCNC,,

## 2025-08-11 PROCEDURE — 1160F RVW MEDS BY RX/DR IN RCRD: CPT | Mod: CPTII,HCNC,S$GLB,

## 2025-08-11 PROCEDURE — 1159F MED LIST DOCD IN RCRD: CPT | Mod: CPTII,HCNC,S$GLB,

## 2025-08-11 PROCEDURE — 1101F PT FALLS ASSESS-DOCD LE1/YR: CPT | Mod: CPTII,HCNC,S$GLB,

## 2025-08-11 PROCEDURE — 1126F AMNT PAIN NOTED NONE PRSNT: CPT | Mod: CPTII,HCNC,S$GLB,
